# Patient Record
Sex: FEMALE | Race: WHITE | NOT HISPANIC OR LATINO | Employment: OTHER | URBAN - METROPOLITAN AREA
[De-identification: names, ages, dates, MRNs, and addresses within clinical notes are randomized per-mention and may not be internally consistent; named-entity substitution may affect disease eponyms.]

---

## 2017-01-24 ENCOUNTER — HOSPITAL ENCOUNTER (OUTPATIENT)
Dept: PALLIATIVE MEDICINE | Facility: OTHER | Age: 72
Discharge: HOME | End: 2017-01-24
Attending: PHYSICIAN ASSISTANT

## 2017-01-24 DIAGNOSIS — F11.20 OPIOID DEPENDENCE, CONTINUOUS (H): ICD-10-CM

## 2017-01-24 DIAGNOSIS — M51.369 LUMBAR DEGENERATIVE DISC DISEASE: ICD-10-CM

## 2017-01-24 DIAGNOSIS — D49.7 PITUITARY TUMOR: ICD-10-CM

## 2017-01-24 DIAGNOSIS — M54.16 LUMBAR RADICULAR PAIN: ICD-10-CM

## 2017-01-24 DIAGNOSIS — M25.50 PAIN IN JOINT, MULTIPLE SITES: ICD-10-CM

## 2017-01-24 DIAGNOSIS — G89.4 CHRONIC PAIN SYNDROME: ICD-10-CM

## 2017-01-24 DIAGNOSIS — E11.42 DIABETIC PERIPHERAL NEUROPATHY (H): ICD-10-CM

## 2017-01-24 ASSESSMENT — MIFFLIN-ST. JEOR: SCORE: 1499.93

## 2017-01-25 ENCOUNTER — AMBULATORY - HEALTHEAST (OUTPATIENT)
Dept: PALLIATIVE MEDICINE | Facility: OTHER | Age: 72
End: 2017-01-25

## 2017-01-30 ENCOUNTER — AMBULATORY - HEALTHEAST (OUTPATIENT)
Dept: NEUROSURGERY | Facility: CLINIC | Age: 72
End: 2017-01-30

## 2017-02-09 ENCOUNTER — COMMUNICATION - HEALTHEAST (OUTPATIENT)
Dept: NEUROSURGERY | Facility: CLINIC | Age: 72
End: 2017-02-09

## 2017-02-09 ENCOUNTER — AMBULATORY - HEALTHEAST (OUTPATIENT)
Dept: PALLIATIVE MEDICINE | Facility: OTHER | Age: 72
End: 2017-02-09

## 2017-02-14 ENCOUNTER — AMBULATORY - HEALTHEAST (OUTPATIENT)
Dept: NEUROSURGERY | Facility: CLINIC | Age: 72
End: 2017-02-14

## 2017-02-20 ENCOUNTER — AMBULATORY - HEALTHEAST (OUTPATIENT)
Dept: NEUROSURGERY | Facility: CLINIC | Age: 72
End: 2017-02-20

## 2017-02-20 ENCOUNTER — OFFICE VISIT - HEALTHEAST (OUTPATIENT)
Dept: NEUROSURGERY | Facility: CLINIC | Age: 72
End: 2017-02-20

## 2017-02-20 ENCOUNTER — OFFICE VISIT - HEALTHEAST (OUTPATIENT)
Dept: RADIOLOGY | Facility: CLINIC | Age: 72
End: 2017-02-20

## 2017-02-20 DIAGNOSIS — D49.7 PITUITARY TUMOR: ICD-10-CM

## 2017-02-20 ASSESSMENT — MIFFLIN-ST. JEOR: SCORE: 1499.93

## 2017-02-22 ENCOUNTER — COMMUNICATION - HEALTHEAST (OUTPATIENT)
Dept: PALLIATIVE MEDICINE | Facility: OTHER | Age: 72
End: 2017-02-22

## 2017-02-22 DIAGNOSIS — G89.4 CHRONIC PAIN SYNDROME: ICD-10-CM

## 2017-02-24 ENCOUNTER — COMMUNICATION - HEALTHEAST (OUTPATIENT)
Dept: NEUROSURGERY | Facility: CLINIC | Age: 72
End: 2017-02-24

## 2017-02-27 ENCOUNTER — AMBULATORY - HEALTHEAST (OUTPATIENT)
Dept: NEUROSURGERY | Facility: CLINIC | Age: 72
End: 2017-02-27

## 2017-02-27 ENCOUNTER — COMMUNICATION - HEALTHEAST (OUTPATIENT)
Dept: NEUROSURGERY | Facility: CLINIC | Age: 72
End: 2017-02-27

## 2017-03-02 ENCOUNTER — AMBULATORY - HEALTHEAST (OUTPATIENT)
Dept: NEUROSURGERY | Facility: CLINIC | Age: 72
End: 2017-03-02

## 2017-03-08 ENCOUNTER — AMBULATORY - HEALTHEAST (OUTPATIENT)
Dept: NEUROSURGERY | Facility: CLINIC | Age: 72
End: 2017-03-08

## 2017-03-08 DIAGNOSIS — D35.2 PITUITARY ADENOMA (H): ICD-10-CM

## 2017-03-10 ENCOUNTER — COMMUNICATION - HEALTHEAST (OUTPATIENT)
Dept: NEUROSURGERY | Facility: CLINIC | Age: 72
End: 2017-03-10

## 2017-03-13 ENCOUNTER — COMMUNICATION - HEALTHEAST (OUTPATIENT)
Dept: NEUROSURGERY | Facility: CLINIC | Age: 72
End: 2017-03-13

## 2017-03-16 ENCOUNTER — AMBULATORY - HEALTHEAST (OUTPATIENT)
Dept: NEUROSURGERY | Facility: CLINIC | Age: 72
End: 2017-03-16

## 2017-03-23 ENCOUNTER — HOSPITAL ENCOUNTER (OUTPATIENT)
Dept: PALLIATIVE MEDICINE | Facility: OTHER | Age: 72
Discharge: HOME | End: 2017-03-23
Attending: PHYSICIAN ASSISTANT

## 2017-03-23 DIAGNOSIS — G89.4 CHRONIC PAIN SYNDROME: ICD-10-CM

## 2017-03-23 DIAGNOSIS — M51.379 DEGENERATION OF LUMBAR OR LUMBOSACRAL INTERVERTEBRAL DISC: ICD-10-CM

## 2017-03-23 DIAGNOSIS — E11.42 DIABETIC PERIPHERAL NEUROPATHY (H): ICD-10-CM

## 2017-03-23 DIAGNOSIS — F11.20 OPIOID TYPE DEPENDENCE, CONTINUOUS (H): ICD-10-CM

## 2017-03-23 ASSESSMENT — MIFFLIN-ST. JEOR: SCORE: 1445.49

## 2017-04-03 ENCOUNTER — AMBULATORY - HEALTHEAST (OUTPATIENT)
Dept: NEUROSURGERY | Facility: CLINIC | Age: 72
End: 2017-04-03

## 2017-04-06 ENCOUNTER — AMBULATORY - HEALTHEAST (OUTPATIENT)
Dept: NEUROSURGERY | Facility: CLINIC | Age: 72
End: 2017-04-06

## 2017-04-07 ENCOUNTER — OFFICE VISIT - HEALTHEAST (OUTPATIENT)
Dept: NEUROSURGERY | Facility: CLINIC | Age: 72
End: 2017-04-07

## 2017-04-07 ENCOUNTER — AMBULATORY - HEALTHEAST (OUTPATIENT)
Dept: LAB | Facility: CLINIC | Age: 72
End: 2017-04-07

## 2017-04-07 DIAGNOSIS — D49.7 PITUITARY TUMOR: ICD-10-CM

## 2017-04-07 DIAGNOSIS — Z01.818 PRE-OP EVALUATION: ICD-10-CM

## 2017-04-10 ENCOUNTER — AMBULATORY - HEALTHEAST (OUTPATIENT)
Dept: NEUROSURGERY | Facility: CLINIC | Age: 72
End: 2017-04-10

## 2017-04-10 DIAGNOSIS — M25.50 PAIN IN JOINT, MULTIPLE SITES: ICD-10-CM

## 2017-04-10 DIAGNOSIS — D35.2 PITUITARY ADENOMA (H): ICD-10-CM

## 2017-04-13 ENCOUNTER — ANESTHESIA - HEALTHEAST (OUTPATIENT)
Dept: SURGERY | Facility: CLINIC | Age: 72
End: 2017-04-13

## 2017-04-14 ENCOUNTER — HOSPITAL ENCOUNTER (OUTPATIENT)
Dept: MRI IMAGING | Facility: CLINIC | Age: 72
Discharge: HOME | End: 2017-04-14
Attending: SURGERY | Admitting: SURGERY

## 2017-04-14 ENCOUNTER — HOSPITAL ENCOUNTER (OUTPATIENT)
Dept: CT IMAGING | Facility: CLINIC | Age: 72
Discharge: HOME | End: 2017-04-14
Attending: SURGERY | Admitting: SURGERY

## 2017-04-14 ENCOUNTER — SURGERY - HEALTHEAST (OUTPATIENT)
Dept: SURGERY | Facility: CLINIC | Age: 72
End: 2017-04-14

## 2017-04-14 DIAGNOSIS — D35.2 PITUITARY ADENOMA (H): ICD-10-CM

## 2017-04-14 ASSESSMENT — MIFFLIN-ST. JEOR: SCORE: 1489.72

## 2017-04-18 ENCOUNTER — COMMUNICATION - HEALTHEAST (OUTPATIENT)
Dept: NEUROSURGERY | Facility: CLINIC | Age: 72
End: 2017-04-18

## 2017-04-18 ASSESSMENT — MIFFLIN-ST. JEOR: SCORE: 1522.83

## 2017-04-24 ENCOUNTER — COMMUNICATION - HEALTHEAST (OUTPATIENT)
Dept: PALLIATIVE MEDICINE | Facility: OTHER | Age: 72
End: 2017-04-24

## 2017-04-24 DIAGNOSIS — G89.4 CHRONIC PAIN SYNDROME: ICD-10-CM

## 2017-05-05 ENCOUNTER — AMBULATORY - HEALTHEAST (OUTPATIENT)
Dept: NEUROSURGERY | Facility: CLINIC | Age: 72
End: 2017-05-05

## 2017-05-05 DIAGNOSIS — Z48.02 REMOVAL OF STAPLES: ICD-10-CM

## 2017-05-18 ENCOUNTER — AMBULATORY - HEALTHEAST (OUTPATIENT)
Dept: NEUROSURGERY | Facility: CLINIC | Age: 72
End: 2017-05-18

## 2017-05-22 ENCOUNTER — OFFICE VISIT - HEALTHEAST (OUTPATIENT)
Dept: NEUROSURGERY | Facility: CLINIC | Age: 72
End: 2017-05-22

## 2017-05-22 DIAGNOSIS — D35.2 PITUITARY ADENOMA (H): ICD-10-CM

## 2017-05-23 ENCOUNTER — HOSPITAL ENCOUNTER (OUTPATIENT)
Dept: PALLIATIVE MEDICINE | Facility: OTHER | Age: 72
Discharge: HOME | End: 2017-05-23
Attending: PHYSICIAN ASSISTANT

## 2017-05-23 DIAGNOSIS — M51.379 DEGENERATION OF LUMBAR OR LUMBOSACRAL INTERVERTEBRAL DISC: ICD-10-CM

## 2017-05-23 DIAGNOSIS — G89.4 CHRONIC PAIN SYNDROME: ICD-10-CM

## 2017-05-23 DIAGNOSIS — E11.42 DIABETIC PERIPHERAL NEUROPATHY (H): ICD-10-CM

## 2017-05-23 DIAGNOSIS — F11.20 OPIOID TYPE DEPENDENCE, CONTINUOUS (H): ICD-10-CM

## 2017-05-23 ASSESSMENT — MIFFLIN-ST. JEOR: SCORE: 1485.19

## 2017-06-20 ENCOUNTER — HOSPITAL ENCOUNTER (OUTPATIENT)
Dept: MRI IMAGING | Facility: CLINIC | Age: 72
Discharge: HOME | End: 2017-06-20

## 2017-06-20 ENCOUNTER — COMMUNICATION - HEALTHEAST (OUTPATIENT)
Dept: PALLIATIVE MEDICINE | Facility: OTHER | Age: 72
End: 2017-06-20

## 2017-06-20 DIAGNOSIS — D35.2 PITUITARY ADENOMA (H): ICD-10-CM

## 2017-06-20 DIAGNOSIS — G89.4 CHRONIC PAIN SYNDROME: ICD-10-CM

## 2017-06-20 ASSESSMENT — MIFFLIN-ST. JEOR: SCORE: 1450.32

## 2017-07-13 ENCOUNTER — HOSPITAL ENCOUNTER (OUTPATIENT)
Dept: PALLIATIVE MEDICINE | Facility: OTHER | Age: 72
Discharge: HOME | End: 2017-07-13
Attending: PHYSICIAN ASSISTANT

## 2017-07-13 DIAGNOSIS — M51.379 DEGENERATION OF LUMBAR OR LUMBOSACRAL INTERVERTEBRAL DISC: ICD-10-CM

## 2017-07-13 DIAGNOSIS — F11.20 OPIOID TYPE DEPENDENCE, CONTINUOUS (H): ICD-10-CM

## 2017-07-13 DIAGNOSIS — E11.42 DIABETIC PERIPHERAL NEUROPATHY (H): ICD-10-CM

## 2017-07-13 DIAGNOSIS — G89.4 CHRONIC PAIN SYNDROME: ICD-10-CM

## 2017-07-13 ASSESSMENT — MIFFLIN-ST. JEOR: SCORE: 1467.04

## 2017-08-16 ENCOUNTER — RECORDS - HEALTHEAST (OUTPATIENT)
Dept: ADMINISTRATIVE | Facility: OTHER | Age: 72
End: 2017-08-16

## 2017-09-05 ENCOUNTER — HOSPITAL ENCOUNTER (OUTPATIENT)
Dept: PALLIATIVE MEDICINE | Facility: OTHER | Age: 72
Discharge: HOME | End: 2017-09-05
Attending: PHYSICIAN ASSISTANT

## 2017-09-05 DIAGNOSIS — M51.379 DEGENERATION OF LUMBAR OR LUMBOSACRAL INTERVERTEBRAL DISC: ICD-10-CM

## 2017-09-05 DIAGNOSIS — G89.4 CHRONIC PAIN SYNDROME: ICD-10-CM

## 2017-09-05 DIAGNOSIS — E11.42 DIABETIC PERIPHERAL NEUROPATHY (H): ICD-10-CM

## 2017-09-05 DIAGNOSIS — F11.20 OPIOID TYPE DEPENDENCE, CONTINUOUS (H): ICD-10-CM

## 2017-09-05 ASSESSMENT — MIFFLIN-ST. JEOR: SCORE: 1467.04

## 2017-10-16 ENCOUNTER — COMMUNICATION - HEALTHEAST (OUTPATIENT)
Dept: PALLIATIVE MEDICINE | Facility: CLINIC | Age: 72
End: 2017-10-16

## 2017-10-16 DIAGNOSIS — G89.4 CHRONIC PAIN SYNDROME: ICD-10-CM

## 2017-10-19 ENCOUNTER — HOSPITAL ENCOUNTER (OUTPATIENT)
Dept: PALLIATIVE MEDICINE | Facility: OTHER | Age: 72
Discharge: HOME | End: 2017-10-19
Attending: PHYSICIAN ASSISTANT

## 2017-10-19 DIAGNOSIS — E11.42 DIABETIC PERIPHERAL NEUROPATHY (H): ICD-10-CM

## 2017-10-19 DIAGNOSIS — G89.4 CHRONIC PAIN SYNDROME: ICD-10-CM

## 2017-10-19 DIAGNOSIS — F11.20 OPIOID TYPE DEPENDENCE, CONTINUOUS (H): ICD-10-CM

## 2017-10-19 DIAGNOSIS — M51.379 DEGENERATION OF LUMBAR OR LUMBOSACRAL INTERVERTEBRAL DISC: ICD-10-CM

## 2017-10-19 ASSESSMENT — MIFFLIN-ST. JEOR: SCORE: 1467.04

## 2017-12-07 ENCOUNTER — COMMUNICATION - HEALTHEAST (OUTPATIENT)
Dept: PALLIATIVE MEDICINE | Facility: CLINIC | Age: 72
End: 2017-12-07

## 2017-12-07 DIAGNOSIS — G89.4 CHRONIC PAIN SYNDROME: ICD-10-CM

## 2017-12-21 ENCOUNTER — HOSPITAL ENCOUNTER (OUTPATIENT)
Dept: PALLIATIVE MEDICINE | Facility: OTHER | Age: 72
Discharge: HOME | End: 2017-12-21
Attending: PHYSICIAN ASSISTANT

## 2017-12-21 ENCOUNTER — COMMUNICATION - HEALTHEAST (OUTPATIENT)
Dept: PALLIATIVE MEDICINE | Facility: CLINIC | Age: 72
End: 2017-12-21

## 2017-12-21 DIAGNOSIS — F11.20 OPIOID TYPE DEPENDENCE, CONTINUOUS (H): ICD-10-CM

## 2017-12-21 DIAGNOSIS — M51.379 DEGENERATION OF LUMBAR OR LUMBOSACRAL INTERVERTEBRAL DISC: ICD-10-CM

## 2017-12-21 DIAGNOSIS — G89.4 CHRONIC PAIN SYNDROME: ICD-10-CM

## 2017-12-21 DIAGNOSIS — E11.42 DIABETIC PERIPHERAL NEUROPATHY (H): ICD-10-CM

## 2017-12-21 RX ORDER — ATORVASTATIN CALCIUM 40 MG/1
40 TABLET, FILM COATED ORAL EVERY OTHER DAY
Refills: 3 | Status: SHIPPED | COMMUNITY
Start: 2017-11-10

## 2017-12-21 ASSESSMENT — MIFFLIN-ST. JEOR: SCORE: 1467.04

## 2018-02-15 ENCOUNTER — HOSPITAL ENCOUNTER (OUTPATIENT)
Dept: PALLIATIVE MEDICINE | Facility: OTHER | Age: 73
Discharge: HOME | End: 2018-02-15
Attending: PHYSICIAN ASSISTANT

## 2018-02-15 DIAGNOSIS — F11.20 OPIOID TYPE DEPENDENCE, CONTINUOUS (H): ICD-10-CM

## 2018-02-15 DIAGNOSIS — E11.42 DIABETIC PERIPHERAL NEUROPATHY (H): ICD-10-CM

## 2018-02-15 DIAGNOSIS — M51.379 DEGENERATION OF LUMBAR OR LUMBOSACRAL INTERVERTEBRAL DISC: ICD-10-CM

## 2018-02-15 DIAGNOSIS — G89.4 CHRONIC PAIN SYNDROME: ICD-10-CM

## 2018-02-15 ASSESSMENT — MIFFLIN-ST. JEOR: SCORE: 1467.04

## 2018-04-12 ENCOUNTER — HOSPITAL ENCOUNTER (OUTPATIENT)
Dept: PALLIATIVE MEDICINE | Facility: OTHER | Age: 73
Discharge: HOME | End: 2018-04-12
Attending: PHYSICIAN ASSISTANT

## 2018-04-12 DIAGNOSIS — F11.20 OPIOID DEPENDENCE, CONTINUOUS (H): ICD-10-CM

## 2018-04-12 DIAGNOSIS — G89.4 CHRONIC PAIN SYNDROME: ICD-10-CM

## 2018-04-12 DIAGNOSIS — M51.379 DEGENERATION OF LUMBAR OR LUMBOSACRAL INTERVERTEBRAL DISC: ICD-10-CM

## 2018-04-12 DIAGNOSIS — E11.42 DIABETIC PERIPHERAL NEUROPATHY (H): ICD-10-CM

## 2018-04-12 ASSESSMENT — MIFFLIN-ST. JEOR: SCORE: 1467.04

## 2018-04-18 ENCOUNTER — COMMUNICATION - HEALTHEAST (OUTPATIENT)
Dept: PALLIATIVE MEDICINE | Facility: OTHER | Age: 73
End: 2018-04-18

## 2018-04-18 DIAGNOSIS — G89.4 CHRONIC PAIN SYNDROME: ICD-10-CM

## 2018-06-07 ENCOUNTER — HOSPITAL ENCOUNTER (OUTPATIENT)
Dept: PALLIATIVE MEDICINE | Facility: OTHER | Age: 73
Discharge: HOME | End: 2018-06-07
Attending: PHYSICIAN ASSISTANT

## 2018-06-07 DIAGNOSIS — M51.379 DEGENERATION OF LUMBAR OR LUMBOSACRAL INTERVERTEBRAL DISC: ICD-10-CM

## 2018-06-07 DIAGNOSIS — G89.4 CHRONIC PAIN SYNDROME: ICD-10-CM

## 2018-06-07 DIAGNOSIS — E11.42 DIABETIC PERIPHERAL NEUROPATHY (H): ICD-10-CM

## 2018-06-07 DIAGNOSIS — F11.20 OPIOID TYPE DEPENDENCE, CONTINUOUS (H): ICD-10-CM

## 2018-06-07 ASSESSMENT — MIFFLIN-ST. JEOR: SCORE: 1497.65

## 2018-08-20 ENCOUNTER — RECORDS - HEALTHEAST (OUTPATIENT)
Dept: ADMINISTRATIVE | Facility: OTHER | Age: 73
End: 2018-08-20

## 2018-08-22 ENCOUNTER — HOSPITAL ENCOUNTER (OUTPATIENT)
Dept: PALLIATIVE MEDICINE | Facility: OTHER | Age: 73
Discharge: HOME | End: 2018-08-22
Attending: PHYSICIAN ASSISTANT

## 2018-08-22 ENCOUNTER — RECORDS - HEALTHEAST (OUTPATIENT)
Dept: ADMINISTRATIVE | Facility: OTHER | Age: 73
End: 2018-08-22

## 2018-08-22 DIAGNOSIS — E11.42 DIABETIC PERIPHERAL NEUROPATHY (H): ICD-10-CM

## 2018-08-22 DIAGNOSIS — G89.4 CHRONIC PAIN SYNDROME: ICD-10-CM

## 2018-08-22 DIAGNOSIS — M51.379 DEGENERATION OF LUMBAR OR LUMBOSACRAL INTERVERTEBRAL DISC: ICD-10-CM

## 2018-08-22 DIAGNOSIS — F11.90 CHRONIC, CONTINUOUS USE OF OPIOIDS: ICD-10-CM

## 2018-08-22 ASSESSMENT — MIFFLIN-ST. JEOR: SCORE: 1462.51

## 2018-10-22 ENCOUNTER — RECORDS - HEALTHEAST (OUTPATIENT)
Dept: ADMINISTRATIVE | Facility: OTHER | Age: 73
End: 2018-10-22

## 2018-10-22 ENCOUNTER — COMMUNICATION - HEALTHEAST (OUTPATIENT)
Dept: NEUROSURGERY | Facility: CLINIC | Age: 73
End: 2018-10-22

## 2018-10-24 ENCOUNTER — AMBULATORY - HEALTHEAST (OUTPATIENT)
Dept: NEUROSURGERY | Facility: CLINIC | Age: 73
End: 2018-10-24

## 2018-10-24 DIAGNOSIS — D35.2 PITUITARY ADENOMA (H): ICD-10-CM

## 2018-10-30 ENCOUNTER — HOSPITAL ENCOUNTER (OUTPATIENT)
Dept: PALLIATIVE MEDICINE | Facility: OTHER | Age: 73
Discharge: HOME | End: 2018-10-30
Attending: PHYSICIAN ASSISTANT

## 2018-10-30 DIAGNOSIS — M51.379 DEGENERATION OF LUMBAR OR LUMBOSACRAL INTERVERTEBRAL DISC: ICD-10-CM

## 2018-10-30 DIAGNOSIS — E11.42 DIABETIC PERIPHERAL NEUROPATHY (H): ICD-10-CM

## 2018-10-30 DIAGNOSIS — G89.4 CHRONIC PAIN SYNDROME: ICD-10-CM

## 2018-10-30 DIAGNOSIS — F11.90 CHRONIC, CONTINUOUS USE OF OPIOIDS: ICD-10-CM

## 2018-10-30 ASSESSMENT — MIFFLIN-ST. JEOR: SCORE: 1462.51

## 2018-11-19 ENCOUNTER — COMMUNICATION - HEALTHEAST (OUTPATIENT)
Dept: PALLIATIVE MEDICINE | Facility: OTHER | Age: 73
End: 2018-11-19

## 2018-11-20 ENCOUNTER — HOSPITAL ENCOUNTER (OUTPATIENT)
Dept: PALLIATIVE MEDICINE | Facility: OTHER | Age: 73
Discharge: HOME | End: 2018-11-20
Attending: PAIN MEDICINE | Admitting: PAIN MEDICINE

## 2018-11-20 DIAGNOSIS — M79.18 MYOFASCIAL PAIN: ICD-10-CM

## 2018-11-20 DIAGNOSIS — M25.552 HIP PAIN, LEFT: ICD-10-CM

## 2018-11-20 ASSESSMENT — MIFFLIN-ST. JEOR: SCORE: 1448.9

## 2018-11-21 ENCOUNTER — COMMUNICATION - HEALTHEAST (OUTPATIENT)
Dept: PALLIATIVE MEDICINE | Facility: OTHER | Age: 73
End: 2018-11-21

## 2018-12-04 ENCOUNTER — COMMUNICATION - HEALTHEAST (OUTPATIENT)
Dept: PALLIATIVE MEDICINE | Facility: OTHER | Age: 73
End: 2018-12-04

## 2018-12-12 ENCOUNTER — COMMUNICATION - HEALTHEAST (OUTPATIENT)
Dept: PALLIATIVE MEDICINE | Facility: OTHER | Age: 73
End: 2018-12-12

## 2018-12-27 ENCOUNTER — COMMUNICATION - HEALTHEAST (OUTPATIENT)
Dept: PALLIATIVE MEDICINE | Facility: OTHER | Age: 73
End: 2018-12-27

## 2018-12-27 ENCOUNTER — HOSPITAL ENCOUNTER (OUTPATIENT)
Dept: PALLIATIVE MEDICINE | Facility: OTHER | Age: 73
Discharge: HOME | End: 2018-12-27
Attending: PHYSICIAN ASSISTANT

## 2018-12-27 DIAGNOSIS — M51.379 DEGENERATION OF LUMBAR OR LUMBOSACRAL INTERVERTEBRAL DISC: ICD-10-CM

## 2018-12-27 DIAGNOSIS — F11.20 OPIOID TYPE DEPENDENCE, CONTINUOUS (H): ICD-10-CM

## 2018-12-27 DIAGNOSIS — G89.4 CHRONIC PAIN SYNDROME: ICD-10-CM

## 2018-12-27 DIAGNOSIS — M70.62 TROCHANTERIC BURSITIS OF LEFT HIP: ICD-10-CM

## 2018-12-27 ASSESSMENT — MIFFLIN-ST. JEOR: SCORE: 1448.9

## 2018-12-29 LAB — ARUP MISCELLANEOUS TEST: NORMAL

## 2018-12-30 LAB
MISCELLANEOUS TEST DEPT. - HE HISTORICAL: NORMAL
PERFORMING LAB: NORMAL
SPECIMEN STATUS: NORMAL
TEST NAME: NORMAL

## 2019-01-15 ENCOUNTER — COMMUNICATION - HEALTHEAST (OUTPATIENT)
Dept: PALLIATIVE MEDICINE | Facility: OTHER | Age: 74
End: 2019-01-15

## 2019-01-16 ENCOUNTER — HOSPITAL ENCOUNTER (OUTPATIENT)
Dept: PALLIATIVE MEDICINE | Facility: OTHER | Age: 74
Discharge: HOME | End: 2019-01-16
Attending: PAIN MEDICINE | Admitting: PAIN MEDICINE

## 2019-01-16 DIAGNOSIS — M79.18 MYOFASCIAL PAIN: ICD-10-CM

## 2019-01-16 ASSESSMENT — MIFFLIN-ST. JEOR: SCORE: 1448.9

## 2019-01-17 ENCOUNTER — COMMUNICATION - HEALTHEAST (OUTPATIENT)
Dept: PALLIATIVE MEDICINE | Facility: OTHER | Age: 74
End: 2019-01-17

## 2019-02-22 ENCOUNTER — HOSPITAL ENCOUNTER (OUTPATIENT)
Dept: PALLIATIVE MEDICINE | Facility: OTHER | Age: 74
Discharge: HOME | End: 2019-02-22
Attending: PHYSICIAN ASSISTANT

## 2019-02-22 DIAGNOSIS — M51.379 DEGENERATION OF LUMBAR OR LUMBOSACRAL INTERVERTEBRAL DISC: ICD-10-CM

## 2019-02-22 DIAGNOSIS — F11.20 OPIOID TYPE DEPENDENCE, CONTINUOUS (H): ICD-10-CM

## 2019-02-22 DIAGNOSIS — M54.2 NECK PAIN: ICD-10-CM

## 2019-02-22 DIAGNOSIS — G89.4 CHRONIC PAIN SYNDROME: ICD-10-CM

## 2019-02-22 DIAGNOSIS — M25.552 HIP PAIN, LEFT: ICD-10-CM

## 2019-02-22 DIAGNOSIS — E11.42 DIABETIC PERIPHERAL NEUROPATHY (H): ICD-10-CM

## 2019-02-22 ASSESSMENT — MIFFLIN-ST. JEOR: SCORE: 1485.19

## 2019-02-26 ENCOUNTER — COMMUNICATION - HEALTHEAST (OUTPATIENT)
Dept: PALLIATIVE MEDICINE | Facility: OTHER | Age: 74
End: 2019-02-26

## 2019-02-26 DIAGNOSIS — G89.4 CHRONIC PAIN SYNDROME: ICD-10-CM

## 2019-03-06 ENCOUNTER — COMMUNICATION - HEALTHEAST (OUTPATIENT)
Dept: PALLIATIVE MEDICINE | Facility: OTHER | Age: 74
End: 2019-03-06

## 2019-04-01 ENCOUNTER — COMMUNICATION - HEALTHEAST (OUTPATIENT)
Dept: PALLIATIVE MEDICINE | Facility: OTHER | Age: 74
End: 2019-04-01

## 2019-04-01 DIAGNOSIS — F11.90 CHRONIC, CONTINUOUS USE OF OPIOIDS: ICD-10-CM

## 2019-04-19 ENCOUNTER — HOSPITAL ENCOUNTER (OUTPATIENT)
Dept: PALLIATIVE MEDICINE | Facility: OTHER | Age: 74
Discharge: HOME | End: 2019-04-19
Attending: PHYSICIAN ASSISTANT

## 2019-04-19 DIAGNOSIS — G89.4 CHRONIC PAIN SYNDROME: ICD-10-CM

## 2019-04-19 DIAGNOSIS — E11.42 DIABETIC PERIPHERAL NEUROPATHY (H): ICD-10-CM

## 2019-04-19 DIAGNOSIS — D35.2 PITUITARY MACROADENOMA (H): ICD-10-CM

## 2019-04-19 DIAGNOSIS — F11.20 OPIOID TYPE DEPENDENCE, CONTINUOUS (H): ICD-10-CM

## 2019-04-19 ASSESSMENT — MIFFLIN-ST. JEOR: SCORE: 1485.19

## 2019-04-22 ENCOUNTER — COMMUNICATION - HEALTHEAST (OUTPATIENT)
Dept: PALLIATIVE MEDICINE | Facility: OTHER | Age: 74
End: 2019-04-22

## 2019-05-26 ENCOUNTER — COMMUNICATION - HEALTHEAST (OUTPATIENT)
Dept: PALLIATIVE MEDICINE | Facility: OTHER | Age: 74
End: 2019-05-26

## 2019-05-26 DIAGNOSIS — G89.4 CHRONIC PAIN SYNDROME: ICD-10-CM

## 2019-06-11 ENCOUNTER — HOSPITAL ENCOUNTER (OUTPATIENT)
Dept: PALLIATIVE MEDICINE | Facility: OTHER | Age: 74
Discharge: HOME | End: 2019-06-11
Attending: PHYSICIAN ASSISTANT

## 2019-06-11 DIAGNOSIS — F11.90 CHRONIC, CONTINUOUS USE OF OPIOIDS: ICD-10-CM

## 2019-06-11 DIAGNOSIS — M51.379 DEGENERATION OF LUMBAR OR LUMBOSACRAL INTERVERTEBRAL DISC: ICD-10-CM

## 2019-06-11 DIAGNOSIS — E11.42 DIABETIC PERIPHERAL NEUROPATHY (H): ICD-10-CM

## 2019-06-11 DIAGNOSIS — G89.4 CHRONIC PAIN SYNDROME: ICD-10-CM

## 2019-06-11 ASSESSMENT — MIFFLIN-ST. JEOR: SCORE: 1485.19

## 2019-06-18 ENCOUNTER — COMMUNICATION - HEALTHEAST (OUTPATIENT)
Dept: PALLIATIVE MEDICINE | Facility: OTHER | Age: 74
End: 2019-06-18

## 2019-07-09 ENCOUNTER — COMMUNICATION - HEALTHEAST (OUTPATIENT)
Dept: PALLIATIVE MEDICINE | Facility: OTHER | Age: 74
End: 2019-07-09

## 2019-07-09 DIAGNOSIS — G89.4 CHRONIC PAIN SYNDROME: ICD-10-CM

## 2019-08-02 ENCOUNTER — AMBULATORY - HEALTHEAST (OUTPATIENT)
Dept: PALLIATIVE MEDICINE | Facility: OTHER | Age: 74
End: 2019-08-02

## 2019-08-06 ENCOUNTER — HOSPITAL ENCOUNTER (OUTPATIENT)
Dept: PALLIATIVE MEDICINE | Facility: OTHER | Age: 74
Discharge: HOME | End: 2019-08-06
Attending: PHYSICIAN ASSISTANT

## 2019-08-06 DIAGNOSIS — E11.42 DIABETIC PERIPHERAL NEUROPATHY (H): ICD-10-CM

## 2019-08-06 DIAGNOSIS — M51.369 DEGENERATION OF LUMBAR INTERVERTEBRAL DISC: ICD-10-CM

## 2019-08-06 DIAGNOSIS — G89.4 CHRONIC PAIN SYNDROME: ICD-10-CM

## 2019-08-06 DIAGNOSIS — F11.90 CHRONIC, CONTINUOUS USE OF OPIOIDS: ICD-10-CM

## 2019-08-06 DIAGNOSIS — D35.2 PITUITARY ADENOMA (H): ICD-10-CM

## 2019-08-06 ASSESSMENT — MIFFLIN-ST. JEOR: SCORE: 1489.72

## 2019-09-17 ENCOUNTER — HOSPITAL ENCOUNTER (OUTPATIENT)
Dept: PALLIATIVE MEDICINE | Facility: OTHER | Age: 74
Discharge: HOME | End: 2019-09-17
Attending: PHYSICIAN ASSISTANT

## 2019-09-17 DIAGNOSIS — G89.4 CHRONIC PAIN SYNDROME: ICD-10-CM

## 2019-09-17 DIAGNOSIS — M50.30 DEGENERATION OF CERVICAL INTERVERTEBRAL DISC: ICD-10-CM

## 2019-09-17 DIAGNOSIS — E11.42 DIABETIC PERIPHERAL NEUROPATHY (H): ICD-10-CM

## 2019-09-17 DIAGNOSIS — M48.02 CERVICAL STENOSIS OF SPINAL CANAL: ICD-10-CM

## 2019-09-17 DIAGNOSIS — F11.90 CHRONIC, CONTINUOUS USE OF OPIOIDS: ICD-10-CM

## 2019-09-17 ASSESSMENT — MIFFLIN-ST. JEOR: SCORE: 1489.72

## 2019-10-02 ENCOUNTER — OFFICE VISIT - HEALTHEAST (OUTPATIENT)
Dept: PHYSICAL THERAPY | Facility: REHABILITATION | Age: 74
End: 2019-10-02

## 2019-10-02 DIAGNOSIS — M62.81 GENERALIZED MUSCLE WEAKNESS: ICD-10-CM

## 2019-10-02 DIAGNOSIS — R29.898 DECREASED RANGE OF MOTION OF NECK: ICD-10-CM

## 2019-10-02 DIAGNOSIS — G89.29 CHRONIC NECK PAIN: ICD-10-CM

## 2019-10-02 DIAGNOSIS — M54.2 CHRONIC NECK PAIN: ICD-10-CM

## 2019-10-03 ENCOUNTER — RECORDS - HEALTHEAST (OUTPATIENT)
Dept: ADMINISTRATIVE | Facility: OTHER | Age: 74
End: 2019-10-03

## 2019-10-16 ENCOUNTER — OFFICE VISIT - HEALTHEAST (OUTPATIENT)
Dept: PHYSICAL THERAPY | Facility: REHABILITATION | Age: 74
End: 2019-10-16

## 2019-10-16 DIAGNOSIS — M62.81 GENERALIZED MUSCLE WEAKNESS: ICD-10-CM

## 2019-10-16 DIAGNOSIS — G89.29 CHRONIC NECK PAIN: ICD-10-CM

## 2019-10-16 DIAGNOSIS — R29.898 DECREASED RANGE OF MOTION OF NECK: ICD-10-CM

## 2019-10-16 DIAGNOSIS — M54.2 CHRONIC NECK PAIN: ICD-10-CM

## 2019-10-23 ENCOUNTER — COMMUNICATION - HEALTHEAST (OUTPATIENT)
Dept: PALLIATIVE MEDICINE | Facility: OTHER | Age: 74
End: 2019-10-23

## 2019-10-23 DIAGNOSIS — G89.4 CHRONIC PAIN SYNDROME: ICD-10-CM

## 2019-10-30 ENCOUNTER — OFFICE VISIT - HEALTHEAST (OUTPATIENT)
Dept: PHYSICAL THERAPY | Facility: REHABILITATION | Age: 74
End: 2019-10-30

## 2019-10-30 DIAGNOSIS — M62.81 GENERALIZED MUSCLE WEAKNESS: ICD-10-CM

## 2019-10-30 DIAGNOSIS — G89.29 CHRONIC NECK PAIN: ICD-10-CM

## 2019-10-30 DIAGNOSIS — M54.2 CHRONIC NECK PAIN: ICD-10-CM

## 2019-10-30 DIAGNOSIS — R29.898 DECREASED RANGE OF MOTION OF NECK: ICD-10-CM

## 2019-11-12 ENCOUNTER — HOSPITAL ENCOUNTER (OUTPATIENT)
Dept: PALLIATIVE MEDICINE | Facility: OTHER | Age: 74
Discharge: HOME | End: 2019-11-12
Attending: PHYSICIAN ASSISTANT

## 2019-11-12 DIAGNOSIS — M48.02 CERVICAL STENOSIS OF SPINAL CANAL: ICD-10-CM

## 2019-11-12 DIAGNOSIS — M51.379 DEGENERATION OF LUMBAR OR LUMBOSACRAL INTERVERTEBRAL DISC: ICD-10-CM

## 2019-11-12 DIAGNOSIS — G89.4 CHRONIC PAIN SYNDROME: ICD-10-CM

## 2019-11-12 DIAGNOSIS — F11.90 CHRONIC, CONTINUOUS USE OF OPIOIDS: ICD-10-CM

## 2019-11-12 RX ORDER — ONDANSETRON 4 MG/1
4 TABLET, FILM COATED ORAL EVERY 8 HOURS PRN
Status: SHIPPED | COMMUNITY
Start: 2019-11-12

## 2019-11-12 ASSESSMENT — MIFFLIN-ST. JEOR: SCORE: 1489.72

## 2019-11-13 ENCOUNTER — OFFICE VISIT - HEALTHEAST (OUTPATIENT)
Dept: PHYSICAL THERAPY | Facility: REHABILITATION | Age: 74
End: 2019-11-13

## 2019-11-13 DIAGNOSIS — G89.29 CHRONIC NECK PAIN: ICD-10-CM

## 2019-11-13 DIAGNOSIS — M54.2 CHRONIC NECK PAIN: ICD-10-CM

## 2019-11-13 DIAGNOSIS — R29.898 DECREASED RANGE OF MOTION OF NECK: ICD-10-CM

## 2019-11-13 DIAGNOSIS — M62.81 GENERALIZED MUSCLE WEAKNESS: ICD-10-CM

## 2019-12-07 ENCOUNTER — COMMUNICATION - HEALTHEAST (OUTPATIENT)
Dept: PALLIATIVE MEDICINE | Facility: OTHER | Age: 74
End: 2019-12-07

## 2019-12-07 DIAGNOSIS — G89.4 CHRONIC PAIN SYNDROME: ICD-10-CM

## 2020-01-09 ENCOUNTER — HOSPITAL ENCOUNTER (OUTPATIENT)
Dept: PALLIATIVE MEDICINE | Facility: OTHER | Age: 75
Discharge: HOME | End: 2020-01-09
Attending: PHYSICIAN ASSISTANT

## 2020-01-09 DIAGNOSIS — F11.90 CHRONIC, CONTINUOUS USE OF OPIOIDS: ICD-10-CM

## 2020-01-09 DIAGNOSIS — G89.4 CHRONIC PAIN SYNDROME: ICD-10-CM

## 2020-01-09 DIAGNOSIS — M51.369 DEGENERATION OF LUMBAR INTERVERTEBRAL DISC: ICD-10-CM

## 2020-01-09 DIAGNOSIS — E11.42 DIABETIC PERIPHERAL NEUROPATHY (H): ICD-10-CM

## 2020-01-09 DIAGNOSIS — M48.02 CERVICAL STENOSIS OF SPINAL CANAL: ICD-10-CM

## 2020-01-09 ASSESSMENT — MIFFLIN-ST. JEOR: SCORE: 1453.43

## 2020-01-12 LAB
6MAM UR QL: NOT DETECTED
7AMINOCLONAZEPAM UR QL: NOT DETECTED
A-OH ALPRAZ UR QL: NOT DETECTED
ALPRAZ UR QL: NOT DETECTED
AMPHET UR QL SCN: NOT DETECTED
BARBITURATES UR QL: NOT DETECTED
BUPRENORPHINE UR QL: NOT DETECTED
BZE UR QL: NOT DETECTED
CARBOXYTHC UR QL: NOT DETECTED
CARISOPRODOL UR QL: NOT DETECTED
CLONAZEPAM UR QL: NOT DETECTED
CODEINE UR QL: NOT DETECTED
CREAT UR-MCNC: 33.2 MG/DL (ref 20–400)
DIAZEPAM UR QL: NOT DETECTED
ETHYL GLUCURONIDE UR QL: NOT DETECTED
FENTANYL UR QL: NOT DETECTED
HYDROCODONE UR QL: NOT DETECTED
HYDROMORPHONE UR QL: NOT DETECTED
LORAZEPAM UR QL: NOT DETECTED
MDA UR QL: NOT DETECTED
MDEA UR QL: NOT DETECTED
MDMA UR QL: NOT DETECTED
ME-PHENIDATE UR QL: NOT DETECTED
MEPERIDINE UR QL: NOT DETECTED
METHADONE UR QL: PRESENT
METHAMPHET UR QL: NOT DETECTED
MIDAZOLAM UR QL SCN: NOT DETECTED
MORPHINE UR QL: NOT DETECTED
NORBUPRENORPHINE UR QL CFM: NOT DETECTED
NORDIAZEPAM UR QL: NOT DETECTED
NORFENTANYL UR QL: NOT DETECTED
NORHYDROCODONE UR QL CFM: NOT DETECTED
NOROXYCODONE UR QL CFM: NOT DETECTED
NOROXYMORPHONE UR QL SCN: NOT DETECTED
OXAZEPAM UR QL: NOT DETECTED
OXYCODONE UR QL: NOT DETECTED
OXYMORPHONE UR QL: NOT DETECTED
PATHOLOGY STUDY: NORMAL
PCP UR QL: NOT DETECTED
PHENTERMINE UR QL: NOT DETECTED
PROPOXYPH UR QL: NOT DETECTED
SERVICE CMNT-IMP: NORMAL
TAPENTADOL UR QL SCN: NOT DETECTED
TAPENTADOL UR QL SCN: NOT DETECTED
TEMAZEPAM UR QL: NOT DETECTED
TRAMADOL UR QL: NOT DETECTED
ZOLPIDEM UR QL: NOT DETECTED

## 2020-01-15 ENCOUNTER — COMMUNICATION - HEALTHEAST (OUTPATIENT)
Dept: PALLIATIVE MEDICINE | Facility: OTHER | Age: 75
End: 2020-01-15

## 2020-01-17 ENCOUNTER — HOSPITAL ENCOUNTER (OUTPATIENT)
Dept: PALLIATIVE MEDICINE | Facility: OTHER | Age: 75
Discharge: HOME | End: 2020-01-17

## 2020-02-03 ENCOUNTER — COMMUNICATION - HEALTHEAST (OUTPATIENT)
Dept: PALLIATIVE MEDICINE | Facility: OTHER | Age: 75
End: 2020-02-03

## 2020-02-03 DIAGNOSIS — M48.02 CERVICAL STENOSIS OF SPINAL CANAL: ICD-10-CM

## 2020-03-10 ENCOUNTER — COMMUNICATION - HEALTHEAST (OUTPATIENT)
Dept: PALLIATIVE MEDICINE | Facility: OTHER | Age: 75
End: 2020-03-10

## 2020-03-10 ENCOUNTER — HOSPITAL ENCOUNTER (OUTPATIENT)
Dept: PALLIATIVE MEDICINE | Facility: OTHER | Age: 75
Discharge: HOME | End: 2020-03-10
Attending: PHYSICIAN ASSISTANT

## 2020-03-10 DIAGNOSIS — M48.02 CERVICAL STENOSIS OF SPINAL CANAL: ICD-10-CM

## 2020-03-10 DIAGNOSIS — G89.4 CHRONIC PAIN SYNDROME: ICD-10-CM

## 2020-03-10 DIAGNOSIS — M51.379 DEGENERATION OF LUMBAR OR LUMBOSACRAL INTERVERTEBRAL DISC: ICD-10-CM

## 2020-03-10 DIAGNOSIS — E11.42 DIABETIC PERIPHERAL NEUROPATHY (H): ICD-10-CM

## 2020-03-10 DIAGNOSIS — F11.90 CHRONIC, CONTINUOUS USE OF OPIOIDS: ICD-10-CM

## 2020-03-10 RX ORDER — IBUPROFEN 800 MG/1
800 TABLET, FILM COATED ORAL EVERY 8 HOURS PRN
Qty: 45 TABLET | Refills: 1 | Status: SHIPPED | OUTPATIENT
Start: 2020-03-10 | End: 2021-08-18

## 2020-03-10 ASSESSMENT — MIFFLIN-ST. JEOR: SCORE: 1453.43

## 2020-04-30 ENCOUNTER — HOSPITAL ENCOUNTER (OUTPATIENT)
Dept: PALLIATIVE MEDICINE | Facility: OTHER | Age: 75
Discharge: HOME | End: 2020-04-30
Attending: PHYSICIAN ASSISTANT

## 2020-04-30 DIAGNOSIS — F11.90 CHRONIC, CONTINUOUS USE OF OPIOIDS: ICD-10-CM

## 2020-04-30 DIAGNOSIS — M51.379 DEGENERATION OF LUMBAR OR LUMBOSACRAL INTERVERTEBRAL DISC: ICD-10-CM

## 2020-04-30 DIAGNOSIS — E11.42 DIABETIC PERIPHERAL NEUROPATHY (H): ICD-10-CM

## 2020-04-30 DIAGNOSIS — G89.4 CHRONIC PAIN SYNDROME: ICD-10-CM

## 2020-06-25 ENCOUNTER — HOSPITAL ENCOUNTER (OUTPATIENT)
Dept: PALLIATIVE MEDICINE | Facility: OTHER | Age: 75
Discharge: HOME | End: 2020-06-25
Attending: PHYSICIAN ASSISTANT

## 2020-06-25 DIAGNOSIS — M54.16 LUMBAR RADICULAR PAIN: ICD-10-CM

## 2020-06-25 DIAGNOSIS — G89.4 CHRONIC PAIN SYNDROME: ICD-10-CM

## 2020-06-25 DIAGNOSIS — M51.379 DEGENERATION OF LUMBAR OR LUMBOSACRAL INTERVERTEBRAL DISC: ICD-10-CM

## 2020-06-25 DIAGNOSIS — E11.42 DIABETIC PERIPHERAL NEUROPATHY (H): ICD-10-CM

## 2020-06-25 DIAGNOSIS — F11.90 CHRONIC, CONTINUOUS USE OF OPIOIDS: ICD-10-CM

## 2020-06-25 RX ORDER — INSULIN GLARGINE 100 [IU]/ML
40 INJECTION, SOLUTION SUBCUTANEOUS AT BEDTIME
Status: SHIPPED | COMMUNITY
Start: 2020-05-06 | End: 2021-10-14

## 2020-06-25 ASSESSMENT — MIFFLIN-ST. JEOR: SCORE: 1485.19

## 2020-07-15 ENCOUNTER — HOSPITAL ENCOUNTER (OUTPATIENT)
Dept: PALLIATIVE MEDICINE | Facility: OTHER | Age: 75
Discharge: HOME | End: 2020-07-15
Attending: PHYSICIAN ASSISTANT

## 2020-07-15 DIAGNOSIS — G89.4 CHRONIC PAIN SYNDROME: ICD-10-CM

## 2020-09-16 ENCOUNTER — HOSPITAL ENCOUNTER (OUTPATIENT)
Dept: PALLIATIVE MEDICINE | Facility: OTHER | Age: 75
Discharge: HOME | End: 2020-09-16
Attending: PHYSICIAN ASSISTANT

## 2020-09-16 DIAGNOSIS — F11.90 CHRONIC, CONTINUOUS USE OF OPIOIDS: ICD-10-CM

## 2020-09-16 DIAGNOSIS — E11.42 DIABETIC PERIPHERAL NEUROPATHY (H): ICD-10-CM

## 2020-09-16 DIAGNOSIS — G89.4 CHRONIC PAIN SYNDROME: ICD-10-CM

## 2020-09-16 DIAGNOSIS — M51.379 DEGENERATION OF LUMBAR OR LUMBOSACRAL INTERVERTEBRAL DISC: ICD-10-CM

## 2020-09-16 ASSESSMENT — MIFFLIN-ST. JEOR: SCORE: 1476.11

## 2020-09-30 ENCOUNTER — RECORDS - HEALTHEAST (OUTPATIENT)
Dept: ADMINISTRATIVE | Facility: OTHER | Age: 75
End: 2020-09-30

## 2020-11-11 ENCOUNTER — HOSPITAL ENCOUNTER (OUTPATIENT)
Dept: PALLIATIVE MEDICINE | Facility: OTHER | Age: 75
Discharge: HOME | End: 2020-11-11
Attending: PHYSICIAN ASSISTANT

## 2020-11-11 DIAGNOSIS — G89.4 CHRONIC PAIN SYNDROME: ICD-10-CM

## 2020-11-18 ENCOUNTER — COMMUNICATION - HEALTHEAST (OUTPATIENT)
Dept: SCHEDULING | Facility: CLINIC | Age: 75
End: 2020-11-18

## 2021-01-05 ENCOUNTER — COMMUNICATION - HEALTHEAST (OUTPATIENT)
Dept: PALLIATIVE MEDICINE | Facility: OTHER | Age: 76
End: 2021-01-05

## 2021-01-05 DIAGNOSIS — G89.4 CHRONIC PAIN SYNDROME: ICD-10-CM

## 2021-01-17 ENCOUNTER — COMMUNICATION - HEALTHEAST (OUTPATIENT)
Dept: PALLIATIVE MEDICINE | Facility: OTHER | Age: 76
End: 2021-01-17

## 2021-01-17 DIAGNOSIS — G89.4 CHRONIC PAIN SYNDROME: ICD-10-CM

## 2021-01-21 ENCOUNTER — HOSPITAL ENCOUNTER (OUTPATIENT)
Dept: PALLIATIVE MEDICINE | Facility: OTHER | Age: 76
Discharge: HOME | End: 2021-01-21
Attending: PHYSICIAN ASSISTANT

## 2021-01-21 DIAGNOSIS — G89.4 CHRONIC PAIN SYNDROME: ICD-10-CM

## 2021-01-21 RX ORDER — INSULIN GLARGINE AND LIXISENATIDE 100; 33 U/ML; UG/ML
INJECTION, SOLUTION SUBCUTANEOUS
Status: SHIPPED | COMMUNITY
Start: 2020-12-22 | End: 2021-10-14

## 2021-01-21 ASSESSMENT — MIFFLIN-ST. JEOR: SCORE: 1485.19

## 2021-01-22 ENCOUNTER — COMMUNICATION - HEALTHEAST (OUTPATIENT)
Dept: PALLIATIVE MEDICINE | Facility: OTHER | Age: 76
End: 2021-01-22

## 2021-01-24 LAB
6MAM UR QL: NOT DETECTED
7AMINOCLONAZEPAM UR QL: NOT DETECTED
A-OH ALPRAZ UR QL: NOT DETECTED
ALPRAZ UR QL: NOT DETECTED
AMPHET UR QL SCN: NOT DETECTED
BARBITURATES UR QL: NOT DETECTED
BUPRENORPHINE UR QL: NOT DETECTED
BZE UR QL: NOT DETECTED
CARBOXYTHC UR QL: NOT DETECTED
CARISOPRODOL UR QL: NOT DETECTED
CLONAZEPAM UR QL: NOT DETECTED
CODEINE UR QL: NOT DETECTED
CREAT UR-MCNC: 40 MG/DL (ref 20–400)
DIAZEPAM UR QL: NOT DETECTED
ETHYL GLUCURONIDE UR QL: NOT DETECTED
FENTANYL UR QL: NOT DETECTED
HYDROCODONE UR QL: NOT DETECTED
HYDROMORPHONE UR QL: NOT DETECTED
LORAZEPAM UR QL: NOT DETECTED
MDA UR QL: NOT DETECTED
MDEA UR QL: NOT DETECTED
MDMA UR QL: NOT DETECTED
ME-PHENIDATE UR QL: NOT DETECTED
MEPERIDINE UR QL: NOT DETECTED
METHADONE UR QL: PRESENT
METHAMPHET UR QL: NOT DETECTED
MIDAZOLAM UR QL SCN: NOT DETECTED
MORPHINE UR QL: NOT DETECTED
NORBUPRENORPHINE UR QL CFM: NOT DETECTED
NORDIAZEPAM UR QL: NOT DETECTED
NORFENTANYL UR QL: NOT DETECTED
NORHYDROCODONE UR QL CFM: NOT DETECTED
NOROXYCODONE UR QL CFM: PRESENT
NOROXYMORPHONE UR QL SCN: NOT DETECTED
OXAZEPAM UR QL: NOT DETECTED
OXYCODONE UR QL: PRESENT
OXYMORPHONE UR QL: NOT DETECTED
PATHOLOGY STUDY: NORMAL
PCP UR QL: NOT DETECTED
PHENTERMINE UR QL: NOT DETECTED
PROPOXYPH UR QL: NOT DETECTED
SERVICE CMNT-IMP: NORMAL
TAPENTADOL UR QL SCN: NOT DETECTED
TAPENTADOL UR QL SCN: NOT DETECTED
TEMAZEPAM UR QL: NOT DETECTED
TRAMADOL UR QL: NOT DETECTED
ZOLPIDEM UR QL: NOT DETECTED

## 2021-03-17 ENCOUNTER — OFFICE VISIT (OUTPATIENT)
Dept: NEUROLOGY | Facility: CLINIC | Age: 76
End: 2021-03-17
Payer: COMMERCIAL

## 2021-03-17 DIAGNOSIS — R20.0 NUMBNESS AND TINGLING IN LEFT HAND: Primary | ICD-10-CM

## 2021-03-17 DIAGNOSIS — R20.2 NUMBNESS AND TINGLING IN LEFT HAND: Primary | ICD-10-CM

## 2021-03-17 PROCEDURE — 99207 PR NO CHARGE NURSE ONLY: CPT | Performed by: PSYCHIATRY & NEUROLOGY

## 2021-03-17 PROCEDURE — 95910 NRV CNDJ TEST 7-8 STUDIES: CPT | Performed by: PSYCHIATRY & NEUROLOGY

## 2021-03-17 PROCEDURE — 95886 MUSC TEST DONE W/N TEST COMP: CPT | Performed by: PSYCHIATRY & NEUROLOGY

## 2021-03-17 NOTE — PROCEDURES
[facility]    Formerly [facility]  [address], [address]  [place] [address]  Tel: [phone]  Fax: [phone]          Full Name: Citlalli Noonan Gender: Female  Patient ID: [id number] YOB: 1945      Visit Date: 3/17/2021 12:32  Age: 75 Years 6 Months Old  Interpreted By: Augie Kwong MD   Ref Dr.: Angeles CHAPA  Tech: ST   Height: 5 feet 5 inch  Reason for referral: Evaluate left upper. c/o numbness, tingling in left hand/fingesr, mainly in digits I-III > 2 years. Diabetic > 25 years. h/o both CTR.       Motor NCS      Nerve / Sites Lat Amp Dist Malcolm    ms mV cm m/s   L Median - APB      Wrist 3.85 7.0 7       Elbow 8.13 6.3 25 59   L Ulnar - ADM      Wrist 2.66 11.5 7       B.Elbow 5.89 11.0 20 62      A.Elbow 8.07 10.5 10 46       F  Wave      Nerve Fmin    ms   L Ulnar - ADM 28.13       Sensory NCS      Nerve / Sites Pk Lat NP Amp Dist    ms  V cm   L Median - II (Antidr)      Wrist 2.97 19.2 13   L Ulnar - V (Antidr)      Wrist 2.76 20.9 11   L Median, Ulnar - Transcarpal comparison      Median Palm 2.29 42.1 8      Ulnar Palm 2.03 23.8 8       EMG Summary Table     Spontaneous MUAP Rcmt Note   Muscle Fib PSW Fasc IA # Amp Dur PPP Rate Type   L. Brachioradialis None None None N N N N N N N   L. Pronator teres None None None N N N N N N N   L. Biceps brachii None None None N N N N N N N   L. Deltoid None None None N N N N N N N   L. Triceps brachii None None None N N N N N N N   L. Flexor carpi ulnaris None None None N N N N N N N   L. Abductor digiti minimi (manus) None None None N Sl Red Incr Incr N N N   L. First dorsal interosseous None None None N Sl Red Incr Incr N N N   L. Abductor pollicis brevis None None None N N N N N N N       EMG of the left upper extremity  Patient with numbness in the left hand and fingers  Patient diabetic for greater than 25 years  History of bilateral carpal tunnel release in the  past    Left median motor current study normal  Left ulnar motor conduction study normal  Left ulnar F-wave latency normal for height    Left median snap potential normal  Left ulnar snap potential normal  Left median palmar latency normal  Left ulnar palmar latency normal    Needle examination of the left upper extremity shows some very mild chronic motor unit changes in the first dorsal interosseous and abductor digit he minimi no evidence of any active denervation  The rest of needle examination is normal    Impression    1.  No evidence of mononeuropathy by nerve conduction studies    2.  Subtle mild chronic motor changes in the ulnar innervated hand muscles is nonspecific.  Could be seen with subtle motor ulnar neuropathy.  Localization unclear as nerve conduction studies are normal.

## 2021-03-17 NOTE — LETTER
3/17/2021         RE: Citlalli Noonan  [address]  [facility] [address]        Dear Colleague,    Thank you for referring your patient, Citlalli Noonan, to the [facility] [place]. Please see a copy of my visit note below.    See EMG note      Again, thank you for allowing me to participate in the care of your patient.        Sincerely,        Augie Kwong MD

## 2021-03-18 ENCOUNTER — HOSPITAL ENCOUNTER (OUTPATIENT)
Dept: PALLIATIVE MEDICINE | Facility: OTHER | Age: 76
Discharge: HOME | End: 2021-03-18
Attending: PHYSICIAN ASSISTANT

## 2021-03-18 DIAGNOSIS — G89.4 CHRONIC PAIN SYNDROME: ICD-10-CM

## 2021-03-18 ASSESSMENT — MIFFLIN-ST. JEOR: SCORE: 1485.19

## 2021-03-23 ENCOUNTER — RECORDS - HEALTHEAST (OUTPATIENT)
Dept: ADMINISTRATIVE | Facility: OTHER | Age: 76
End: 2021-03-23

## 2021-04-07 ENCOUNTER — COMMUNICATION - HEALTHEAST (OUTPATIENT)
Dept: PALLIATIVE MEDICINE | Facility: OTHER | Age: 76
End: 2021-04-07

## 2021-04-07 DIAGNOSIS — G89.4 CHRONIC PAIN SYNDROME: ICD-10-CM

## 2021-05-13 ENCOUNTER — HOSPITAL ENCOUNTER (OUTPATIENT)
Dept: PALLIATIVE MEDICINE | Facility: OTHER | Age: 76
Discharge: HOME | End: 2021-05-13
Attending: PHYSICIAN ASSISTANT
Payer: COMMERCIAL

## 2021-05-13 DIAGNOSIS — G89.4 CHRONIC PAIN SYNDROME: ICD-10-CM

## 2021-05-13 RX ORDER — METHADONE HYDROCHLORIDE 5 MG/1
TABLET ORAL
Qty: 140 TABLET | Refills: 0 | Status: SHIPPED | OUTPATIENT
Start: 2021-06-14 | End: 2021-08-18

## 2021-05-13 RX ORDER — METHADONE HYDROCHLORIDE 5 MG/1
TABLET ORAL
Qty: 140 TABLET | Refills: 0 | Status: SHIPPED | OUTPATIENT
Start: 2021-05-17 | End: 2021-08-18

## 2021-05-13 ASSESSMENT — MIFFLIN-ST. JEOR: SCORE: 1480.65

## 2021-05-27 VITALS — WEIGHT: 216 LBS | HEIGHT: 65 IN | BODY MASS INDEX: 35.99 KG/M2

## 2021-05-27 NOTE — TELEPHONE ENCOUNTER
Received a fax from [facility] on [address]. Recommendation made by Zainab Jackson, Pharmacist. The patient would like to have one of the following on hand  Naloxone injection or Narcan spray.

## 2021-05-28 NOTE — PROGRESS NOTES
PAIN CLINIC PROGRESS NOTE    Subjective:   Citlalli Noonan is a 73 y.o. female who presents for evaluation of low back pain.  She has lumbar degeneration, OA bilateral knees, and diabetic peripheral neuropathy.  She has continued low dose methadone as this helps pain but does have intermittent nausea/vomiting as a side effect.  Weaning off medication caused uncontrolled pain and poor sleep and trial of other extended release opioids did no help reduce neuropathic pain.  She was also to continue Endocet, topicals, and muscle relaxants.  She has been educated on lumbar sympathetic block for lower extremity neuropathy.    Major issues:  1. Chronic pain syndrome    2. Pituitary macroadenoma (H)    3. Diabetic peripheral neuropathy (H)    4. Opioid Dependence With Continuous Use       Pain Ratin/10 constant aching left sided pain. Function rated 8.   At best, pain rated 6/10.  At worst pain rated 10/10 and on average pain rated 7-8/10.    Radiation of pain: left sided leg pain ongoing and chronic to left posterior calf  Gait disturbance: occasionally uses a cane, denies recent falls.   Exacerbating factors: Stress, activity including walking or standing too long, moving  Relieving factors: Pain improves with rest, medications, elevation of feet, lidoderm patches  Associated Symptoms: Numbness in left hip/buttock, weakness in left leg, night pain.  Denies fever/chills, bowel or bladder incontinence, unexplained weight loss.   Functional Symptoms: Pain interferes with sleep, walking, work, ADLs, relationships/social, mood (angry, frustrated).  Adverse effects of medications: Nausea, vomiting intermittently. Takes Phenergan and compazine.  Constipation uses senna or ducolax suppository or Miralax prn.  Current treatment efficacy: Fair.  States she is taking methadone 10 mg in am and 15 mg at bedtime, Endocet 10/325 mg works well for breakthrough pain, doesn't take daily.  Current treatment compliance: Fair. Keeping  "follow-up appointments and taking medication as prescribed. No current PT, injections, BH.      Since last seen, her pharmacist called to recommend Narcan for home.   She was given order for intranasal spray and didn't fill it as it was over $120. She denies any new medical conditions, medications, or diagnostics since last seen.    She states her headaches on right parietal are still there daily, sometimes mild and sometimes intense.  She states she does sweat on the side of her right face.  She is taking Ibuprofen which isn't working for pain.   She is still working with neurosurgeon but needs to have MRI and they won't see her until she has it done and won't sedate her due to cost.      She states she woke up last night with a dream about her left buttock pain and states it was so bad she wanted to cut it out and woke up crying.  She states nerve pain is most severe at night.  She states it goes down her leg to her foot.  She states during the day if she is up moving and walking she doesn't feel as much.  She would like to trial medical cannabis but cannot due to out of pocket cost.  She has tried gabapentin, amitriptyline, lyrica with side effects.  She denies other neuropathic medication trials including duloxetine, topamax, or off label amantadine or namenda.  She has not taken PEA or CBD oil.  She states Endocet use is rare and only takes with severe joint/bone pain with colder weather, doesn't work well for nerve pain.    Review of Systems  A 12 point ROS was completed and was negative except for above listed and ongoing nausea, intermittent vomiting.  Weight stable, denies fever.  Sleep interrupted due to stress and pain.       Objective:     Vitals:    04/19/19 1429   BP: 108/62   Pulse: 73   Weight: 217 lb (98.4 kg)   Height: 5' 5\" (1.651 m)   PainSc:   6     Physical Exam  General- patient is alert and oriented, in NAD, well-groomed, well-nourished.  Appears uncomfortable, Obese. Presents alone today.  " Right side of head/hairline diaphoresis.  Psych- Judgment and insight normal, AOx4, speech and thought process normal, recent and remote memory normal, mood and affect concerned but pleasant.  Respiratory- breathing is non-labored, normal rhythm and rate.  Cardiovascular- extremities warm and well perfused, no peripheral edema or varicosities  Spine- normal gross alignment, no focal tenderness on palpation, no spasms  Musculoskeletal- Able to sit to stand with difficulty.  Ambulates with antalgia independently.     *Opioid [place] Precautions:    UDS/Swab - 12/27/18, results as expected  Opioid Consent - 04/19/19  Opioid Agreement - 04/19/19  Pharmacy- as documented    [place]  Reviewed - 04/19/19 as expected.  Pill Count - 10/17/16 appropriate  Psychological evaluation - n/a  MME - up to 75  Pharmacogenetic testing - n/a  EKG reviewed from 8/20/18: NSR, QT/QTc: 374/420 ms  FAUSTINO Score: 52 04/19/19    Assessment:   Citlalli Noonan is a 73 y.o. female seen in clinic today for lower extremity pain secondary to diabetic peripheral neuropathy, cervical pain with CT scan demonstrating multiple levels of moderate stenosis and degeneration, lumbar pain due to degeneration and left leg radiculopathy treated with methadone.  She is also having left hip pain secondary to left hip bursitis versus other degenerative changes and she is pending lumbar and hip MRIs but trying to find an open MRI location.  She has chronic headache with history of pituitary adenoma resection; she is following neurosurgeon with recommendation for updated MRI.  She has chronic daily headaches on right side of head. She is reporting more severe nerve pain in lower extremities, left greater then right and particularly at night.  Discuss failure of gabapentin, amitriptyline and lyrica.  Can trial topamax with benefit of headache control and weight loss.  She denies renal disease has history of 1 kidney stone distant past.  Will trial low dose and titrate  as needed.  If this fails, consider duloxetine, amantadine, PEA, or ketamine for nerve pain.    Plan:   Take methadone 10 mg in am and 15 mg at bedtime.    As your opioid dose remains stable, I will send electronic refills to the pharmacy for 2 subsequent months until your next appointment so you do not have to call our refill line.  The fill dates for your medications are:  04/26/19 & 05/26/19  Check with your pharmacy that all prescriptions are on your profile. Call the pharmacy a week before you want to fill the prescription as stock may vary and the pharmacy may need to order the medication for you. I reserve the right to cancel the electronic prescription at the pharmacy in between appointments and would contact you with an explanation if this were to occur.  Continue Endocet 10/325 mg 1-2 daily as needed for severe or breakthrough pain.  No refill needed today  For nerve pain and headaches, trial if topamax - start 50 mg at night x 1 week, if tolerated may increase to 50 mg twice a day.  If 50 mg dose is too high, cut in half to reduce to 25 mg.  Call nurse line with any concerns.  Medication information given today including possible side effects.  Recommend acupuncture visits here with Alyssa Gant LAc.  Referral placed last visit. Please check with insurance if you have coverage for these visits and how many sessions are covered per calendar year.  Opioid agreement and consent form signed today.  Follow-up in 8 weeks with Shannan Gagnon PA-C  [facility]  [address]  [place] [address]  Ph: [phone]  Fax: [phone]

## 2021-05-28 NOTE — TELEPHONE ENCOUNTER
Prior Authorization Request  Who s requesting:  Shannan Gagnon PA-C/ Rosalba Horan CMA  Pharmacy Name and Location: [facility]  Medication Name: Topiramate 50mg  Insurance Plan: Medicare  Insurance Member ID Number:  [id number]  Informed patient that prior authorizations can take up to 10 business days for response:   Yes  Okay to leave a detailed message: Yes

## 2021-05-28 NOTE — PATIENT INSTRUCTIONS - HE
Take methadone 10 mg in am and 15 mg at bedtime.    As your opioid dose remains stable, I will send electronic refills to the pharmacy for 2 subsequent months until your next appointment so you do not have to call our refill line.  The fill dates for your medications are:  04/26/19 & 05/26/19  Check with your pharmacy that all prescriptions are on your profile. Call the pharmacy a week before you want to fill the prescription as stock may vary and the pharmacy may need to order the medication for you. I reserve the right to cancel the electronic prescription at the pharmacy in between appointments and would contact you with an explanation if this were to occur.  Continue Endocet 10/325 mg 1-2 daily as needed for severe or breakthrough pain.  No refill needed today  For nerve pain and headaches, trial if topamax - start 50 mg at night x 1 week, if tolerated may increase to 50 mg twice a day.  If 50 mg dose is too high, cut in half to reduce to 25 mg.  Call nurse line with any concerns.  Medication information given today including possible side effects.  Recommend acupuncture visits here with Alyssa Gant LAc.  Referral placed last visit. Please check with insurance if you have coverage for these visits and how many sessions are covered per calendar year.  Opioid agreement and consent form signed today.  Follow-up in 8 weeks with Shannan

## 2021-05-28 NOTE — TELEPHONE ENCOUNTER
Central PA team  [phone]  Pool: HE PA MED ([id number])          PA has been initiated.       PA form completed and faxed insurance via Cover My Meds     Key:   LEOBARDO CHAPA Case ID: [id number]     Medication:  Topiramate 50MG OR TABS    Insurance:  [employer]        Response will be received via fax and may take up to 5-10 business days depending on plan

## 2021-05-29 NOTE — PROGRESS NOTES
PAIN CLINIC PROGRESS NOTE    Subjective:   Citlalli Noonan is a 73 y.o. female who presents for evaluation of low back pain.  She has lumbar degeneration, OA bilateral knees, and diabetic peripheral neuropathy.  She has continued low dose methadone as this helps pain but does have intermittent nausea/vomiting as a side effect.  Weaning off medication caused uncontrolled pain and poor sleep and trial of other extended release opioids did no help reduce neuropathic pain.  She was also to continue Endocet, topicals, and muscle relaxants.  She has been educated on lumbar sympathetic block for lower extremity neuropathy.    Major issues:  1. Chronic pain syndrome    2. Chronic, continuous use of opioids    3. Lumbar Disc Degeneration    4. Diabetic peripheral neuropathy (H)       Pain Ratin-7/10 constant aching, sometimes like bones breaking left sided pain. Function rated 7.   At best, pain rated 6/10.  At worst pain rated 9/10 and on average pain rated 7-8/10.    Radiation of pain: left sided leg pain ongoing and chronic to left posterior calf  Gait disturbance: occasionally uses a cane, denies recent falls.   Exacerbating factors: Stress, activity including walking or standing too long, moving  Relieving factors: Pain improves with rest, medications, elevation of feet, lidoderm patches  Associated Symptoms: Numbness in left hip/buttock, weakness in left leg, night pain.  Denies fever/chills, bowel or bladder incontinence, unexplained weight loss.   Functional Symptoms: Pain interferes with sleep, walking, work, ADLs, mood (frustrated).  Adverse effects of medications: Nausea, vomiting intermittently. Takes Phenergan and compazine.  Constipation uses senna or ducolax suppository or Miralax prn.  Current treatment efficacy: Fair.  States she is taking methadone 10 mg in am and 15 mg at bedtime, Endocet 10/325 mg works well for breakthrough pain, doesn't take daily.  Current treatment compliance: Fair. Keeping  follow-up appointments and taking medication as prescribed. No current PT, injections, BH.      Since last seen, she denies any new medical conditions, ED/hospital visit. She had mammogram which was negative.  She discontinued Trulicity due to side effects she states she took x 3 weeks but was constipated, nauseated with no appetite (sight of food made her want to vomit), dizzy, and still has constipation 2 weeks after stopping.  She is using dulcolax suppository.  She has tried miralax, no stool softeners.  She will restart activia.  She is drinking water; eating fruits and vegetables. She states 4 days without BM.   She is still taking metformin.  Lantus continues every morning.      Trial of topamax 50 mg two times a day was recommended for nerve pain/weight loss last visit but didn't start it as she wants to get regular BMs first and didn't want to start the same time as the Trulicity.  She has it filled and plans to start this month.    She states her headaches on right parietal are still there daily, sometimes mild and sometimes intense.  She states she does sweat on the side of her right face.  She is taking Ibuprofen which isn't working for pain.   She is still working with neurosurgeon but needs to have MRI and they won't see her until she has it done and won't sedate her due to cost.       She states nerve pain is most severe at night.  She states her sleeping position is switched to on her back so her pain has been a little better at night.  She states it goes down her left leg to her foot.  She states during the day if she is up moving and walking she doesn't feel as much.  She would like to trial medical cannabis but cannot due to out of pocket cost.  She has tried gabapentin, amitriptyline, lyrica with side effects.  She denies other neuropathic medication trials including duloxetine, topamax, or off label amantadine or namenda.  She has not taken PEA or CBD oil.  She states Endocet use is rare and  "only takes with severe joint/bone pain with colder weather, doesn't work well for nerve pain.  She did take a dose for her back pain a few days ago and that was relieving.  She last filled in 10/2018 and doesn't need a refill.    Review of Systems  A 12 point ROS was completed and was negative except for above listed and ongoing nausea, intermittent vomiting.  Weight stable, denies fever.  Sleep interrupted due to stress and pain.       Objective:     Vitals:    06/11/19 1435   BP: 111/63   Pulse: 70   Resp: 16   Weight: 217 lb (98.4 kg)   Height: 5' 5\" (1.651 m)   PainSc:   7     Physical Exam  General- patient is alert and oriented, in NAD, well-groomed, well-nourished.  Appears uncomfortable, Obese. Presents alone today.  Fanning herself in visit wearing a tanktop, room is a comfortable temperature.  Psych- Judgment and insight normal, AOx4, speech and thought process normal, recent and remote memory normal, mood and affect concerned but pleasant.  Respiratory- breathing is non-labored, normal rhythm and rate.  Cardiovascular- extremities warm and well perfused, no peripheral edema or varicosities  Spine- normal gross alignment, no focal tenderness on palpation, no spasms  Musculoskeletal- Able to sit to stand with difficulty.  Ambulates with antalgia independently.     *Opioid [place] Precautions:    UDS/Swab - 12/27/18, results as expected  Opioid Consent - 04/19/19  Opioid Agreement - 04/19/19  Pharmacy- as documented    [place]  Reviewed - 06/11/19 as expected.  Pill Count - 10/17/16 appropriate  Psychological evaluation - n/a  MME - up to 75  Pharmacogenetic testing - n/a  EKG reviewed from 8/20/18: NSR, QT/QTc: 374/420 ms  FAUSTINO 04/19/19    Assessment:   Citlalli Noonan is a 73 y.o. female seen in clinic today for lower extremity pain secondary to diabetic peripheral neuropathy, cervical pain with CT scan demonstrating multiple levels of moderate stenosis and degeneration, lumbar pain due to degeneration and " left leg radiculopathy treated with methadone.  She is also having left hip pain secondary to left hip bursitis versus other degenerative changes and she is pending lumbar and hip MRIs but trying to find an open MRI location.  She has chronic headache with history of pituitary adenoma resection; she is following neurosurgeon with recommendation for updated MRI.  She has chronic daily headaches on right side of head. She is reporting more severe nerve pain in lower extremities, left greater then right and particularly at night.  Discuss failure of gabapentin, amitriptyline and lyrica.  Can trial topamax with benefit of headache control and weight loss. Will trial low dose and titrate as needed.  If this fails, consider duloxetine, amantadine, PEA, or ketamine for nerve pain.    Plan:   Take methadone 10 mg in am and 15 mg at bedtime.    As your opioid dose remains stable, I will send electronic refills to the pharmacy for 2 subsequent months until your next appointment so you do not have to call our refill line.  The fill dates for your medications are:  06/25/19 & 07/25/19  Check with your pharmacy that all prescriptions are on your profile. Call the pharmacy a week before you want to fill the prescription as stock may vary and the pharmacy may need to order the medication for you. I reserve the right to cancel the electronic prescription at the pharmacy in between appointments and would contact you with an explanation if this were to occur.  Continue Endocet 10/325 mg 1-2 daily as needed for severe or breakthrough pain.  No refill needed today, estimates #8-10 tabs at home.  Can take Ibuprofen 800 mg 1 tab up to 3 times a day as needed for pain with food.    For nerve pain and headaches, trial of topamax - start 50 mg at night x 1 week, if tolerated may increase to 50 mg twice a day.  If 50 mg dose is too high, cut in half to reduce to 25 mg.  Call nurse line with any concerns.    Discuss side effects of Trulicity  with PCP.  For constipation, use miralax daily until regular.  OR add in Milk of magnesia.  Recommend acupuncture visits here with Alyssa Gant LAc.  Referral placed last visit. Please check with insurance if you have coverage for these visits and how many sessions are covered per calendar year.  Follow-up in 8 weeks with Shannan Gagnon PA-C  [facility]  [address]  [place] [address]  Ph: [phone]  Fax: [phone]

## 2021-05-29 NOTE — TELEPHONE ENCOUNTER
Medication being requested: topiramate 50 mg  Last visit date: 4/19 Provider: LEONEL  Next visit date: 6/11 Provider: LEONEL  Expected follow up: 8 weeks  MTM visit (Pain Center) date: no  Pertinent between visit information about requested medication (telephone, mychart, prior authorization, concerns):   topiramate 50 mg-approved on 4/22  Last date prescribed: 4/19  Provider responsible: RW  Spoke with patient: no  Script being sent to provider by nurse- dates and quantity:   Requested Prescriptions     Pending Prescriptions Disp Refills     topiramate (TOPAMAX) 50 MG tablet [Pharmacy Med Name: Topiramate Oral Tablet 50 MG] 60 tablet 0     Sig: Take 1 tablet (50 mg total) by mouth 2 (two) times a day.     Pharmacy cued: [facility]

## 2021-05-29 NOTE — PATIENT INSTRUCTIONS - HE
Take methadone 10 mg in am and 15 mg at bedtime.    As your opioid dose remains stable, I will send electronic refills to the pharmacy for 2 subsequent months until your next appointment so you do not have to call our refill line.  The fill dates for your medications are:  06/25/19 & 07/25/19  Check with your pharmacy that all prescriptions are on your profile. Call the pharmacy a week before you want to fill the prescription as stock may vary and the pharmacy may need to order the medication for you. I reserve the right to cancel the electronic prescription at the pharmacy in between appointments and would contact you with an explanation if this were to occur.  Continue Endocet 10/325 mg 1-2 daily as needed for severe or breakthrough pain.  No refill needed today, estimates #8-10 tabs at home.  Can take Ibuprofen 800 mg 1 tab up to 3 times a day as needed for pain with food.    For nerve pain and headaches, trial of topamax - start 50 mg at night x 1 week, if tolerated may increase to 50 mg twice a day.  If 50 mg dose is too high, cut in half to reduce to 25 mg.  Call nurse line with any concerns.    Discuss side effects of Trulicity with PCP.  For constipation, use miralax daily until regular.  OR add in Milk of magnesia.  Recommend acupuncture visits here with Alyssa Gant LAc.  Referral placed last visit. Please check with insurance if you have coverage for these visits and how many sessions are covered per calendar year.  Follow-up in 8 weeks with Shannan

## 2021-05-30 VITALS — HEIGHT: 65 IN | BODY MASS INDEX: 36.99 KG/M2 | WEIGHT: 222 LBS

## 2021-05-30 VITALS — BODY MASS INDEX: 34.99 KG/M2 | HEIGHT: 65 IN | WEIGHT: 210 LBS

## 2021-05-30 VITALS — WEIGHT: 225.3 LBS | HEIGHT: 65 IN | BODY MASS INDEX: 37.54 KG/M2

## 2021-05-31 VITALS — HEIGHT: 65 IN | BODY MASS INDEX: 34.87 KG/M2 | WEIGHT: 209.31 LBS

## 2021-05-31 VITALS — HEIGHT: 65 IN | BODY MASS INDEX: 35.49 KG/M2 | WEIGHT: 213 LBS

## 2021-05-31 VITALS — BODY MASS INDEX: 36.15 KG/M2 | WEIGHT: 217 LBS | HEIGHT: 65 IN

## 2021-05-31 VITALS — BODY MASS INDEX: 35.49 KG/M2 | WEIGHT: 213 LBS | HEIGHT: 65 IN

## 2021-05-31 VITALS — WEIGHT: 213 LBS | HEIGHT: 65 IN | BODY MASS INDEX: 35.49 KG/M2

## 2021-05-31 VITALS — BODY MASS INDEX: 35.49 KG/M2 | HEIGHT: 65 IN | WEIGHT: 213 LBS

## 2021-05-31 NOTE — PROGRESS NOTES
"PAIN CLINIC PROGRESS NOTE    Subjective:   Citlalli Noonan is a 73 y.o. female who presents for evaluation of low back pain.  She has lumbar degeneration, OA bilateral knees, and diabetic peripheral neuropathy.  She has continued low dose methadone as this helps pain but does have intermittent nausea/vomiting as a side effect.  Weaning off medication caused uncontrolled pain and poor sleep and trial of other extended release opioids did no help reduce neuropathic pain.  She was also to continue Endocet, topicals, and muscle relaxants.  She has been educated on lumbar sympathetic block for lower extremity neuropathy.    Major issues:  1. Chronic pain syndrome    2. Chronic, continuous use of opioids    3. Diabetic peripheral neuropathy (H)    4. Degeneration of lumbar intervertebral disc    5. Pituitary adenoma (H)       Pain Ratin-7/10 constant \"toothache\" left sided pain. Function rated 8.   At best, pain rated 6/10.  At worst pain rated 10/10 and on average pain rated 6-7/10.    Radiation of pain: left sided leg pain ongoing and chronic to left posterior calf  Gait disturbance: occasionally uses a cane, denies recent falls.   Exacerbating factors: Stress, activity including walking or standing too long, moving  Relieving factors: Pain improves with rest, medications, elevation of feet, lidoderm patches  Associated Symptoms: Numbness in left hip/buttock, weakness in left leg, night pain.  Denies fever/chills, bowel or bladder incontinence, unexplained weight loss.   Functional Symptoms: Pain interferes with sleep, work, ADLs, relationships/social, mood (frustrated).  Adverse effects of medications: Nausea, vomiting intermittently. Takes Phenergan and compazine.  Constipation uses senna or ducolax suppository or Miralax prn.  Current treatment efficacy: Fair.  States she is taking methadone 10 mg in am and 15 mg at bedtime, Endocet 10/325 mg works well for breakthrough pain, doesn't take daily.  Current " "treatment compliance: Fair. Keeping follow-up appointments and taking medication as prescribed. No current PT, injections, BH.  Exercising at home with light weights and walking.  She has access to [facility] through [employer].    Since last seen, she denies any new medical conditions, ED/hospital visit.  She was seen by Dr. Jesus Parmar on 06/18/19 reviewed today  \"PLAN: Patient needs to work hard on diet with reduced calorie and carbohydrate count. Increase physical activity and weight loss would help. She is reluctant to consider any additional medications. Patient will work with diabetes educator and dietitian and referrals have been placed. We could do a one-week professional CGM and determine her glucose patterns so medications could be adjusted.  Patient was given referral for orthotics for diabetic shoes.    Following referrals were done today   AMB CONSULT TO DIETICIAN  AMB CONSULT TO DIABETES EDUCATION  AMB CONSULT FOR ORTHOTICS    Rx's can be refilled for 6 months from the end date of last refill or the date of this visit whichever occurs later    Patient advised to see me in 3 months    Jesus Parmar MD .................... 6/18/2019 1:20 PM\"    She had eye exam on 07/23/19.    She states headaches have worsened since last visit.  She reports this weekend her refrigerator went out and she was bending over a lot to try and empty all the food out and clean the new one and she reports this aggravated her pain.  She states she did try topamax 50 mg at night and felt \"loopy\" the next day and was driving somewhere and didn't like how she felt.  Took for 3 days.  She did not try to reduce dose to 25 mg but did stop taking.  She has right frontal and orbital headache and will be seeing ophthalmologist. She has neurologist Dr. Gresham but has refused his recommendation for repeat MRI due to claustrophobia.      She states lower back pain has been really good the past 2 weeks. Some days the month " "prior her low back pain was bad when she would do more activity such as vacuuming without taking breaks.  She states she had difficulty getting up to do things and her niece stayed with her for a few days.  Most pain in axial low back where surgical site was.  Took Endocet twice during this flare and has #5 tabs remaining from 10/2018 filled for #60 tabs.      She states nerve pain is most severe at night.  She states her sleeping position is switched to on her back so her pain has been a little better at night.  She states it goes down her left leg to her foot.  She states during the day if she is up moving and walking she doesn't feel as much.  She would like to trial medical cannabis but cannot due to out of pocket cost.  She has tried gabapentin, amitriptyline, lyrica with side effects.  She denies other neuropathic medication trials including duloxetine, off label amantadine or namenda.  She has not taken PEA or CBD oil.      Review of Systems  A 12 point ROS was completed and was negative except for above listed and ongoing nausea, intermittent vomiting.  Weight stable, denies fever.  Sleep interrupted due to stress and pain.       Objective:     Vitals:    08/06/19 1510   BP: 105/62   Pulse: 66   Resp: 16   Weight: 218 lb (98.9 kg)   Height: 5' 5\" (1.651 m)   PainSc:   7     Physical Exam  General- patient is alert and oriented, in NAD, well-groomed, well-nourished.  Appears comfortable, Obese. Presents alone today.   Psych- Judgment and insight normal, AOx4, speech and thought process normal, recent and remote memory normal, mood and affect concerned but pleasant.  Respiratory- breathing is non-labored, normal rhythm and rate.  Cardiovascular- extremities warm and well perfused, no peripheral edema or varicosities  Derm - exposed skin is CDI  Musculoskeletal- Able to sit to stand with difficulty.  Ambulates with antalgia independently.     *Opioid [place] Precautions:    UDS/Swab - 12/27/18, results as " expected  Opioid Consent - 04/19/19  Opioid Agreement - 04/19/19  Pharmacy- as documented    [place]  Reviewed - 08/06/19 as expected.  Pill Count - 10/17/16 appropriate  Psychological evaluation - n/a  MME - up to 75  Pharmacogenetic testing - n/a  EKG reviewed from 8/20/18: NSR, QT/QTc: 374/420 ms  FAUSTINO Score: 50 08/06/19    Assessment:   Citlalli Noonan is a 73 y.o. female seen in clinic today for lower extremity pain secondary to diabetic peripheral neuropathy, cervical pain with CT scan demonstrating multiple levels of moderate stenosis and degeneration, lumbar pain due to degeneration and left leg radiculopathy treated with methadone.  She is also having left hip pain secondary to left hip bursitis versus other degenerative changes and she is pending lumbar and hip MRIs but trying to find an open MRI location.  She has chronic headache with history of pituitary adenoma resection; she is following neurosurgeon with recommendation for updated MRI.  She has chronic daily headaches on right side of head. She is reporting more severe nerve pain in lower extremities, left greater then right and particularly at night.  Discuss failure of gabapentin, amitriptyline and lyrica, and intolerance to 50 mg topamax (may consider 25 mg trial in the future until she can tolerate higher dose).  If this fails, consider duloxetine, amantadine, PEA, or ketamine for nerve pain.  Will continue Methadone for pain.  Due to chronic methadone use and the potential for QT prolongation, the patient was given an order to have a 12-lead electrocardiogram done to monitor the QT interval.  This should be completed prior to the next follow-up.     Plan:   Take methadone 10 mg in am and 15 mg at bedtime.  Refill sent for 28 days (#140 tabs) to fill on 08/24/19 to last until 09/21/19.  Due to changes in [place] laws, effective July 1, 2019, prescriptions for any opioid pain relievers can only be filled for 30 days from the date the prescription  was written, or for medications that can be refilled, any refills must be filled within 30 days of your last fill. If you do not fill within 30 days, the prescription will  and you will need a brand new prescription.   In order to provide you with continued access to your prescriptions, we will be switching your prescriptions to a 28 day supply or less. It is your responsibility to get your prescriptions filled before the 30 day expiration date. Failure to do so may cause delays in getting your medications.  Check with your pharmacy that all prescriptions are on your profile. Call the pharmacy a week before you want to fill the prescription as stock may vary and the pharmacy may need to order the medication for you. I reserve the right to cancel the electronic prescription at the pharmacy in between appointments and would contact you with an explanation if this were to occur.  We discussed today that methadone has the potential to create changes in your heart beat called QT prolongation.  Due to this, please schedule your annual 12-lead EKG prior to your next follow-up.  If there are any significant changes, you will receive a phone call.  Otherwise, we will discuss the results at your next follow-up visit.  Continue Endocet 10/325 mg 1-2 daily as needed for severe or breakthrough pain.  No refill needed today, estimates #5 tabs at home.  Can take Ibuprofen 800 mg 1 tab up to 3 times a day as needed for pain with food.    Discontinued topamax.  If we restart for headache, will recommend 25 mg dose at bedtime.  Follow-up in 6 weeks with Shannan Gagnon PA-C  [facility]  [address]  [place] [address]  Ph: [phone]  Fax: [phone]

## 2021-05-31 NOTE — PATIENT INSTRUCTIONS - HE
Take methadone 10 mg in am and 15 mg at bedtime.  Refill sent for 28 days (#140 tabs) to fill on 19 to last until 19.  Due to changes in [place] laws, effective 2019, prescriptions for any opioid pain relievers can only be filled for 30 days from the date the prescription was written, or for medications that can be refilled, any refills must be filled within 30 days of your last fill. If you do not fill within 30 days, the prescription will  and you will need a brand new prescription.   In order to provide you with continued access to your prescriptions, we will be switching your prescriptions to a 28 day supply or less. It is your responsibility to get your prescriptions filled before the 30 day expiration date. Failure to do so may cause delays in getting your medications.  Check with your pharmacy that all prescriptions are on your profile. Call the pharmacy a week before you want to fill the prescription as stock may vary and the pharmacy may need to order the medication for you. I reserve the right to cancel the electronic prescription at the pharmacy in between appointments and would contact you with an explanation if this were to occur.  Continue Endocet 10/325 mg 1-2 daily as needed for severe or breakthrough pain.  No refill needed today, estimates #5 tabs at home.  Can take Ibuprofen 800 mg 1 tab up to 3 times a day as needed for pain with food.    Follow-up in 6 weeks with Shannan

## 2021-06-01 VITALS — BODY MASS INDEX: 35.49 KG/M2 | HEIGHT: 65 IN | WEIGHT: 213 LBS

## 2021-06-01 VITALS — HEIGHT: 66 IN | BODY MASS INDEX: 35.03 KG/M2 | WEIGHT: 218 LBS

## 2021-06-01 VITALS — HEIGHT: 63 IN | WEIGHT: 219 LBS | BODY MASS INDEX: 38.8 KG/M2

## 2021-06-01 NOTE — PROGRESS NOTES
Pt is being seen today by AYLIN Genao, for refill and f/u of 7-constant, achy multisite pain.   F5

## 2021-06-01 NOTE — PROGRESS NOTES
PAIN CLINIC PROGRESS NOTE    Subjective:   Citlalli Noonan is a 74 y.o. female who presents for evaluation of low back pain.  She has lumbar degeneration, OA bilateral knees, and diabetic peripheral neuropathy.  She has continued low dose methadone as this helps pain but does have intermittent nausea/vomiting as a side effect.  Weaning off medication caused uncontrolled pain and poor sleep and trial of other extended release opioids did no help reduce neuropathic pain.  She was also to continue Endocet, topicals, and muscle relaxants.  She has been educated on lumbar sympathetic block for lower extremity neuropathy.    Major issues:  1. Chronic pain syndrome    2. Chronic, continuous use of opioids    3. Diabetic peripheral neuropathy (H)    4. Degeneration of cervical intervertebral disc    5. Cervical stenosis of spinal canal       Pain Ratin/10 constant achy pain in entire left side and neck. Function rated 5.   At best, pain rated 6-7/10.  At worst pain rated 10/10 and on average pain rated 7/10.    Radiation of pain: left sided leg pain ongoing and chronic to left posterior calf  Gait disturbance: occasionally uses a cane, denies recent falls.   Exacerbating factors: Stress, activity including walking or standing too long, moving  Relieving factors: Pain improves with rest, medications, elevation of feet, lidoderm patches  Associated Symptoms: Numbness in bilateral feet/legs (neuropathy), night pain.  Denies weakness, fever/chills, bowel or bladder incontinence, unexplained weight loss.   Functional Symptoms: Pain interferes with sleep, walking, work, mood (frustrated)  Adverse effects of medications: Nausea, vomiting intermittently. Takes Phenergan and compazine.  Constipation uses senna or ducolax suppository or Miralax prn.  Current treatment efficacy: Fair.  States she is taking methadone 10 mg in am and 15 mg at bedtime, Endocet 10/325 mg works well for breakthrough pain, doesn't take daily. Wants to  "ask about increasing methadone to have afternoon dose.  Current treatment compliance: Fair. Keeping follow-up appointments and taking medication as prescribed. No current PT, injections, BH.  Exercising at home with light weights and walking.  She has access to [facility] through [employer].    Since last seen, she denies any new medical conditions, ED/hospital visit, new diagnoses or medications.  Reports her pain has not changed and denies recent falls or new injuries.  She states neck pain is constant, cannot get it in right position.  Radiates to bilateral trapezius.  She states if she turns to the right or left it \"hurts like crazy\" She has crunching in neck with movement.  She denies numbness, tingling, weakness in arms unless vomiting.  She has had neck CT scan reviewed today (see below) which demonstrates mid to lower cervical central stenosis and facet arthropathy.  She wonders how much of her headaches is related to her neck pain.        She states lower back pain has been really good the past 2 weeks. Some days the month prior her low back pain was bad when she would do more activity such as vacuuming without taking breaks.  She states she had difficulty getting up to do things and her niece stayed with her for a few days.  Most pain in axial low back where surgical site was.      She states nerve pain is most severe at night.  She states her sleeping position is switched to on her back so her pain has been a little better at night.  She states it goes down her left leg to her foot.  She states during the day if she is up moving and walking she doesn't feel as much.  She would like to trial medical cannabis but cannot due to out of pocket cost.  She has tried gabapentin, amitriptyline, lyrica with side effects.  She denies other neuropathic medication trials including duloxetine, off label amantadine or namenda.  She has not taken PEA or CBD oil.   She stopped topamax 50 mg at night " "as it was too sedating into the next day, does not want to restart at 25 mg at this time.    Has had 4 deaths, 2 weddings, 4 birthdays this month.  Reports she has not been able to see her PCP for EKG as she had to cancel but plans to reschedule.    Review of Systems  A 12 point ROS was completed and was negative except for above listed and ongoing nausea, intermittent vomiting.  Weight stable, denies fever.  Sleep interrupted due to stress and pain.       Objective:     Vitals:    09/17/19 1100   BP: 143/67   Pulse: 71   Weight: 218 lb (98.9 kg)   Height: 5' 5\" (1.651 m)   PainSc:   7   PainLoc: Neck     Physical Exam  General- patient is alert and oriented, in NAD, well-groomed, well-nourished.  Appears comfortable, Obese. Presents alone today.   Psych- Judgment and insight normal, AOx4, speech and thought process normal, recent and remote memory normal, mood and affect concerned but pleasant.  Respiratory- breathing is non-labored, normal rhythm and rate.  Cardiovascular- extremities warm and well perfused, no peripheral edema or varicosities  Derm - exposed skin is CDI  Musculoskeletal- Able to sit to stand with difficulty.  Ambulates with antalgia independently.     *Opioid [place] Precautions:    UDS/Swab - 12/27/18, results as expected  Opioid Consent - 04/19/19  Opioid Agreement - 04/19/19  Pharmacy- as documented    [place]  Reviewed - 09/17/19 as expected.  Pill Count - 10/17/16 appropriate  Psychological evaluation - n/a  MME - increasing from 75 to 90, CDC adjusted MME is 240  Pharmacogenetic testing - n/a  EKG 8/20/18: NSR, QT/QTc: 374/420 ms  FAUSTINO 08/06/19    Assessment:   Citlalli Noonan is a 74 y.o. female seen in clinic today for lower extremity pain secondary to diabetic peripheral neuropathy, cervical pain with CT scan demonstrating multiple levels of moderate stenosis and degeneration, lumbar pain due to degeneration and left leg radiculopathy treated with methadone.  She is also having left hip " pain secondary to left hip bursitis versus other degenerative changes and she is pending lumbar and hip MRIs but trying to find an open MRI location.  She has chronic headache with history of pituitary adenoma resection; she is following neurosurgeon with recommendation for updated MRI.  She has chronic daily headaches on right side of head. She is reporting more severe nerve pain in lower extremities, left greater then right and particularly at night.  Discuss failure of gabapentin, amitriptyline and lyrica, and intolerance to 50 mg topamax (may consider 25 mg trial in the future until she can tolerate higher dose).  If this fails, consider duloxetine, amantadine, PEA, or ketamine for nerve pain.  Will continue Methadone for pain with increase by 5 mg in the afternoon; discuss today further increases will not be recommended due to current MME and CDC guidelines. She will monitor for side effects including nausea/vomiting, constipation, drowsiness on increase. Due to chronic methadone use and the potential for QT prolongation, the patient was given an order to have a 12-lead electrocardiogram done to monitor the QT interval.  This should be completed prior to the next follow-up.     Plan:   Take methadone 10 mg in am, take 5 mg in pm, and 15 mg at bedtime.  Monitor for side effects on increase.  Discussed CDC guidelines and I would not be able to increase further.   To fill on 19  Due to changes in [place] laws, effective 2019, prescriptions for any opioid pain relievers can only be filled for 30 days from the date the prescription was written, or for medications that can be refilled, any refills must be filled within 30 days of your last fill. If you do not fill within 30 days, the prescription will  and you will need a brand new prescription.   In order to provide you with continued access to your prescriptions, we will be switching your prescriptions to a 28 day supply or less. It is your  responsibility to get your prescriptions filled before the 30 day expiration date. Failure to do so may cause delays in getting your medications.  Check with your pharmacy that all prescriptions are on your profile. Call the pharmacy a week before you want to fill the prescription as stock may vary and the pharmacy may need to order the medication for you. I reserve the right to cancel the electronic prescription at the pharmacy in between appointments and would contact you with an explanation if this were to occur.  We discussed today that methadone has the potential to create changes in your heart beat called QT prolongation.  Due to this, please schedule your annual 12-lead EKG prior to your next follow-up.  If there are any significant changes, you will receive a phone call.  Otherwise, we will discuss the results at your next follow-up visit.  Continue Endocet 10/325 mg 1-2 daily as needed for severe or breakthrough pain.  Refill needed today  Can take Ibuprofen 800 mg 1 tab up to 3 times a day as needed for pain with food.    Can consider topamax 25 mg at night for nerve/foot pain in the future.  For neck pain, referral given to [facility] rehab in [place] for PT assessment and treatment.  Follow-up in 8 weeks with Shannan Gagnon PA-C  [facility]  [address]  [place] [address]  Ph: [phone]  Fax: [phone]

## 2021-06-01 NOTE — PATIENT INSTRUCTIONS - HE
Take methadone 10 mg in am, take 5 mg in pm, and 15 mg at bedtime.  Monitor for side effects on increase.  Discussed CDC guidelines and I would not be able to increase further.   To fill on 19  Due to changes in [place] laws, effective 2019, prescriptions for any opioid pain relievers can only be filled for 30 days from the date the prescription was written, or for medications that can be refilled, any refills must be filled within 30 days of your last fill. If you do not fill within 30 days, the prescription will  and you will need a brand new prescription.   In order to provide you with continued access to your prescriptions, we will be switching your prescriptions to a 28 day supply or less. It is your responsibility to get your prescriptions filled before the 30 day expiration date. Failure to do so may cause delays in getting your medications.  Check with your pharmacy that all prescriptions are on your profile. Call the pharmacy a week before you want to fill the prescription as stock may vary and the pharmacy may need to order the medication for you. I reserve the right to cancel the electronic prescription at the pharmacy in between appointments and would contact you with an explanation if this were to occur.  We discussed today that methadone has the potential to create changes in your heart beat called QT prolongation.  Due to this, please schedule your annual 12-lead EKG prior to your next follow-up.  If there are any significant changes, you will receive a phone call.  Otherwise, we will discuss the results at your next follow-up visit.  Continue Endocet 10/325 mg 1-2 daily as needed for severe or breakthrough pain.  Refill needed today  Can take Ibuprofen 800 mg 1 tab up to 3 times a day as needed for pain with food.    Can consider topamax 25 mg at night for nerve/foot pain in the future.  For neck pain, referral given to [facility] rehab in [place] for PT assessment and  treatment.  Follow-up in 8 weeks with Shannan

## 2021-06-02 VITALS — WEIGHT: 217 LBS | HEIGHT: 65 IN | BODY MASS INDEX: 36.15 KG/M2

## 2021-06-02 VITALS — WEIGHT: 219 LBS | BODY MASS INDEX: 38.8 KG/M2 | HEIGHT: 63 IN

## 2021-06-02 VITALS — HEIGHT: 63 IN | BODY MASS INDEX: 38.27 KG/M2 | WEIGHT: 216 LBS

## 2021-06-02 VITALS — BODY MASS INDEX: 36.15 KG/M2 | HEIGHT: 65 IN | WEIGHT: 217 LBS

## 2021-06-02 VITALS — HEIGHT: 63 IN | WEIGHT: 216 LBS | BODY MASS INDEX: 38.27 KG/M2

## 2021-06-02 VITALS — BODY MASS INDEX: 38.27 KG/M2 | WEIGHT: 216 LBS | HEIGHT: 63 IN

## 2021-06-02 NOTE — PROGRESS NOTES
[facility] Daily Progress     Patient Name: Citlalli Noonan  Date: 10/30/2019  Visit #: 3/12  Referring Provider: Shannan Gagnon PA*  Referring Diagnosis: Cervical stenosis of spinal canal  Visit Diagnosis:     ICD-10-CM    1. Chronic neck pain M54.2     G89.29    2. Decreased range of motion of neck R29.898    3. Generalized muscle weakness M62.81        Assessment:     Pt demo's improved cervical ROM and decreased cervical muscle tone today.    Patient is benefitting from skilled physical therapy and is making steady progress toward functional goals.  Patient is appropriate to continue with skilled physical therapy intervention, as indicated by initial plan of care.    Goal Status:   Pt. will demonstrate/verbalize independence in self-management of condition in : 12 weeks    Pt. will have improved quality of sleep: with less pain;waking less times/night;in 12 weeks    Patient Turn Head: for driving;for watching traffic;for conversation;with less pain;with less difficulty;in 12 weeks - turning L can increase pain    Patient will look up / down: for drinking;for reading;with less pain;with less difficulty;in 12 weeks - IMPROVING with drinking    Pt will: be able to perform house hold activities like vacuuming with minimal pain and difficulty in 12 weeks for improved QOL.      Plan / Patient Education:     Continue with initial plan of care.   Assess response to STM cervical paraspinals and HEP.  Reassess ROM and cont with ROM and strengthening as able.    Subjective:     Pain Ratin-8/10  Pt reports she has had her L ear plugged up for about the past 3 weeks so she has felt a little dizzier with that. Pt has also had some increased neck pain around the L ear that may be from a possible ear infection. She will go to primary MD today for treatment for it.    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    Patient Outcome Measures:  Neck Disability Score in %: 50     Scores range from 0-100%, where a score of 0% represents minimal pain and maximal function. The minmal clinically important difference is a score reduction of 10%. FROM INITIAL    Objective:     R rotation 47 degree (was 43) and L rotation 57 degrees (was 40 degrees) beginning of session    Moderate tone and tenderness B cervical paraspinals  Mod hypomobile C2-T1 with relief of tension    Increased tension into clavicle after treatment with R rotation 53 degrees and L rotation 59 degrees      Treatment Today      TREATMENT MINUTES COMMENTS   Evaluation     Self-care/ Home management 12 Educated pt on ideal reading position in chair and in bed with pillows propping her arms to keep neck in neutral.   Manual therapy 10 Assessed cervical palpation and discussed results. Performed STM B cervical paraspinals and supine cervical jt mobs P/A and side glides grade II x30\" oscillations C2-C7.    Neuromuscular Re-education     Therapeutic Activity     Therapeutic Exercises 8 Discussed activity level and HEP since last visit. Reassessed cervical ROM beginning and end of session. Performed contract/relax with B rotation supine x5\" x3 reps each side. Reviewed and advised to continue current HEP per pt instructions and printed for home.   Gait training     Modality__________________                Total 30    Blank areas are intentional and mean the treatment did not include these items.       Randa Oliver PT, DPT, OCS, CLT  10/30/2019  8:49 AM      "

## 2021-06-02 NOTE — PROGRESS NOTES
[facility] Daily Progress     Patient Name: Citlalli Noonan  Date: 10/16/2019  Visit #:   Referring Provider: Shannan Gagnon PA*  Referring Diagnosis: Cervical stenosis of spinal canal  Visit Diagnosis:     ICD-10-CM    1. Chronic neck pain M54.2     G89.29    2. Decreased range of motion of neck R29.898    3. Generalized muscle weakness M62.81        Assessment:     Patient is benefitting from skilled physical therapy and is making steady progress toward functional goals.  Patient is appropriate to continue with skilled physical therapy intervention, as indicated by initial plan of care.    Goal Status:  Pt. will demonstrate/verbalize independence in self-management of condition in : 12 weeks  Pt. will have improved quality of sleep: with less pain;waking less times/night;in 12 weeks  Patient Turn Head: for driving;for watching traffic;for conversation;with less pain;with less difficulty;in 12 weeks  Patient will look up / down: for drinking;for reading;with less pain;with less difficulty;in 12 weeks    Pt will: be able to perform house hold activities like vacuuming with minimal pain and difficulty in 12 weeks for improved QOL.      Plan / Patient Education:     Continue with initial plan of care.   Assess response to STM cervical paraspinals for nausea.  Reassess ROM and cont with ROM and strengthening as able.    Subjective:     Pain Ratin  Pt reports her leg is really sore today.  Pt reports she has a vomiting episode this morning and that has increased her neck pain this morning. Some of the exercises feel good.    Some of the exercises make pt dizzy and nausea. Flexion increases nausea, rotation increases dizziness.     Patient Outcome Measures:  Neck Disability Score in %: 50     Scores range from 0-100%, where a score of 0% represents minimal pain and maximal function. The minmal clinically important difference is a score reduction of 10%.    Objective:     R rotation 43 degree  "(was 43) and L rotation 50 degrees (was 40 degrees) after treatment    Significant tone and tenderness R cervical paraspinals, mod L  Significant hypomobile C2-T1 with relief of tension    Decreased nausea after treatment session      Treatment Today      TREATMENT MINUTES COMMENTS   Evaluation     Self-care/ Home management 12 Educated and discussed breathing techniques to decrease constant neck tension with deep breathing and square breathing    Manual therapy 12 Assessed cervical palpation and discussed results. Performed STM R > L cervical paraspinals and supine cervical jt mobs P/A grade II x30\" oscillations C2-C7. Supine cervical distraction increased dizziness.   Neuromuscular Re-education     Therapeutic Activity     Therapeutic Exercises 14 Discussed activity level and HEP since last visit. Reassessed cervical ROM. Reviewed, modified, performed and added to HEP per pt instructions and printed for home.   Gait training     Modality__________________                Total 38    Blank areas are intentional and mean the treatment did not include these items.       Randa Oliver PT, DPT, OCS, CLT  10/16/2019  8:49 AM        "

## 2021-06-03 VITALS — BODY MASS INDEX: 36.15 KG/M2 | HEIGHT: 65 IN | WEIGHT: 217 LBS

## 2021-06-03 VITALS — BODY MASS INDEX: 36.32 KG/M2 | HEIGHT: 65 IN | WEIGHT: 218 LBS

## 2021-06-03 NOTE — PATIENT INSTRUCTIONS - HE
Take methadone 10 mg in am, take 5 mg in pm, and 15 mg at bedtime.  Refill sent to fill 19   Due to changes in [place] laws, effective 2019, prescriptions for any opioid pain relievers can only be filled for 30 days from the date the prescription was written, or for medications that can be refilled, any refills must be filled within 30 days of your last fill. If you do not fill within 30 days, the prescription will  and you will need a brand new prescription.   In order to provide you with continued access to your prescriptions, we will be switching your prescriptions to a 28 day supply or less. It is your responsibility to get your prescriptions filled before the 30 day expiration date. Failure to do so may cause delays in getting your medications.  Check with your pharmacy that all prescriptions are on your profile. Call the pharmacy a week before you want to fill the prescription as stock may vary and the pharmacy may need to order the medication for you. I reserve the right to cancel the electronic prescription at the pharmacy in between appointments and would contact you with an explanation if this were to occur.  Continue Endocet 10/325 mg 1-2 daily as needed for severe or breakthrough pain.  Refill not needed today.  Can take Ibuprofen 800 mg 1 tab up to 3 times a day as needed for pain with food.    Discussed your upcoming surgical consult; it is ok to receive additional pain medications if needed from surgery.  This is not against your agreement.  Your surgeon or PA can call here to speak with me if any questions; because surgery will be at [place] I cannot place any orders during this time.    Follow-up in 8 weeks with Shannan

## 2021-06-03 NOTE — PROGRESS NOTES
"Pt is being seen today by Shannan Gagnon, PAC, for refill and f/u of 8-constant, \"it just hurts\" lt sided multisite pain.   F6  "

## 2021-06-03 NOTE — PROGRESS NOTES
"PAIN CLINIC PROGRESS NOTE    Subjective:   Citlalli Noonan is a 74 y.o. female who presents for evaluation of low back pain.  She has lumbar degeneration, OA bilateral knees, and diabetic peripheral neuropathy.  She has continued low dose methadone as this helps pain but does have intermittent nausea/vomiting as a side effect.  Weaning off medication caused uncontrolled pain and poor sleep and trial of other extended release opioids did no help reduce neuropathic pain.  She was also to continue Endocet, topicals, and muscle relaxants.  She has been educated on lumbar sympathetic block for lower extremity neuropathy.    Major issues:  1. Chronic pain syndrome    2. Lumbar Disc Degeneration    3. Chronic, continuous use of opioids    4. Cervical stenosis of spinal canal       Pain Ratin/10 constant variable, \"hurting\" pain in entire left side and neck. Function rated 6.   At best, pain rated 6/10.  At worst pain rated 8-9/10 and on average pain rated 7/10.    Radiation of pain: left sided leg pain ongoing and chronic to left posterior calf  Gait disturbance: occasionally uses a cane, denies recent falls.   Exacerbating factors: Stress, activity including walking or standing too long, moving, sleeping  Relieving factors: Pain improves with \"not much.\"  Associated Symptoms: Numbness in bilateral feet/legs (neuropathy) and left hip, night pain.  Denies weakness, fever/chills, bowel or bladder incontinence, unexplained weight loss.   Functional Symptoms: Pain interferes with sleep, walking, work, ADLs, mood (frustrated)  Adverse effects of medications: Nausea, vomiting intermittently. Takes zofran prn.  Constipation uses senna or ducolax suppository or Miralax prn.  Uses Activia yogurt.    Current treatment efficacy: Fair.  States she is taking methadone 10 mg in am, 5 mg in pm, and 15 mg at bedtime, Endocet 10/325 mg works well for breakthrough pain, doesn't take daily.   Current treatment compliance: Good. Keeping " "follow-up appointments and taking medication as prescribed. Currently in PT.  Exercising at home with light weights and walking.  She has access to [facility] through [employer].    Since last seen, she denies any new medical conditions, ED/hospital visit, new diagnoses or medications.  Reports her pain has not changed and denies recent falls or new injuries. Reports slow and gradual weight loss 290 to 200 lbs.  She states she lost 14 pounds since last visit, she states unsure why as diet is the same, will speak to PCP about this.  She will see surgeon next week to discuss abdominoplasty.  She has spoken to PCP and is medically necessary due to recurrent fungal infections in her skin folds.  She asks about dosing methadone for surgery and if she needs to wean off.  She also asks about opioid agreement and what to do about postoperative pain management.  She took last dose of Endocet 1.5 weeks ago, she was getting garage arranged to be able to get her car in the garage for winter.  Her niece helped her for 3 days.  She doesn't take oxycodone daily.  She saw PCP on 10/30/19 for cerumen impaction and had lavage treatment.      She has been seeing PT for cervical stenosis most recent visit on 10/30/19:  \"Patient Name: Citlalli Noonan  Date: 10/30/2019  Visit #: 3/12  Referring Provider: Shannan Gagnon PA*  Referring Diagnosis: Cervical stenosis of spinal canal  Visit Diagnosis:       ICD-10-CM     1. Chronic neck pain M54.2       G89.29     2. Decreased range of motion of neck R29.898     3. Generalized muscle weakness M62.81           Assessment:      Pt demo's improved cervical ROM and decreased cervical muscle tone today.     Patient is benefitting from skilled physical therapy and is making steady progress toward functional goals.  Patient is appropriate to continue with skilled physical therapy intervention, as indicated by initial plan of care.     Goal Status:   Pt. will " "demonstrate/verbalize independence in self-management of condition in : 12 weeks     Pt. will have improved quality of sleep: with less pain;waking less times/night;in 12 weeks     Patient Turn Head: for driving;for watching traffic;for conversation;with less pain;with less difficulty;in 12 weeks - turning L can increase pain     Patient will look up / down: for drinking;for reading;with less pain;with less difficulty;in 12 weeks - IMPROVING with drinking     Pt will: be able to perform house hold activities like vacuuming with minimal pain and difficulty in 12 weeks for improved QOL.        Plan / Patient Education:      Continue with initial plan of care.   Assess response to STM cervical paraspinals and HEP.  Reassess ROM and cont with ROM and strengthening as able.\"    She states her therapist is doing a wonderful job.  She states she cannot believe how good her neck feels.  She is scheduled tomorrow.  She missed last week due to vomiting and could tell a difference.  She is attending once per week on Wednesdays.  She states her muscles were so knotted and tight and likes the manual therapy.  She is doing HEP.  Denies other modalities.      She states nerve pain is most severe at night.  She states her sleeping position is switched to on her back so her pain has been a little better at night.  She states it goes down her left leg to her foot.  She states during the day if she is up moving and walking she doesn't feel as much.  She would like to trial medical cannabis but cannot due to out of pocket cost.  She has tried gabapentin, amitriptyline, lyrica with side effects.  She denies other neuropathic medication trials including duloxetine, off label amantadine or namenda.  She has not taken PEA or CBD oil.   She stopped topamax 50 mg at night as it was too sedating into the next day, does not want to restart at 25 mg at this time.    Review of Systems  A 12 point ROS was completed and was negative except for " "above listed and ongoing nausea, intermittent vomiting.  Weight stable, denies fever.  Sleep interrupted due to stress and pain.       Objective:     Vitals:    11/12/19 1510   BP: 127/58   Pulse: 66   Weight: 218 lb (98.9 kg)   Height: 5' 5\" (1.651 m)   PainSc:   8   PainLoc: Back     Physical Exam  General- patient is alert and oriented, in NAD, well-groomed, well-nourished.  Appears comfortable, Obese. Presents alone today.   Psych- Judgment and insight normal, AOx4, speech and thought process normal, recent and remote memory normal, mood and affect concerned but pleasant.  Respiratory- breathing is non-labored, normal rhythm and rate.  Cardiovascular- extremities warm and well perfused, no peripheral edema or varicosities  Derm - exposed skin is CDI  Musculoskeletal- Able to sit to stand with difficulty.  Ambulates with antalgia independently.     *Opioid [place] Precautions:    UDS/Swab - 12/27/18, results as expected  Opioid Consent - 04/19/19  Opioid Agreement - 04/19/19  Pharmacy- as documented    [place]  Reviewed - 11/12/19 as expected.  Pill Count - 10/17/16 appropriate  Psychological evaluation - n/a  MME - increasing from 75 to 90, CDC adjusted MME is 240  Pharmacogenetic testing - n/a  EKG reviewed from 10/01/19, NSR and QT/QTc 400/419 ms  FAUSTINO 08/06/19     Assessment:   Citlalli Noonan is a 74 y.o. female seen in clinic today for lower extremity pain secondary to diabetic peripheral neuropathy, cervical pain with CT scan demonstrating multiple levels of moderate stenosis and degeneration, lumbar pain due to degeneration and left leg radiculopathy treated with methadone.  She is also having left hip pain secondary to left hip bursitis versus other degenerative changes and she is pending lumbar and hip MRIs but trying to find an open MRI location.  She has chronic headache with history of pituitary adenoma resection; she is following neurosurgeon with recommendation for updated MRI.  She has chronic " daily headaches on right side of head. She is reporting more severe nerve pain in lower extremities, left greater then right and particularly at night.  Discuss failure of gabapentin, amitriptyline and lyrica, and intolerance to 50 mg topamax (may consider 25 mg trial in the future until she can tolerate higher dose).  If this fails, consider duloxetine, amantadine, PEA, or ketamine for nerve pain.  Will continue current opioid dosing and discuss pain management in pre and postoperative acute management setting with the inpatient care team (either surgeon, pharmacist, inpatient pain provider, etc managing acute pain and notifying provider of any changes which is not against her opioid agreement.)       Plan:   Take methadone 10 mg in am, take 5 mg in pm, and 15 mg at bedtime.  Refill sent to fill 19   Due to changes in [place] laws, effective 2019, prescriptions for any opioid pain relievers can only be filled for 30 days from the date the prescription was written, or for medications that can be refilled, any refills must be filled within 30 days of your last fill. If you do not fill within 30 days, the prescription will  and you will need a brand new prescription.   In order to provide you with continued access to your prescriptions, we will be switching your prescriptions to a 28 day supply or less. It is your responsibility to get your prescriptions filled before the 30 day expiration date. Failure to do so may cause delays in getting your medications.  Check with your pharmacy that all prescriptions are on your profile. Call the pharmacy a week before you want to fill the prescription as stock may vary and the pharmacy may need to order the medication for you. I reserve the right to cancel the electronic prescription at the pharmacy in between appointments and would contact you with an explanation if this were to occur.  Continue Endocet 10/325 mg 1-2 daily as needed for severe or  breakthrough pain.  Refill not needed today.  Can take Ibuprofen 800 mg 1 tab up to 3 times a day as needed for pain with food.    Discussed your upcoming surgical consult; it is ok to receive additional pain medications if needed from surgery.  This is not against your agreement.  Your surgeon or PA can call here to speak with me if any questions; because surgery will be at [place] I cannot place any orders during this time.    Follow-up in 8 weeks with Shannan Gagnon PA-C  [facility]  [address]  [place] [address]  Ph: [phone]  Fax: [phone]

## 2021-06-04 VITALS — BODY MASS INDEX: 34.99 KG/M2 | HEIGHT: 65 IN | WEIGHT: 210 LBS

## 2021-06-04 VITALS — HEIGHT: 65 IN | BODY MASS INDEX: 35.82 KG/M2 | WEIGHT: 215 LBS

## 2021-06-04 VITALS — WEIGHT: 217 LBS | BODY MASS INDEX: 36.15 KG/M2 | HEIGHT: 65 IN

## 2021-06-04 VITALS — WEIGHT: 210 LBS | BODY MASS INDEX: 34.99 KG/M2 | HEIGHT: 65 IN

## 2021-06-05 VITALS — HEIGHT: 65 IN | BODY MASS INDEX: 36.15 KG/M2 | WEIGHT: 217 LBS

## 2021-06-05 VITALS — BODY MASS INDEX: 36.15 KG/M2 | WEIGHT: 217 LBS | HEIGHT: 65 IN

## 2021-06-05 NOTE — PROGRESS NOTES
Pt is being seen today by AYLIN Genao, for refills, UDT, FAUSTINO and f/u of 6-constant, achy back, head, neck and lt hip/leg pain.   F5

## 2021-06-05 NOTE — PROGRESS NOTES
"PAIN CLINIC PROGRESS NOTE    Subjective:   Citlalli Noonan is a 74 y.o. female who presents for evaluation of low back pain.  She has lumbar degeneration, OA bilateral knees, and diabetic peripheral neuropathy.  She has continued low dose methadone as this helps pain but does have intermittent nausea/vomiting as a side effect.  Weaning off medication caused uncontrolled pain and poor sleep and trial of other extended release opioids did no help reduce neuropathic pain.  She was also to continue Endocet, topicals, and muscle relaxants.  She has been educated on lumbar sympathetic block for lower extremity neuropathy.    Major issues:  1. Chronic pain syndrome    2. Cervical stenosis of spinal canal    3. Diabetic peripheral neuropathy (H)    4. Degeneration of lumbar intervertebral disc    5. Chronic, continuous use of opioids       Pain Ratin/10 constant aching pain in entire left side, head and neck. Function rated 5.   At best, pain rated 6/10.  At worst pain rated 10/10 and on average pain rated 8-9/10.    Radiation of pain: left sided leg pain ongoing and chronic to left posterior calf  Gait disturbance: occasionally uses a cane, denies recent falls.   Exacerbating factors: Stress, activity including walking or standing too long, moving, sleeping  Relieving factors: Pain improves with \"not much.\"  Associated Symptoms: Numbness in bilateral feet/legs (neuropathy) and left hip, night pain, weight loss 20 pounds.  Denies weakness, fever/chills, bowel or bladder incontinence.   Functional Symptoms: Pain interferes with sleep, walking, work, mood (frustrated)  Adverse effects of medications: Nausea, vomiting intermittently 2-3 times per week. Takes zofran prn.  Constipation uses senna or ducolax suppository or Miralax prn.  Uses Activia yogurt.    Current treatment efficacy: Fair.  States she is taking methadone 10 mg in am, 5 mg in pm, and 15 mg at bedtime, Endocet 10/325 mg works well for breakthrough pain, " "doesn't take daily but useful at night if pain wakes her up.   Current treatment compliance: Good. Keeping follow-up appointments and taking medication as prescribed. Currently in PT.  Exercising at home with light weights and walking.  She has access to [facility] through [employer].    Since last seen, she denies any new medical conditions, ED/hospital visit, new diagnoses or medications.  Reports her pain has not changed and denies recent falls or new injuries.   She was seen by surgeon on 11/26/19 and reviewed:  \"CONSULTATION ASSESSMENT AND PLAN:   She will need her A1c under better control. The last value I see is almost 9. She will also need a very vigorous preoperative eval to make sure she is optimized. I explained to her that she is at quite a large risk for wound breakdown and we would be doing dressing changes to get her to heal. Despite all that I think this is a significant enough problem that the panniculectomy is in the future. The question is this timing and medical optimization to reduce her risk factors as best as possible. I drew her pictures to explain the operation. I discussed drains. I quoted other morbidity and mortality issues. I am happy to see her back again at any time but I did put in a surgical case request and have urged her to recontact her primary care physician to optimize her diabetic control. Total time of consult was 60 minutes, 45 minutes of which was counseling and coordination of care.    Martin Wilkerson MD  Plastic Surgery\"    She states goal is 7% and she is currently at 8.6%.  She states she has been losing weight almost 20 pounds within last 2-3 months.      She has been seeing PT for cervical stenosis most recent visit on 11/13/19:   \"Referring Diagnosis: Cervical stenosis of spinal canal  Visit Diagnosis:       ICD-10-CM     1. Chronic neck pain M54.2       G89.29     2. Decreased range of motion of neck R29.898     3. Generalized muscle weakness " "M62.81           Assessment:      Pt demo's continued improved cervical ROM and decreased cervical muscle tone with improved levels of pain in neck this past week.     Patient is benefitting from skilled physical therapy and is making steady progress toward functional goals.  Patient is appropriate to continue with skilled physical therapy intervention, as indicated by initial plan of care.     Goal Status:   Pt. will demonstrate/verbalize independence in self-management of condition in : 12 weeks - IMPROVING     Pt. will have improved quality of sleep: with less pain;waking less times/night;in 12 weeks - IMPROVING from neck     Patient Turn [place]: for driving;for watching traffic;for conversation;with less pain;with less difficulty;in 12 weeks - IMPROVING     Patient will look up / down: for drinking;for reading;with less pain;with less difficulty;in 12 weeks - IMPROVING with drinking     Pt will: be able to perform house hold activities like vacuuming with minimal pain and difficulty in 12 weeks for improved QOL.        Plan / Patient Education:      Continue with initial plan of care.   Assess response to STM cervical paraspinals and HEP.  Reassess ROM and cont with ROM and strengthening as able. Add arm slides and SB resistance if able.\"    She states she had to quit as she couldn't afford the copay.  She went 4 times and did help neck, doing HEP.  She states diving here it had \"stinger\" at C7.  She denies numbness, weakness in arms.  Her left leg numbness/weakness is unchanged.  She is pending lumbar MRI next week and spine consult.    She saw Stacia Thrasher on 12/23/19:  \"Citlalli was seen today for headache, leg pain/problem and medication list update.    Diagnoses and all orders for this visit:    S/P transsphenoidal hypophysectomy (HC)  - CT HEAD BRAIN WWO; Future  - AMB CONSULT TO NEUROLOGY --[place]--; Future    Headache syndrome  - CT HEAD BRAIN WWO; Future    Partial hypopituitarism (HC)  - AMB " "CONSULT TO NEUROLOGY --[facility]--; Future    Osteoarthritis of spine with radiculopathy, lumbar region  - MR SPINE LUMBAR WO; Future  - XR HIP 2 OR 3 VIEWS W PELVIS LEFT; Future  - AMB CONSULT TO SPINE SURGEON --[place]--; Future    Degenerative cervical spinal stenosis  - AMB CONSULT TO SPINE SURGEON --[facility]--; Future  - AMB CONSULT TO NEUROLOGY --[facility]--; Future    Hemicrania  - CT HEAD BRAIN WWO; Future  - AMB CONSULT TO NEUROLOGY --[facility]--; Future    Diabetes mellitus type 2, uncontrolled, with complications (HC)  - LANTUS SOLOSTAR U-100 INSULIN 100 unit/mL (3 mL) pen; Inject 30 Units subcutaneous before bedtime. Product desired:LANTUS    Follow up with both the spine specialist and neurology.    Follow up with me in 2 months on the diabetes.    Stacia Thrasher CNP ................12/23/2019.....6:53 PM. \"      Her niece was diagnosed with ovarian cancer recently so she was at the hospital prior to [place].   She states she was sick past 3 weeks fever, chills, body aches.  No OTC medications.     She states nerve pain is most severe at night.  She states her sleeping position is switched to on her back so her pain has been a little better at night.  She states it goes down her left leg to her foot.  She states during the day if she is up moving and walking she doesn't feel as much.  She would like to trial medical cannabis but cannot due to out of pocket cost.  She has tried gabapentin, amitriptyline, lyrica with side effects.  She denies other neuropathic medication trials including duloxetine, off label amantadine or namenda.  She has not taken PEA or CBD oil.   She stopped topamax 50 mg at night as it was too sedating into the next day, does not want to restart at 25 mg at this time.  She does wish to trial acupuncture and thinks it is covered on her insurance plan this year and will call to find out.    Review of Systems  A 12 point ROS was completed and was negative except for " "above listed and ongoing nausea, intermittent vomiting.  Weight stable, denies fever.  Sleep interrupted due to stress and pain.       Objective:     Vitals:    01/09/20 1443   BP: 125/59   Pulse: 68   Weight: 218 lb (98.9 kg)   Height: 5' 5\" (1.651 m)   PainSc:   6   PainLoc: Hip     Physical Exam  General- patient is alert and oriented, in NAD, well-groomed, well-nourished.  Appears comfortable, Obese. Presents alone today.   Psych- Judgment and insight normal, AOx4, speech and thought process normal, recent and remote memory normal, mood and affect concerned but pleasant.  Respiratory- breathing is non-labored, normal rhythm and rate.  Cardiovascular- extremities warm and well perfused, no peripheral edema or varicosities  Derm - exposed skin is CDI  Musculoskeletal- Able to sit to stand with difficulty.  Ambulates with antalgia independently.     *Opioid [place] Precautions:    UDS/Swab - Collected 01/09/20, results pending  Opioid Consent - 04/19/19  Opioid Agreement - 04/19/19  Pharmacy- as documented    [place]  Reviewed - 1/09/2020 as expected.  Pill Count - 10/17/16 appropriate  Psychological evaluation - n/a  MME - increasing from 75 to 90, [facility] adjusted MME is 240  Pharmacogenetic testing - n/a  EKG 10/01/19, NSR and QT/QTc 400/419 ms  FAUSTINO Score: 54 01/09/2020    Imaging:  CT Heat Brain without Contrast 01/2/2020  IMPRESSION:  1.  Unchanged postoperative appearance when compared to 08/28/2018. No new acute  intracranial abnormality.     2.  Unchanged small amount of enhancing tissue in the right aspect of the sella  which may represent normal residual pituitary tissue. No convincing evidence of  residual/recurrent tumor.    EXAM: XR HIP 2 OR 3 VIEWS W PELVIS LEFT  LOCATION: [facility]  DATE/TIME: 12/23/2019 11:54 AM    INDICATION: Osteoarthritis Of Spine With Radiculopathy, Lumbar Region  COMPARISON: 08/15/2017    Findings:  The symphysis pubis and pubic rami are normal. The hips " demonstrate a mild  degree of symmetric degenerative change characterized by central loss of  articular cartilage, minor marginal spurring and minimal sclerosis. Targeted  attention of the left hip is negative for fracture. There is a 2 cm focus of  circumscribed calcification in the subcutaneous soft tissues of the  anterolateral aspect of the left proximal thigh. These findings are stable  dating back to 2017 and likely represent an area of old osteoporosis. The sacral  wings, sacroiliac joints and iliac wings are negative.    IMPRESSION: Negative for acute bony abnormality. Mild symmetric degenerative  changes in both hips.      Assessment:   Citlalli Noonan is a 74 y.o. female seen in clinic today for lower extremity pain secondary to diabetic peripheral neuropathy, cervical pain with CT scan demonstrating multiple levels of moderate stenosis and degeneration, lumbar pain due to degeneration and left leg radiculopathy treated with methadone.  She is having updated lumbar MRI pending and possible spine surgery consult through PCP referrals.  She has chronic headache with history of pituitary adenoma resection; most recent head/brain CT scan reassuring.  She has chronic daily headaches on right side of head. She is reporting more severe nerve pain in lower extremities, left greater then right and particularly at night.  Discuss failure of gabapentin, amitriptyline and lyrica, and intolerance to 50 mg topamax (may consider 25 mg trial in the future until she can tolerate higher dose).  If this fails, consider duloxetine, amantadine, PEA, or ketamine for nerve pain, she doesn't wish to make any changes today.  She is interested in acupuncture trial and will have patient check on insurance coverage as she had to stop PT sessions due to copay costs.  Will continue current opioid dosing.     Plan:   Take methadone 10 mg in am, take 5 mg in pm, and 15 mg at bedtime.  Refill sent to fill 1/09/2020  Check with your  pharmacy that all prescriptions are on your profile. Call the pharmacy a week before you want to fill the prescription as stock may vary and the pharmacy may need to order the medication for you. I reserve the right to cancel the electronic prescription at the pharmacy in between appointments and would contact you with an explanation if this were to occur.  Continue Endocet 10/325 mg 1-2 daily as needed for severe or breakthrough pain.  Refill not needed today.  Can take Ibuprofen 800 mg 1 tab up to 3 times a day as needed for pain with food.  Refill sent today.    Discussed your upcoming lumbar MRI and make appointment with spine surgeon in follow-up for recommendations.  Check on acupuncture coverage with insurance - who is in network (we have Alyssa Gant LAc here at pain center), how many sessions per year, is there a copay cost per visit  Diagnostics: UDT/Blood collected today results are pending.  Patient required a random Drug Test due to the need to comply with [place] Model Policy Guidelines and CDC Guideline for the use of any controlled substances. Reasons for definitive testing include:  -Identify specific medication(s) or drug(s) in a class (e.g. Benzodiazepines, barbiturates, opioids, antidepressants)  -Definitive concentration of medication(s), drug(s), and/or metabolites needed to guide management  -Identify non-prescribed medication or illicit drug use for ongoing safe prescribing of controlled substances  -Identify substances that may present high risk for additive or synergistic interaction with prescription medication (e.g. Alcohol).  Patient is either being considered for or taking a controlled substance. Unexpected findings will be discussed and treatment decision may be adjusted. Testing is being implemented w/o bias related to age, race, gender, socioeconomic status or Spiritism affiliation.   Follow-up in 8 weeks with Shannan Gagnon PA-C  [facility]  [address]  [place] [address]  Ph: [phone]  Fax: [phone]

## 2021-06-05 NOTE — TELEPHONE ENCOUNTER
Please request pill count for patient ok to call on 01/16/20 to come in on 01/17/2020.  She reported dose of oxycodone prior to UDT within detection period and this was negative.  She does not always take this medication, so unclear if she misreported the last dose or what is going on.  Would like her to bring methadone and oxycodone to have an accurate count of each.  Thanks.

## 2021-06-05 NOTE — TELEPHONE ENCOUNTER
Call placed to patient:  Pill count requested, patient will plan to bring in methadone and oxycodone in their original bottles.  She will be in tomorrow, 1/17 around 9:30 am or possibly earlier.  She will go to  to notify of the need for her pills to be counted.

## 2021-06-05 NOTE — PATIENT INSTRUCTIONS - HE
Take methadone 10 mg in am, take 5 mg in pm, and 15 mg at bedtime.  Refill sent to fill 1/09/2020  Check with your pharmacy that all prescriptions are on your profile. Call the pharmacy a week before you want to fill the prescription as stock may vary and the pharmacy may need to order the medication for you. I reserve the right to cancel the electronic prescription at the pharmacy in between appointments and would contact you with an explanation if this were to occur.  Continue Endocet 10/325 mg 1-2 daily as needed for severe or breakthrough pain.  Refill not needed today.  Can take Ibuprofen 800 mg 1 tab up to 3 times a day as needed for pain with food.  Refill sent today.    Discussed your upcoming lumbar MRI and make appointment with spine surgeon in follow-up for recommendations.  Check on acupuncture coverage with insurance - who is in network (we have Alyssa Gant LAc here at pain center), how many sessions per year, is there a copay cost per visit  Diagnostics: UDT/Blood collected today results are pending.  Patient required a random Drug Test due to the need to comply with [place] Model Policy Guidelines and CDC Guideline for the use of any controlled substances. Reasons for definitive testing include:  -Identify specific medication(s) or drug(s) in a class (e.g. Benzodiazepines, barbiturates, opioids, antidepressants)  -Definitive concentration of medication(s), drug(s), and/or metabolites needed to guide management  -Identify non-prescribed medication or illicit drug use for ongoing safe prescribing of controlled substances  -Identify substances that may present high risk for additive or synergistic interaction with prescription medication (e.g. Alcohol).  Patient is either being considered for or taking a controlled substance. Unexpected findings will be discussed and treatment decision may be adjusted. Testing is being implemented w/o bias related to age, race, gender, socioeconomic status or  Roman [employer] affiliation.   Follow-up in 8 weeks with Shannan

## 2021-06-06 NOTE — TELEPHONE ENCOUNTER
2020 Prior Authorization Request  Who's requesting PA: Pharmacy  Provider:AYLIN Genao  Pharmacy Name, Location & Phone # : [facility] [address] [place] [phone] phone  Medication Name: Methadone HCI 5 mg  Quantity: 168  Refills:  Days Supply: 28  Medication Instructions: Take 3 tablets by mouth in the morning, one tablet by mouth in the evening and 2 tablets by mouth at bedtime  Insurance Plan: Medicare, [facility]/Medicare  Insurance Member ID & GRP # : Medicare ID# [id number], are ID# [id number] Group# [id number]  CoverMyMeds Key: N/A  Informed patient that prior authorizations can take up to 10 business days for response: YES  Okay to leave a detailed message: YES    Route to: [facility] MED ([id number])

## 2021-06-06 NOTE — PROGRESS NOTES
"PAIN CLINIC PROGRESS NOTE    Subjective:   Citlalli Noonan is a 74 y.o. female who presents for evaluation of low back pain.  She has lumbar degeneration, OA bilateral knees, and diabetic peripheral neuropathy.  She has continued low dose methadone as this helps pain but does have intermittent nausea/vomiting as a side effect.  Weaning off medication caused uncontrolled pain and poor sleep and trial of other extended release opioids did no help reduce neuropathic pain.  She was also to continue Endocet, topicals, and muscle relaxants.  She has been educated on lumbar sympathetic block for lower extremity neuropathy.    Major issues:  1. Chronic pain syndrome    2. Cervical stenosis of spinal canal    3. Chronic, continuous use of opioids    4. Lumbar Disc Degeneration    5. Diabetic peripheral neuropathy (H)       Pain Rating: 10/10 constant everywhere pain \"just hurts\". Function rated 8.     Radiation of pain: left sided leg pain ongoing and chronic to left posterior calf  Gait disturbance: occasionally uses a cane, denies recent falls.   Exacerbating factors: Stress, activity including walking or standing too long, moving, sleeping, not having methadone (see below).  Relieving factors: Pain improves with \"not much.\"  Associated Symptoms: Numbness in bilateral feet/legs (neuropathy) and left hip, night pain, fever/chills (withdrawal).  Denies weakness, bowel or bladder incontinence.   Functional Symptoms: Pain interferes with sleep, walking, work, mood (frustrated)  Adverse effects of medications: Nausea, vomiting intermittently 2-3 times per week. Takes zofran prn.  Constipation uses senna or ducolax suppository or Miralax prn.  Uses Activia yogurt.    Current treatment efficacy: Poor.  States she has been out of methadone x 4 days.  Was previously taking 15 mg in am, 5 mg in pm, and 10 mg at bedtime.  Endocet 10/325 mg works well for breakthrough pain, doesn't take daily but useful at night if pain wakes her up. "   Current treatment compliance: Good. Keeping follow-up appointments, not taking medication as prescribed as she ran out over the weekend unexpectedly. Recently completed PT for cervical spine.  Exercising at home with light weights and walking.  She has access to [facility] through [employer].    Since last seen, she denies any new medical conditions, ED/hospital visit, new diagnoses or medications.    She saw her dentist for tooth extraction and waiting for partial or bridge.  She was not given additional pain medications for dental procedure.      She reports out of methadone x 4 days, states she sets up her medications in a monthly pill container and didn't realize she would be out until Friday 03/06 bedtime dose and office was closed. She reports our office made a mistake of scheduling her the week after her medications were due and she is upset about this.  She called on call physician, who was not able to fill her medication over the weekend, offered her to seek urgent care for withdrawal symptoms and could send in prescription Monday but patient declined stating she didn't want to pay $70 for urgent care copay and would wait until visit today.  Reports withdrawal symptoms of sweating, pain, and nausea, agitated.  Denies diarrhea, vomiting, headache.  She reports taking Endocet over the weekend taking 1 per day and will need a refill as she has not filled since 09/2018.      She states nerve pain is most severe at night.  She states her sleeping position is switched to on her back so her pain has been a little better at night.  She states it goes down her left leg to her foot.  She states during the day if she is up moving and walking she doesn't feel as much.  She would like to trial medical cannabis but cannot due to out of pocket cost.  She has tried gabapentin, amitriptyline, lyrica with side effects.  She denies other neuropathic medication trials including duloxetine, off label  "amantadine or namenda.  She has not taken PEA or CBD oil.   She stopped topamax 50 mg at night as it was too sedating into the next day, does not want to restart at 25 mg at this time.  She did find out that acupuncture is covered by insurance this year, interested in this.    Taking care of niece with ovarian cancer and also helping cousin with dementia.  She is doing something daily for cousin driving to appointments, personal care/errands, etc.  Hasn't had time to complete lumbar MRI but plans to do so.    Review of Systems  A 12 point ROS was completed and was negative except for above listed and ongoing nausea, intermittent vomiting.  Weight stable, denies fever.  Sleep interrupted due to stress and pain.       Objective:     Vitals:    03/10/20 1519   BP: (!) 184/77   Pulse: 77   Weight: 210 lb (95.3 kg)   Height: 5' 5\" (1.651 m)   PainSc: 10-Worst pain ever   PainLoc: Back     Physical Exam  General- patient is alert and oriented, in NAD, well-groomed, well-nourished.  Appears uncomfortable, fanning herself, Obese. Presents alone today.   Psych- Judgment and insight normal, AOx4, speech and thought process normal, recent and remote memory normal, mood and affect frustrated.  Respiratory- breathing is non-labored, normal rhythm and rate.  Cardiovascular- extremities warm and well perfused, no peripheral edema or varicosities  Derm - exposed skin is CDI  Musculoskeletal- Able to sit to stand with difficulty.  Ambulates with antalgia independently.     *Opioid [place] Precautions:    UDT/Blood - Reviewed from 01/09/20, results expected for methadone, unexpected for oxycodone as she reported within detection period, pill count requested.    Opioid Consent - 04/19/19  Opioid Agreement - 04/19/19  Pharmacy- as documented    [place]  Reviewed - 03/10/2020 as expected.  Pill Count - 01/15/20 appropriate  Psychological evaluation - n/a  MME - 90, CDC adjusted MME is 240  Pharmacogenetic testing - n/a  EKG 10/01/19, " NSR and QT/QTc 400/419 ms  FAUSTINO 01/09/2020    Imaging:  CT Head Brain without Contrast 01/2/2020  IMPRESSION:  1.  Unchanged postoperative appearance when compared to 08/28/2018. No new acute  intracranial abnormality.     2.  Unchanged small amount of enhancing tissue in the right aspect of the sella  which may represent normal residual pituitary tissue. No convincing evidence of  residual/recurrent tumor.    EXAM: XR HIP 2 OR 3 VIEWS W PELVIS LEFT  LOCATION: [facility]  DATE/TIME: 12/23/2019 11:54 AM    INDICATION: Osteoarthritis Of Spine With Radiculopathy, Lumbar Region  COMPARISON: 08/15/2017    Findings:  The symphysis pubis and pubic rami are normal. The hips demonstrate a mild  degree of symmetric degenerative change characterized by central loss of  articular cartilage, minor marginal spurring and minimal sclerosis. Targeted  attention of the left hip is negative for fracture. There is a 2 cm focus of  circumscribed calcification in the subcutaneous soft tissues of the  anterolateral aspect of the left proximal thigh. These findings are stable  dating back to 2017 and likely represent an area of old osteoporosis. The sacral  wings, sacroiliac joints and iliac wings are negative.    IMPRESSION: Negative for acute bony abnormality. Mild symmetric degenerative  changes in both hips.      Assessment:   Citlalli Noonan is a 74 y.o. female seen in clinic today for lower extremity pain secondary to diabetic peripheral neuropathy, cervical pain with CT scan demonstrating multiple levels of moderate stenosis and degeneration, lumbar pain due to degeneration and left leg radiculopathy treated with methadone.  She is having updated lumbar MRI pending and possible spine surgery consult through PCP referrals.  She has chronic daily right sided headache with history of pituitary adenoma resection; most recent head/brain CT scan reassuring.  She is reporting more severe nerve pain in lower extremities,  left greater then right and particularly at night.  Discuss failure of gabapentin, amitriptyline and lyrica, and intolerance to 50 mg topamax (may consider 25 mg trial in the future until she can tolerate higher dose).  If this fails, consider duloxetine, amantadine, PEA, or ketamine for nerve pain, she doesn't wish to make any changes today.  She is interested in acupuncture trial and will refer when patient is ready.  Will resume methadone dosing, 4 days without and discuss restarting with monitoring for side effects and taking as prescribed as it may take several days to reach steady state again.  She is educated on refill process and dates listed in AVS to follow with a home calendar or phone alarm system to keep track of refill dates to not fall due on a weekend; will continue 28 day refills so that she will stay due for medications mid week and when office staff/provider is available.     Plan:   Take methadone 15 mg in am, take 5 mg in pm, and 10 mg at bedtime.  Refill sent to fill 03/10/20 & 04/07/20 to last until 05/05/20  Check with your pharmacy that all prescriptions are on your profile. Call the pharmacy a week before you want to fill the prescription as stock may vary and the pharmacy may need to order the medication for you. I reserve the right to cancel the electronic prescription at the pharmacy in between appointments and would contact you with an explanation if this were to occur.  Continue Endocet 10/325 mg 1-2 daily as needed for severe or breakthrough pain.  Refill needed today.  Can take Ibuprofen 800 mg 1 tab up to 3 times a day as needed for pain with food.    Discussed rescheduling lumbar MRI and make appointment with spine surgeon in follow-up for recommendations.  Will wait on referral for acupuncture until you have more time in schedule  Follow-up in 8 weeks with Shannan Gagnon PA-C  [facility]  [address]  [place]  [address]  Ph: [phone]  Fax: [phone]

## 2021-06-06 NOTE — TELEPHONE ENCOUNTER
Central PA team  [phone]  Pool: HE PA MED ([id number])          PA has been initiated.       PA form completed and faxed insurance via Cover My Meds     Moe: ZENAIDA ESCOBAR (Moe: [id number])     Medication:  METHADONE 5MG    Insurance:  TYRELL        Response will be received via fax and may take up to 5-10 business days depending on plan

## 2021-06-06 NOTE — PATIENT INSTRUCTIONS - HE
Take methadone 15 mg in am, take 5 mg in pm, and 10 mg at bedtime.  Refill sent to fill 03/10/20 & 04/07/20 to last until 05/05/20  Check with your pharmacy that all prescriptions are on your profile. Call the pharmacy a week before you want to fill the prescription as stock may vary and the pharmacy may need to order the medication for you. I reserve the right to cancel the electronic prescription at the pharmacy in between appointments and would contact you with an explanation if this were to occur.  Continue Endocet 10/325 mg 1-2 daily as needed for severe or breakthrough pain.  Refill needed today.  Can take Ibuprofen 800 mg 1 tab up to 3 times a day as needed for pain with food.    Discussed rescheduling lumbar MRI and make appointment with spine surgeon in follow-up for recommendations.  Will wait on referral for acupuncture until you have more time in schedule  Follow-up in 8 weeks with Shannan

## 2021-06-07 NOTE — PATIENT INSTRUCTIONS - HE
Take methadone 15 mg in am, take 5 mg in pm, and 10 mg at bedtime.    Check with your pharmacy that all prescriptions are on your profile. Call the pharmacy a week before you want to fill the prescription as stock may vary and the pharmacy may need to order the medication for you. I reserve the right to cancel the electronic prescription at the pharmacy in between appointments and would contact you with an explanation if this were to occur.  05/05/20 & 06/02/20  Continue Endocet 10/325 mg 1-2 daily as needed for severe or breakthrough pain.  Refill not needed today.  Can take Ibuprofen 800 mg 1 tab up to 3 times a day as needed for pain with food.    Discussed rescheduling lumbar MRI and make appointment with spine surgeon in follow-up for recommendations.  This was cancelled until further notice with CDI.   Discussed using walks daily for activity and also sleep hygiene tips included  Follow-up in 8 weeks with Shannan

## 2021-06-07 NOTE — PROGRESS NOTES
PAIN CLINIC PROGRESS NOTE    Subjective:   Citlalli Noonan is a 74 y.o. female who presents for evaluation of low back pain.  She has lumbar degeneration, OA bilateral knees, and diabetic peripheral neuropathy.  She has continued low dose methadone as this helps pain but does have intermittent nausea/vomiting as a side effect.  Weaning off medication caused uncontrolled pain and poor sleep and trial of other extended release opioids did no help reduce neuropathic pain.  She was also to continue Endocet, topicals, and muscle relaxants.  She has been educated on lumbar sympathetic block for lower extremity neuropathy.    Major issues:  1. Chronic, continuous use of opioids    2. Chronic pain syndrome    3. Lumbar Disc Degeneration    4. Diabetic peripheral neuropathy (H)       Pain Ratin/10 constant. Function rated 3.     Radiation of pain: left sided leg pain ongoing and chronic to left posterior calf  Gait disturbance: occasionally uses a cane, denies recent falls.   Exacerbating factors: Stress, activity including walking or standing too long, moving, sleeping  Relieving factors: medications, walking, rest.  Associated Symptoms: Numbness in bilateral feet/legs (neuropathy) and left hip, night pain.  Denies fever/chills, weakness, bowel or bladder incontinence.   Functional Symptoms: See CMA notes  Adverse effects of medications: Nausea, vomiting intermittently 2-3 times per week. Takes zofran prn.  Constipation uses senna or ducolax suppository or Miralax prn.  Uses Activia yogurt.    Current treatment efficacy: Good.  Methadone 15 mg in am, 5 mg in pm, and 10 mg at bedtime.  Endocet 10/325 mg works well for breakthrough pain, doesn't take daily but useful at night if pain wakes her up she takes in evening and helps all night.   Current treatment compliance: Good. Keeping follow-up appointments, not taking medication as prescribed as she ran out over the weekend unexpectedly. Recently completed PT for cervical  "spine.  Exercising at home with light weights and walking.  She has access to [facility] through [employer].    Since last seen, she denies any new medical conditions, ED/hospital visit, new diagnoses or medications.  Denies recent falls or injuries.   Reports pain is worsened due to inactivity from staying at home.  She states her feet are bothersome and not walking as much.  She states her toes hurt more and her legs are swollen.  She took Endocet 10/325 mg for foot pain 4 days ago; she doesn't take often.      She states she is not sleeping as well at night; everything is \"out of whack\" with her schedule. She states she is falling asleep in her chair at 2100 and then wakes up to go to sleep at midnight.  She wants to sleep from midnight to 0500.  Sleep hygiene discussed; she does read before bedtime and limits screen time but often falls asleep reading.      She states nerve pain is most severe at night.  She states her sleeping position is switched to on her back so her pain has been a little better at night.  She states it goes down her left leg to her foot.  She states during the day if she is up moving and walking she doesn't feel as much.  She would like to trial medical cannabis but cannot due to out of pocket cost.  She has tried gabapentin, amitriptyline, lyrica with side effects.  She denies other neuropathic medication trials including duloxetine, off label amantadine or namenda.  She has not taken PEA or CBD oil.   She stopped topamax 50 mg at night as it was too sedating into the next day, does not want to restart at 25 mg at this time.  She did find out that acupuncture is covered by insurance this year, interested in this.    She feels more pain and left hip and lower back. She states she is trying to use her hallways and feels \"depressing\" to walk around at apartment with people wearing masks.  She has not had her lumbar MRI completed yet due to COVID cancellation.        She " was prescribed levaquin 500 mg x 10 days on 04/20/20 for sinus infection symptoms but when she read the side effects she decided not to take.  She states symptoms persist. She is trying to figure out what she can use for congestion.  Has not called Stacia Thrasher NP to state these concerns and is encouraged to do so.    Review of Systems  A 12 point ROS was completed and was negative except for above listed and ongoing nausea, intermittent vomiting.  Weight stable, denies fever.  Sleep interrupted due to stress and pain.      *Opioid [place] Precautions:    UDT/Blood - 01/09/20    Opioid Consent - 04/19/19  Opioid Agreement - 04/19/19  Pharmacy- as documented    [place]  Reviewed - 04/30/2020 as expected.  Pill Count - 01/15/20 appropriate  Psychological evaluation - n/a  MME - 90, [facility] adjusted MME is 240  Pharmacogenetic testing - n/a  EKG 10/01/19, NSR and QT/QTc 400/419 ms  FAUSTINO 01/09/2020    Imaging:  CT Head Brain without Contrast 01/2/2020  IMPRESSION:  1.  Unchanged postoperative appearance when compared to 08/28/2018. No new acute  intracranial abnormality.     2.  Unchanged small amount of enhancing tissue in the right aspect of the sella  which may represent normal residual pituitary tissue. No convincing evidence of  residual/recurrent tumor.    EXAM: XR HIP 2 OR 3 VIEWS W PELVIS LEFT  LOCATION: [facility]  DATE/TIME: 12/23/2019 11:54 AM    INDICATION: Osteoarthritis Of Spine With Radiculopathy, Lumbar Region  COMPARISON: 08/15/2017    Findings:  The symphysis pubis and pubic rami are normal. The hips demonstrate a mild  degree of symmetric degenerative change characterized by central loss of  articular cartilage, minor marginal spurring and minimal sclerosis. Targeted  attention of the left hip is negative for fracture. There is a 2 cm focus of  circumscribed calcification in the subcutaneous soft tissues of the  anterolateral aspect of the left proximal thigh. These findings are  stable  dating back to 2017 and likely represent an area of old osteoporosis. The sacral  wings, sacroiliac joints and iliac wings are negative.    IMPRESSION: Negative for acute bony abnormality. Mild symmetric degenerative  changes in both hips.      Assessment:   Citlalli Noonan is a 74 y.o. female seen in clinic today for lower extremity pain secondary to diabetic peripheral neuropathy, cervical pain with CT scan demonstrating multiple levels of moderate stenosis and degeneration, lumbar pain due to degeneration and left leg radiculopathy treated with methadone.  She is having updated lumbar MRI pending and possible spine surgery consult.  She has chronic daily right sided headache with history of pituitary adenoma resection; most recent head/brain CT scan reassuring.  She is reporting more severe nerve pain in lower extremities, left greater then right and particularly at night. She attributes this to limited walking and will make a point to do more activity during the day, which may also improve her sleep at night.  Reviewed sleep hygiene. Discuss failure of gabapentin, amitriptyline and lyrica, and intolerance to 50 mg topamax (may consider 25 mg trial in the future until she can tolerate higher dose).  Also can consider duloxetine, amantadine, PEA, or ketamine for nerve pain, she doesn't wish to make any changes today.  She is interested in acupuncture trial and will refer when this is resumed.      Plan:   Take methadone 15 mg in am, take 5 mg in pm, and 10 mg at bedtime.    Check with your pharmacy that all prescriptions are on your profile. Call the pharmacy a week before you want to fill the prescription as stock may vary and the pharmacy may need to order the medication for you. I reserve the right to cancel the electronic prescription at the pharmacy in between appointments and would contact you with an explanation if this were to occur.  05/05/20 & 06/02/20  Continue Endocet 10/325 mg 1-2 daily as  needed for severe or breakthrough pain.  Refill not needed today.  Can take Ibuprofen 800 mg 1 tab up to 3 times a day as needed for pain with food.    Discussed rescheduling lumbar MRI and make appointment with spine surgeon in follow-up for recommendations.  This was cancelled until further notice with CDI.   Discussed using walks daily for activity and also sleep hygiene tips included  Follow-up in 8 weeks with Shannan    Call Time: 15 minutes  Start - 1427  Stop - 1442    Shannan Gagnon PA-C  [facility]  [address]  [place] [address]  Ph: [phone]  Fax: [phone]

## 2021-06-08 NOTE — PROGRESS NOTES
PAIN CLINIC PROGRESS NOTE    Subjective:   Citlalli Noonan is a 71 y.o. female who presents for evaluation of low back pain.  She has lumbar degeneration, OA bilateral knees, and diabetic peripheral neuropathy.  She has continued low dose methadone as this helps pain but does have intermittent nausea/vomiting as a side effect.  Weaning off medication caused uncontrolled pain and poor sleep and trial of other extended release opioids did no help reduce neuropathic pain.  She was also to continue Endocet, topicals, and muscle relaxants.  She has been educated on lumbar sympathetic block for lower extremity neuropathy.    Major issues:  1. Opioid dependence, continuous    2. Chronic pain syndrome    3. Lumbar degenerative disc disease    4. Lumbar radicular pain    5. Joint Pain, Localized In More Than One Joint    6. Diabetic peripheral neuropathy    7. Pituitary tumor      Pain Ratin/10 constant aching pain left shoulder, arm, leg, lower back. Frontal headaches near ethmoid sinus. Function rated 7.    Radiation of pain: left sided leg pain ongoing and chronic.  Paresthesias, numbness, weakness: hand numbness.    Gait disturbance: occasionally uses a cane, denies recent falls.   GI/: No constipation or urinary or stool incontinence  Exacerbating factors: Stress, activity including walking or standing too long  Relieving factors: Pain improves with rest, medications, elevation of feet, lidoderm patches.  Adverse effects of medications: Nausea, vomiting intermittently. Denies constipation.    Current treatment efficacy: Fair.  States she is taking methadone 5 mg every 8-12 hours.  Endocet works well for breakthrough pain, does not take daily averaging a couple times per week.    Current treatment compliance: Fair.  Taking medication as prescribed and keeps follow-up appointments.  No current PT, injections, BH.    She states medications have not changed since last visit.  She did not start amantadine as discussed  "for augmenting pain control as she didn't want to start anything new.    She states she was diagnosed with a pituitary tumor.  She follows endocrinologist Dr. Parmar through [facility]. She states tumor is walnut size now, last CT scan through [facility]. She reports concern as it is pushing on optic nerve but she denies visual changes.  She states she waited 2 hours to see neurosurgeon Dr. Higginbotham as he was out ill and she had to wait an additional 2 weeks; she was finally seen and was told nothing could be done until May and wasn't given a reason.  She will see Dr. Parmar on 01/30/17 and wants a second opinion.      She has had congestion, mouth dryness, sinus pressure, and daily headache for past 5 weeks which she attributes to her tumor.  She denies vision changes, dizziness,  She states for symtomatic relief she is unsure what to do. She is taking cloracedin HBP.  No OTC medications for headache.  She states when it gets really severe she will take Endocet and is taking 1-2 times per week on average, requesting a refill.  Does find this extremely helpful without side effects.  Left hand aching due to Dupytrens Contracture.  Does hand therapy exercises.  Sees Dr. Mccollum at [facility].  Also noting nodules on left hand 2nd and 3rd digits DIP joint.  States hand joint injections helped but then when they wore off \"pain was worse.\"  Denies surgical discussion except she has had previous carpal tunnel release.    Review of Systems  A 12 point ROS was completed and was negative except for above listed and ongoing nausea, intermittent vomiting.  Weight stable, denies fever.  Sleep interrupted due to stress and pain.       Objective:     Vitals:    01/24/17 1003   BP: 142/71   Pulse: 71   Resp: 16   Weight: 222 lb (100.7 kg)   Height: 5' 4.5\" (1.638 m)   PainSc:   8     Physical Exam  General- patient is alert and oriented, in NAD, well-groomed, well-nourished.  Obese. Presents alone today.  Psych- Judgment and insight " normal, AOx4, speech and thought process normal, recent and remote memory normal, mood and affect concerned but pleasant.  Respiratory- breathing is non-labored, normal rhythm and rate.  Cardiovascular- extremities warm and well perfused, no peripheral edema or varicosities  Spine- normal gross alignment, no focal tenderness on palpation, no spasms  Musculoskeletal- Able to sit to stand with difficulty.  Ambulates with antalgia independently.    *Opioid [place] Precautions:    UDS/Swab - 09/27/16 appropriate  Opioid Consent -  due  Opioid Agreement - 03/23/16  Pharmacy- as documented    [place]  Reviewed - 10/17/16  Pill Count - 10/17/16 appropriate  Psychological evaluation - n/a  MME - up to 75  Pharmacogenetic testing - n/a  EKG 11/03/2016 Normal sinus rhythm and Qt/QTc 370/418 ms.      Assessment:   Citlalli Noonan is a 71 y.o. female seen in clinic today for lower extremity pain secondary to diabetic peripheral neuropathy and lumbar pain due to degeneration and left leg radiculopathy controlled with methadone.  Methadone causes side effect of intermittent nausea/vomiting but does help neuropathy. She has failed other extended release opioid medication including fentanyl and morphine, Opana ER unable to be filled.  She has failed Amitriptyline, Gabapentin, topicals for neuropathy.  Has not had sympathetic block which has been discussed.  Recommend Amantadine as this can work on the NMDA receptor and may augment pain relief with methadone but she doesn't want to start new oral medication with recent diagnosis pituitary tumor, which she is trying to treat surgically.  She is reporting headaches from tumor, she is educated on intranasal 4% lidocaine to assist with pain symptoms and she will consider but is concerned about cost.  She would like to be considered for medical cannabis for treatment of chronic pain and to avoid nausea from some of her oral pain medications in the future but declines registration today  secondary to cost.       Plan:   Continue methadone 5 mg every 8 hours.    Continue Endocet 10/325 mg 1-2 daily as needed for severe pain   Please call our Nurse Triage phone # [phone] seven days in advance for next month's opioid prescription refill to be sent electronically to your pharmacy.  Records requested from Dr. Freedom Higginbotham and [facility] today to review   Continue with Dr. Parmar for treatment recommendations of pituitary tumor  Try Lidocaine patch or ointment topically to head where pain is; also consider lidocaine 4% intranasal to try for headache relief   Follow-up in 8 weeks with Shannan Gagnon PA-C  [facility]  [address]  [place] [address]  Ph: [phone]  Fax: [phone]

## 2021-06-09 NOTE — PROGRESS NOTES
PAIN CLINIC PROGRESS NOTE    Subjective:   Citlalli Noonan is a 74 y.o. female who presents for evaluation of low back pain.  She has lumbar degeneration, OA bilateral knees, and diabetic peripheral neuropathy.  She has continued low dose methadone as this helps pain but does have intermittent nausea/vomiting as a side effect.  Weaning off medication caused uncontrolled pain and poor sleep and trial of other extended release opioids did no help reduce neuropathic pain.  She was also to continue Endocet, topicals, and muscle relaxants.  She has been educated on lumbar sympathetic block for lower extremity neuropathy.    Major issues:  1. Chronic, continuous use of opioids    2. Chronic pain syndrome    3. Lumbar Disc Degeneration    4. Lumbar radicular pain    5. Diabetic peripheral neuropathy (H)       Pain Ratin/10 constant aching sharp at bedtime pain in left leg to knee/ankle/foot, arm, lower back.  Function rated 4.     Radiation of pain: left sided leg pain ongoing and chronic to left posterior calf  Gait disturbance: occasionally uses a cane, denies recent falls.   Exacerbating factors: Stress, activity including walking or standing too long, moving, sleeping, hot weather  Relieving factors: medications, walking, rest.  Associated Symptoms: Numbness in bilateral feet/legs (neuropathy) and left hip, night pain. Denies fever/chills, weakness, bowel or bladder incontinence.   Functional Symptoms: See CMA notes  Adverse effects of medications: Nausea, vomiting intermittently 2-3 times per week. Takes zofran prn.  Constipation uses senna or ducolax suppository or Miralax prn.  Uses Activia yogurt.    Current treatment efficacy: Good.  Methadone 15 mg in am, 5 mg in pm, and 10 mg at bedtime.  Endocet 10/325 mg works well for breakthrough pain, doesn't take daily but useful at night if pain wakes her up she takes in evening and helps all night.   Current treatment compliance: Good. Keeping follow-up  "appointments, taking medication as prescribed. Recently completed PT for cervical spine.  Exercising at home with light weights and walking.      Since last seen, she denies any new medical conditions, ED/hospital visit, new diagnoses or medications.  Denies recent falls or injuries.  Last visit discussed her \"back went out\" for 2.5 weeks her pain has been higher.  More pain down left leg and could \"barely move\" and took Percocet for this. She feels left leg is \"dead\" at night after laying but this doesn't happen during the day.  Denies numbness, tingling, burning, weakness in bilateral LE.  She did report episodes of bowel incontinence x 2  but she states this has resolved since then. She states she has stopped doing activity that would reproduce her back pain.  She hasn't taken Percocet for last 1.5 weeks.  She states pain in leg is the same and never goes away, worse at night as that is the side she sleeps on.    Order for lumbar CT last visit due to increased back and leg symptoms and inability to tolerate MRI scans due to severe claustrophobia even with open and medication attempts.  She hasn't been able to do this yet due to heat and not going out of the house when too hot as it makes her feel sick.  She states she has been vomiting more frequently (not daily) in the heat and starts in the morning and throughout the day.  She states she also has sweating on right side of her head, which has been a symptom since her pituitary surgery.  She is drinking gatorade which is helping to keep hydrated.      She was seen by Dr. 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 BOTH EYES   KNOWN THYROID PATIENT...stable w PMD 2018  DRY EYES   Posterior blepharitis AS WELL - 6/10    Past dx of pituitary mass/ tumor - removed 04/2017- PATIENT DILIGENT W F/U; SCAN/ MD    Mri due  M better intraocular pressure BOTH EYES   Ocular hypertension in past w NORMAL " "testing   Intraocular pressure too hig at 23 7/2020  NORMAL 2015 visual field     Plan:  6 m - intraocular pressure   Dil winter 2021- diabetic retinopathy EVALUATION and OPTIC NERVE   Diabetic retinopathy check  Diabetic retinopathy EVALUATION - done 7/2019  RTC PRN PROMPTLY FOR S/SX PROGRESSION / DECREASING vision   HVF/OCT if dr ross does not do    Hot pack   TWICE A DAY   INCREASE/ cont ARTIFICIAL TEARS THREE TIMES A DAY   Refill Fml BOTH EYES TWO TIMES A DAY - THEN DAILY   MONITOR GRAVE'S - stable   had 60 degree visual field w dr neville for pituitary mass/ tumor- removed 4/14/17  T/c MANIFEST REFRACTION   F/U W DR ROSS AS SCHED'D THIS SUMMER 7/29   Mri done 2019 - no regrowth of pituitary  The current status of this patient s diabetic retinopathy is: None.   PATIENT working on hga1c  Vaseline to lids\"    She states nerve pain is most severe at night. She states it goes down her left leg to her foot.  She states during the day if she is up moving and walking she doesn't feel as much.  She would like to trial medical cannabis but cannot due to out of pocket cost.  She has tried gabapentin, amitriptyline, lyrica with side effects.  She denies other neuropathic medication trials including duloxetine, off label amantadine or namenda.  She has not taken PEA or CBD oil.   She stopped topamax 50 mg at night as it was too sedating into the next day, does not want to restart at 25 mg at this time.  She did find out that acupuncture is covered by insurance this year, interested in this but hasn't started due to COVID.    Recommended trial of duloxetine last visit 30 mg every day x 7 days then 60 mg daily and she reports she hasn't started yet due to vomiting.  She has filled it and plans to start when she is feeling better.    Review of Systems  A 12 point ROS was completed and was negative except for above listed and ongoing nausea, intermittent vomiting.  Weight stable, denies fever.  Sleep " interrupted due to stress and pain.      *Opioid Universal Precautions:    UDT/Blood - 01/09/20    Opioid Consent - 04/19/19  Opioid Agreement - 04/19/19  Pharmacy- as documented    [place]  Reviewed - 07/15/2020 as expected.  Pill Count - 01/15/20 appropriate  Psychological evaluation - n/a  MME - 90, [facility] adjusted MME is 240  Pharmacogenetic testing - n/a  EKG 10/01/19, NSR and QT/QTc 400/419 ms  FAUSTINO 01/09/2020    Imaging:  CT Head Brain without Contrast 01/2/2020  IMPRESSION:  1.  Unchanged postoperative appearance when compared to 08/28/2018. No new acute  intracranial abnormality.     2.  Unchanged small amount of enhancing tissue in the right aspect of the sella  which may represent normal residual pituitary tissue. No convincing evidence of  residual/recurrent tumor.    EXAM: XR HIP 2 OR 3 VIEWS W PELVIS LEFT  LOCATION: [facility]  DATE/TIME: 12/23/2019 11:54 AM    INDICATION: Osteoarthritis Of Spine With Radiculopathy, Lumbar Region  COMPARISON: 08/15/2017    Findings:  The symphysis pubis and pubic rami are normal. The hips demonstrate a mild  degree of symmetric degenerative change characterized by central loss of  articular cartilage, minor marginal spurring and minimal sclerosis. Targeted  attention of the left hip is negative for fracture. There is a 2 cm focus of  circumscribed calcification in the subcutaneous soft tissues of the  anterolateral aspect of the left proximal thigh. These findings are stable  dating back to 2017 and likely represent an area of old osteoporosis. The sacral  wings, sacroiliac joints and iliac wings are negative.    IMPRESSION: Negative for acute bony abnormality. Mild symmetric degenerative  changes in both hips.      Assessment:   Citlalli Noonan is a 74 y.o. female seen in clinic today for lower extremity pain secondary to diabetic peripheral neuropathy, cervical pain with CT scan demonstrating multiple levels of moderate stenosis and degeneration,  lumbar pain due to degeneration and left leg radiculopathy treated with methadone.  She is having updated lumbar CT pending and possible spine surgery consult.  She reports increased lumbar pain with left leg radiculopathy in L5 distribution with 2 episodes of bowel incontinence; reports she cannot do an MRI due to claustrophobia even with medications unable to tolerate and will order lumbar CT for assessment.  This is pending and she is encouraged to complete; she denies any further episodes of bowel incontinence.  Discuss failure of gabapentin, amitriptyline and lyrica, and intolerance to 50 mg topamax (may consider 25 mg trial in the future until she can tolerate higher dose).  Recommend a trial of duloxetine for chronic pain; she is educated on this medication and possible risks/side effects.      Plan:   Take methadone 15 mg in am, take 5 mg in pm, and 10 mg at bedtime.  Discussed you may trial dosing differently: 10 mg in am, 5 mg in pm, and 15 mg at bedtime OR 15 mg every 12 hours.  Check with your pharmacy that all prescriptions are on your profile. Call the pharmacy a week before you want to fill the prescription as stock may vary and the pharmacy may need to order the medication for you. I reserve the right to cancel the electronic prescription at the pharmacy in between appointments and would contact you with an explanation if this were to occur.  Refills already at pharmacy for 07/28/20 & also sent for 08/25/20  Continue Endocet 10/325 mg 1-2 daily as needed for severe or breakthrough pain.  Refill not needed today.  Can take Ibuprofen 800 mg 1 tab up to 3 times a day as needed for pain with food.    Discussed scheduling lumbar CT scan due to increased pain in left leg and lower back - patient will through [facility].  Duloxetine trial discussed - not started yet, patient has 30 mg when ready to take 1 daily x 7 days, then increase to 60 mg daily (may take 2 together or take 1 in am and 1 in pm).  Discussed  this will take up to 4 weeks to see benefits for pain. Call nurse line with any concerns once started.  Follow-up in 8 weeks with Shannan    Call Time: 15 minutes  Start - 0746  Stop - 0801    Shannan Gagnon PA-C  [facility]  [address]  [place] [address]  Ph: [phone]  Fax: [phone]

## 2021-06-09 NOTE — PROGRESS NOTES
PAIN CLINIC PROGRESS NOTE    Subjective:   Citlalli Noonan is a 74 y.o. female who presents for evaluation of low back pain.  She has lumbar degeneration, OA bilateral knees, and diabetic peripheral neuropathy.  She has continued low dose methadone as this helps pain but does have intermittent nausea/vomiting as a side effect.  Weaning off medication caused uncontrolled pain and poor sleep and trial of other extended release opioids did no help reduce neuropathic pain.  She was also to continue Endocet, topicals, and muscle relaxants.  She has been educated on lumbar sympathetic block for lower extremity neuropathy.    Major issues:  1. Chronic, continuous use of opioids    2. Chronic pain syndrome    3. Lumbar Disc Degeneration    4. Lumbar radicular pain    5. Diabetic peripheral neuropathy (H)       Pain Ratin/10 constant aching, throbbing, radiating pain in left leg to knee/ankle/foot, arm. She reports leg pain is worse. Function rated 7.     Radiation of pain: left sided leg pain ongoing and chronic to left posterior calf  Gait disturbance: occasionally uses a cane, denies recent falls.   Exacerbating factors: Stress, activity including walking or standing too long, moving, sleeping  Relieving factors: medications, walking, rest.  Associated Symptoms: Numbness in bilateral feet/legs (neuropathy) and left hip, night pain. Diarrhea x 2 with incontinence. Denies fever/chills, weakness, bowel or bladder incontinence.   Functional Symptoms: See CMA notes  Adverse effects of medications: Nausea, vomiting intermittently 2-3 times per week. Takes zofran prn.  Constipation uses senna or ducolax suppository or Miralax prn.  Uses Activia yogurt.    Current treatment efficacy: Good.  Methadone 15 mg in am, 5 mg in pm, and 10 mg at bedtime.  Endocet 10/325 mg works well for breakthrough pain, doesn't take daily but useful at night if pain wakes her up she takes in evening and helps all night.   Current treatment  "compliance: Good. Keeping follow-up appointments, taking medication as prescribed. Recently completed PT for cervical spine.  Exercising at home with light weights and walking.      Since last seen, she denies any new medical conditions, ED/hospital visit, new diagnoses or medications.  Denies recent falls or injuries.  Reports pain in her left leg has worsened since last visit.  She states she is trying to get more active as sitting around causes everything to hurt more.  She scrubbed her floors and vacuumed and then her \"back went out\" for 2.5 weeks her pain has been higher.  More pain down left leg and could \"barely move\" and took Percocet for this.  She states this was helpful for pain reduction and she states her dose depended on day, most was two times a day dosing x 4-5 days and then tapered down to 1 tab daily prn pain so she could get up and move.  She hasn't taken 1 in 3 days.  She denies use of OTC or topical medications.   She is leery of taking NSAIDs but does not have a contraindication.  She has been having more difficulty sleeping and only 2-3 hours at a time due to pain as she is sleeping on her left side.  She feels leg is \"dead\" at night after laying but this doesn't happen during the day.  Denies numbness, tingling, burning, weakness.  She does report episodes of bowel incontinence x 2 since her increase in back pain.    She states nerve pain is most severe at night. She states it goes down her left leg to her foot.  She states during the day if she is up moving and walking she doesn't feel as much.  She would like to trial medical cannabis but cannot due to out of pocket cost.  She has tried gabapentin, amitriptyline, lyrica with side effects.  She denies other neuropathic medication trials including duloxetine, off label amantadine or namenda.  She has not taken PEA or CBD oil.   She stopped topamax 50 mg at night as it was too sedating into the next day, does not want to restart at 25 mg at " this time.  She did find out that acupuncture is covered by insurance this year, interested in this but hasn't started due to COVID.    Review of Systems  A 12 point ROS was completed and was negative except for above listed and ongoing nausea, intermittent vomiting.  Weight stable, denies fever.  Sleep interrupted due to stress and pain.      *Opioid Universal Precautions:    UDT/Blood - 01/09/20    Opioid Consent - 04/19/19  Opioid Agreement - 04/19/19  Pharmacy- as documented    [place]  Reviewed - 06/25/2020 as expected.  Pill Count - 01/15/20 appropriate  Psychological evaluation - n/a  MME - 90, [facility] adjusted MME is 240  Pharmacogenetic testing - n/a  EKG 10/01/19, NSR and QT/QTc 400/419 ms  FAUSTINO 01/09/2020    Imaging:  CT Head Brain without Contrast 01/2/2020  IMPRESSION:  1.  Unchanged postoperative appearance when compared to 08/28/2018. No new acute  intracranial abnormality.     2.  Unchanged small amount of enhancing tissue in the right aspect of the sella  which may represent normal residual pituitary tissue. No convincing evidence of  residual/recurrent tumor.    EXAM: XR HIP 2 OR 3 VIEWS W PELVIS LEFT  LOCATION: [facility]  DATE/TIME: 12/23/2019 11:54 AM    INDICATION: Osteoarthritis Of Spine With Radiculopathy, Lumbar Region  COMPARISON: 08/15/2017    Findings:  The symphysis pubis and pubic rami are normal. The hips demonstrate a mild  degree of symmetric degenerative change characterized by central loss of  articular cartilage, minor marginal spurring and minimal sclerosis. Targeted  attention of the left hip is negative for fracture. There is a 2 cm focus of  circumscribed calcification in the subcutaneous soft tissues of the  anterolateral aspect of the left proximal thigh. These findings are stable  dating back to 2017 and likely represent an area of old osteoporosis. The sacral  wings, sacroiliac joints and iliac wings are negative.    IMPRESSION: Negative for acute bony  abnormality. Mild symmetric degenerative  changes in both hips.      Assessment:   Citlalli Noonan is a 74 y.o. female seen in clinic today for lower extremity pain secondary to diabetic peripheral neuropathy, cervical pain with CT scan demonstrating multiple levels of moderate stenosis and degeneration, lumbar pain due to degeneration and left leg radiculopathy treated with methadone.  She is having updated lumbar MRI pending and possible spine surgery consult.  She reports increased lumbar pain with left leg radiculopathy in L5 distribution with 2 episodes of bowel incontinence; reports she cannot do an MRI due to claustrophobia even with medications unable to tolerate and will order lumbar CT for assessment.  Discuss failure of gabapentin, amitriptyline and lyrica, and intolerance to 50 mg topamax (may consider 25 mg trial in the future until she can tolerate higher dose).  Recommend a trial of duloxetine for chronic pain; she is educated on this medication and possible risks/side effects.      Plan:   Take methadone 15 mg in am, take 5 mg in pm, and 10 mg at bedtime.    Check with your pharmacy that all prescriptions are on your profile. Call the pharmacy a week before you want to fill the prescription as stock may vary and the pharmacy may need to order the medication for you. I reserve the right to cancel the electronic prescription at the pharmacy in between appointments and would contact you with an explanation if this were to occur.  Refills sent for 06/30/20 & 07/28/20  Continue Endocet 10/325 mg 1-2 daily as needed for severe or breakthrough pain.  Refill not needed today.  Can take Ibuprofen 800 mg 1 tab up to 3 times a day as needed for pain with food.    Discussed scheduling lumbar CT scan due to increased pain in left leg and lower back   Trial duloxetine 30 mg daily x 7 days, then increase to 60 mg daily after to see if helpful for pain, primarily left leg pain.  Information included today for  possible side effects, call nurse line with any concerns.  Follow-up in 4 weeks with Shannan    Call Time: 20 minutes  Start - 1435  Stop - 1455    [facility]  [address]  [place] [address]  Ph: [phone]  Fax: [phone]

## 2021-06-09 NOTE — PROGRESS NOTES
Patient is referred by Dr. Parmar for Pituitary Macroadenoma.   SX duration: 2 months  Headaches: daily headaches in the front of her head behind eyes and back of her head  Abnormal vision changes:  denies   Ringing in the ears: yes  Balance or Gait: yes  Urinary incontinence:  denies  Slurred speech: denies   Cognitive process: word finding   Katelyn CMA

## 2021-06-09 NOTE — PROGRESS NOTES
Preop Assessment: Citlalli Noonan presents for pre-op review.  Surgeon: Dr. Gresham and Dr. Girard  Name of Surgery: TRANSSPHENOIDAL REMOVAL OF PITUITARY TUMOR PLUS HARVEST FASCIA AND MUSCLE LATERAL THIGH and BIOPSY, MASS, SKULL BASE, INTRACRANIAL, ENDOSCOPIC, TRANSSPHENOIDAL  Diagnosis: pituitary adenoma  Date of Surgery: 17  Time of Surgery: 730  Hospital: [facility]  H&P: 4-3-17 Dr. Stacia Thrasher cleared pt for surgery  History of ASA, NSAIDS, vitamin and/or herbal supplements within 10 days: No  History of blood thinners: No  History of anti-seizure med's: No  Review of systems: feels well today, has chronic sinus drainage for many years, needs head elevated when wakes up or gets nausea and vomitting    Diagnostics:  Labs: today:  WNL  CXR: 4-3-17 clear  EK-3-17 NSR  Films: MRI head 16 pushed to pacs    All questions answered regarding surgery and expected pre and postoperative course including rehabilitation phase.     Reviewed with patient: Arrive 2.5 -3 hours prior to scheduled surgery, nothing to eat or drink after midnight the night before surgery and bring all pertinent films to the hospital the day of surgery.  Continue to refrain from NSAIDS (Ibuprofen, Aleve, Naprosyn) ASA or over the counter herbal medication or supplements, anticoagulants and blood thinners.    Preop skin preparations and instructions provided.    Citlalli Bacon RN

## 2021-06-09 NOTE — PATIENT INSTRUCTIONS - HE
Take methadone 15 mg in am, take 5 mg in pm, and 10 mg at bedtime.  Discussed you may trial dosing differently: 10 mg in am, 5 mg in pm, and 15 mg at bedtime OR 15 mg every 12 hours.  Check with your pharmacy that all prescriptions are on your profile. Call the pharmacy a week before you want to fill the prescription as stock may vary and the pharmacy may need to order the medication for you. I reserve the right to cancel the electronic prescription at the pharmacy in between appointments and would contact you with an explanation if this were to occur.  Refills already at pharmacy for 07/28/20 & also sent for 08/25/20  Continue Endocet 10/325 mg 1-2 daily as needed for severe or breakthrough pain.  Refill not needed today.  Can take Ibuprofen 800 mg 1 tab up to 3 times a day as needed for pain with food.    Discussed scheduling lumbar CT scan due to increased pain in left leg and lower back - patient will through [facility].  Duloxetine trial discussed - not started yet, patient has 30 mg when ready to take 1 daily x 7 days, then increase to 60 mg daily (may take 2 together or take 1 in am and 1 in pm).  Discussed this will take up to 4 weeks to see benefits for pain. Call nurse line with any concerns once started.  Follow-up in 8 weeks with Shannan

## 2021-06-09 NOTE — PATIENT INSTRUCTIONS - HE
Take methadone 15 mg in am, take 5 mg in pm, and 10 mg at bedtime.    Check with your pharmacy that all prescriptions are on your profile. Call the pharmacy a week before you want to fill the prescription as stock may vary and the pharmacy may need to order the medication for you. I reserve the right to cancel the electronic prescription at the pharmacy in between appointments and would contact you with an explanation if this were to occur.  Refills sent for 06/30/20 & 07/28/20  Continue Endocet 10/325 mg 1-2 daily as needed for severe or breakthrough pain.  Refill not needed today.  Can take Ibuprofen 800 mg 1 tab up to 3 times a day as needed for pain with food.    Discussed scheduling lumbar CT scan due to increased pain in left leg and lower back   Trial duloxetine 30 mg daily x 7 days, then increase to 60 mg daily after to see if helpful for pain, primarily left leg pain.  Information included today for possible side effects, call nurse line with any concerns.  Follow-up in 4 weeks with Shannan    Patient Education     Duloxetine Oral capsule, gastro-resistant pellets  What is this medicine?  DULOXETINE (doo LOX e teen) is used to treat depression, anxiety, and different types of chronic pain.  This medicine may be used for other purposes; ask your health care provider or pharmacist if you have questions.  What should I tell my health care provider before I take this medicine?  They need to know if you have any of these conditions:    bipolar disorder or a family history of bipolar disorder    glaucoma    kidney disease    liver disease    suicidal thoughts or a previous suicide attempt    taken medicines called MAOIs like Carbex, Eldepryl, Marplan, Nardil, and Parnate within 14 days    an unusual reaction to duloxetine, other medicines, foods, dyes, or preservatives    pregnant or trying to get pregnant    breast-feeding  How should I use this medicine?  Take this medicine by mouth with a glass of water. Follow  the directions on the prescription label. Do not cut, crush or chew this medicine. You can take this medicine with or without food. Take your medicine at regular intervals. Do not take your medicine more often than directed. Do not stop taking this medicine suddenly except upon the advice of your doctor. Stopping this medicine too quickly may cause serious side effects or your condition may worsen.  A special MedGuide will be given to you by the pharmacist with each prescription and refill. Be sure to read this information carefully each time.  Talk to your pediatrician regarding the use of this medicine in children. While this drug may be prescribed for children as young as 7 years of age for selected conditions, precautions do apply.  Overdosage: If you think you have taken too much of this medicine contact a poison control center or emergency room at once.  NOTE: This medicine is only for you. Do not share this medicine with others.  What if I miss a dose?  If you miss a dose, take it as soon as you can. If it is almost time for your next dose, take only that dose. Do not take double or extra doses.  What may interact with this medicine?  Do not take this medicine with any of the following medications:    certain diet drugs like dexfenfluramine, fenfluramine    desvenlafaxine    linezolid    MAOIs like Azilect, Carbex, Eldepryl, Marplan, Nardil, and Parnate    methylene blue (intravenous)    milnacipran    thioridazine    venlafaxine  This medicine may also interact with the following medications:    alcohol    aspirin and aspirin-like medicines    certain antibiotics like ciprofloxacin and enoxacin    certain medicines for blood pressure, heart disease, irregular heart beat    certain medicines for depression, anxiety, or psychotic disturbances    certain medicines for migraine headache like almotriptan, eletriptan, frovatriptan, naratriptan, rizatriptan, sumatriptan, zolmitriptan    certain medicines that treat  or prevent blood clots like warfarin, enoxaparin, and dalteparin    cimetidine    fentanyl    lithium    NSAIDS, medicines for pain and inflammation, like ibuprofen or naproxen    phentermine    procarbazine    sibutramine    Kilo's wort    theophylline    tramadol    tryptophan  This list may not describe all possible interactions. Give your health care provider a list of all the medicines, herbs, non-prescription drugs, or dietary supplements you use. Also tell them if you smoke, drink alcohol, or use illegal drugs. Some items may interact with your medicine.  What should I watch for while using this medicine?  Tell your doctor if your symptoms do not get better or if they get worse. Visit your doctor or health care professional for regular checks on your progress. Because it may take several weeks to see the full effects of this medicine, it is important to continue your treatment as prescribed by your doctor.  Patients and their families should watch out for new or worsening thoughts of suicide or depression. Also watch out for sudden changes in feelings such as feeling anxious, agitated, panicky, irritable, hostile, aggressive, impulsive, severely restless, overly excited and hyperactive, or not being able to sleep. If this happens, especially at the beginning of treatment or after a change in dose, call your health care professional.  You may get drowsy or dizzy. Do not drive, use machinery, or do anything that needs mental alertness until you know how this medicine affects you. Do not stand or sit up quickly, especially if you are an older patient. This reduces the risk of dizzy or fainting spells. Alcohol may interfere with the effect of this medicine. Avoid alcoholic drinks.  This medicine can cause an increase in blood pressure. This medicine can also cause a sudden drop in your blood pressure, which may make you feel faint and increase the chance of a fall. These effects are most common when you first  start the medicine or when the dose is increased, or during use of other medicines that can cause a sudden drop in blood pressure. Check with your doctor for instructions on monitoring your blood pressure while taking this medicine.  Your mouth may get dry. Chewing sugarless gum or sucking hard candy, and drinking plenty of water may help. Contact your doctor if the problem does not go away or is severe.  What side effects may I notice from receiving this medicine?  Side effects that you should report to your doctor or health care professional as soon as possible:    allergic reactions like skin rash, itching or hives, swelling of the face, lips, or tongue    changes in blood pressure    confusion    dark urine    dizziness    fast talking and excited feelings or actions that are out of control    fast, irregular heartbeat    fever    general ill feeling or flu-like symptoms    hallucination, loss of contact with reality    light-colored stools    loss of balance or coordination    redness, blistering, peeling or loosening of the skin, including inside the mouth    right upper belly pain    seizures    suicidal thoughts or other mood changes    trouble concentrating    trouble passing urine or change in the amount of urine    unusual bleeding or bruising    unusually weak or tired    yellowing of the eyes or skin  Side effects that usually do not require medical attention (report to your doctor or health care professional if they continue or are bothersome):    blurred vision    change in appetite    change in sex drive or performance    headache    increased sweating    nausea  This list may not describe all possible side effects. Call your doctor for medical advice about side effects. You may report side effects to FDA at 2-252-FDA-6481.  Where should I keep my medicine?  Keep out of the reach of children.  Store at room temperature between 15 and 30 degrees C (59 and 86 degrees F). Throw away any unused medicine  after the expiration date.  NOTE:This sheet is a summary. It may not cover all possible information. If you have questions about this medicine, talk to your doctor, pharmacist, or health care provider. Copyright  2015 [employer]

## 2021-06-09 NOTE — PROGRESS NOTES
Patient is a 71-year-old female.  She is here with 2 sisters.  Her endocrinologist is Dr. Parmar.  She presented with headaches and sinus infections.  These symptoms led to a scan of the head.  MRI shows a 16 mm pituitary tumor.  Endocrine function is normal.  I showed the MRI pictures to the patient and her sisters.  I recommended transsphenoidal removal of the tumor with preservation of normal gland.  There appears to be a normal gland on the right.  I included in the discussion the nature of the problem, the nature of the proposed surgery, the rationale for doing it, the goals, benefits, alternatives, and significant risks and complications.  I answered all their questions.  They said they were satisfied with the explanation and understood.  They requested surgery.  They gave informed consent to go ahead with surgery.  We are working on scheduling.  I have talked to Dr. Girard.  I ordered visual fields.  The patient and her sisters are satisfied with the plan.  Apparently the patient had some trouble with nursing care at [place], so she is fine with surgery at [facility].  Total time 20 minutes, more than 50% spent counseling and/or coordinating care.

## 2021-06-09 NOTE — PROGRESS NOTES
PAIN CLINIC PROGRESS NOTE    Subjective:   Citlalli Noonan is a 71 y.o. female who presents for evaluation of low back pain.  She has lumbar degeneration, OA bilateral knees, and diabetic peripheral neuropathy.  She has continued low dose methadone as this helps pain but does have intermittent nausea/vomiting as a side effect.  Weaning off medication caused uncontrolled pain and poor sleep and trial of other extended release opioids did no help reduce neuropathic pain.  She was also to continue Endocet, topicals, and muscle relaxants.  She has been educated on lumbar sympathetic block for lower extremity neuropathy.    Major issues:  1. Chronic pain syndrome    2. Opioid Dependence With Continuous Use    3. Lumbar Disc Degeneration    4. Diabetic peripheral neuropathy      Pain Ratin/10 constant aching pain left shoulder, arm, leg, lower back.  Describes pain in left leg as a lary horse.  Frontal headaches near ethmoid sinus. Function rated 7.   Last visit pain rated 8/10.  Reports pain higher today as she did not take her methadone dose this morning as she has 3 family members in the hospital, rushing to get here on time and forgot.  Radiation of pain: left sided leg pain ongoing and chronic to left posterior calf, intense paresthesias  Paresthesias, numbness, weakness: hand numbness.    Gait disturbance: occasionally uses a cane, denies recent falls.   GI/: No constipation or urinary or stool incontinence  Exacerbating factors: Stress, activity including walking or standing too long  Relieving factors: Pain improves with rest, medications, elevation of feet, lidoderm patches.  Adverse effects of medications: Nausea, vomiting intermittently. Denies constipation.    Current treatment efficacy: Fair.  States she is taking methadone 5 mg every 8 hours.  Endocet works well for breakthrough pain, does not take daily averaging a couple times per week.    Current treatment compliance: Fair.  Taking medication as  prescribed and keeps follow-up appointments.  No current PT, injections, BH.      She states medications have not changed since last visit.  She did not start amantadine as discussed for augmenting pain control as she didn't want to start anything new prior to surgery.  She is requesting an increase in methadone to assist with increased leg pain.  She wants to discuss medical cannabis program.  This has been a previous discussion as well.  She wants to register but when she realizes the expense of $200 to do so she thinks she should wait.    She was diagnosed with a pituitary tumor.  She follows endocrinologist Dr. Parmar through [facility]. She states tumor is walnut size now, last CT scan through [facility]. She reports concern as it is pushing on optic nerve but she denies visual changes.  Dr. Gresham and Dr. Girard ENT will do tumor surgery 04/14/17 at [facility].  Unsure if she will be admitted after surgery.  She states she is scared about surgery as she has had keloids form in the past.  She is hopeful this will assist with headache symptom reduction.    Reviewed  today and fentanyl 12 mcg/hr patch was prescribed by Stacia Thrasher CNP filled on 02/06/17 for 10 patches.  I review her office note from 02/03/17 discussing pituitary macroadenoma, sinus infections, hair loss, and DM2 management.  HPI does not reflect any discussion of chronic pain, yet in the plan she is given the prescription.  No mention to stop methadone or that patient is on opioid agreement at the pain center and patient does not recall this either.  She is unsure why she was given the patch except she was told it may help her pain more and that she had tolerated it in the past. She filled the patches but did not start it yet as she has upcoming surgery and wasn't interested in med changes before then.  Phone call placed to Stacia Thrasher today at 1230 and spoke with her medical assistant who left a message that this provider would like  "a call back to discuss.      Review of Systems  A 12 point ROS was completed and was negative except for above listed and ongoing nausea, intermittent vomiting.  Weight stable, denies fever.  Sleep interrupted due to stress and pain.       Objective:     Vitals:    03/23/17 1058   BP: 130/77   Pulse: 70   Resp: 16   Weight: 210 lb (95.3 kg)   Height: 5' 4.5\" (1.638 m)   PainSc:   9     Physical Exam  General- patient is alert and oriented, in NAD, well-groomed, well-nourished.  Obese. Presents alone today.  Psych- Judgment and insight normal, AOx4, speech and thought process normal, recent and remote memory normal, mood and affect concerned but pleasant.  Respiratory- breathing is non-labored, normal rhythm and rate.  Cardiovascular- extremities warm and well perfused, no peripheral edema or varicosities  Spine- normal gross alignment, no focal tenderness on palpation, no spasms  Musculoskeletal- Able to sit to stand with difficulty.  Ambulates with antalgia independently.    *Opioid [place] Precautions:    UDS/Swab - 09/27/16 appropriate  Opioid Consent -  due  Opioid Agreement - 03/23/17  Pharmacy- as documented    [place]  Reviewed - 03/24/17 unexpected for fentanyl RX.  Pill Count - 10/17/16 appropriate  Psychological evaluation - n/a  MME - up to 75  Pharmacogenetic testing - n/a  EKG 11/03/2016 Normal sinus rhythm and Qt/QTc 370/418 ms.      Assessment:   Citlalil Noonan is a 71 y.o. female seen in clinic today for lower extremity pain secondary to diabetic peripheral neuropathy and lumbar pain due to degeneration and left leg radiculopathy controlled with methadone.  Methadone causes side effect of intermittent nausea/vomiting but does help neuropathy. She has failed other extended release opioid medication including fentanyl and morphine, Opana ER unable to be filled due to cost.  She has failed Amitriptyline, Gabapentin, topicals for neuropathy.  Has not had sympathetic block which has been discussed.  " Recommend Amantadine as this can work on the NMDA receptor and may augment pain relief with methadone but she doesn't want to start new oral medication with recent diagnosis pituitary tumor, which she is pending surgery for next month.  She is requesting temporary increase in methadone to 5 mg every 6 hours.  She is not using fentanyl prescribed by Stacia Thrasher CNP and advised that the concomitant use could cause unintended results including adverse effects or more severe respiratory depression, altered mental status, and/or death. She is given instructions how to safety dispose of the patches. She is appearing to be surprised by this discussion and I do not believe the patient was trying to be deceitful or aberrant in obtaining the prescription but rather unaware of the risks; she denies any such conversation at her pharmacy level as well.  She would like to be considered for medical cannabis for treatment of chronic pain and to avoid nausea from some of her oral pain medications in the future but declines registration today secondary to cost.       Plan:   Increase methadone to 5 mg every 6 hours.    Do NOT USE fentanyl patches, I will call Stacia Thrasher regarding this.  Dispose of patches at drop location.  [facility]:    [facility]  [address] [place], [place]  [phone]    Continue Endocet 10/325 mg 1-2 daily as needed for severe pain - no refill needed today  Please call our Nurse Triage phone # [phone] seven days in advance for next month's opioid prescription refill to be sent electronically to your pharmacy.  Discussed medical cannabis, patient will wait on registration until after pituitary tumor removal.  Opioid agreement renewed today without questions.  Follow-up in 8 weeks with Shannan Gagnon PA-C  [facility]  [address]  [place] [address]  Ph: [phone]  Fax: [phone]

## 2021-06-10 NOTE — ANESTHESIA POSTPROCEDURE EVALUATION
Patient: Citlalli Noonan  TRANSPHENOIDAL REMOVAL OF PITUITARY TUMOR PLUS HARVEST FASCIA AND MUSCLE LATERAL THIGH  Anesthesia type: general    Patient location: PACU  Last vitals:   Vitals:    04/14/17 1530   BP: 124/64   Pulse: 90   Resp: 15   Temp:    SpO2: 91%     Post vital signs: stable  Level of consciousness: awake and responds to simple questions  Post-anesthesia pain: pain controlled  Post-anesthesia nausea and vomiting: no  Pulmonary: unassisted, return to baseline  Cardiovascular: stable and blood pressure at baseline  Hydration: adequate  Anesthetic events: no    QCDR Measures:  ASA# 11 - Lynn-op Cardiac Arrest: ASA11B - Patient did NOT experience unanticipated cardiac arrest  ASA# 12 - Lynn-op Mortality Rate: ASA12B - Patient did NOT die  ASA# 13 - PACU Re-Intubation Rate: ASA13B - Patient did NOT require a new airway mgmt  ASA# 10 - Composite Anes Safety: ASA10A - No serious adverse event  ASA# 38 - New Corneal Injury: ASA38A - No new exposure keratitis or corneal abrasion in PACU    Additional Notes:

## 2021-06-10 NOTE — PROGRESS NOTES
"\"I feel GREAT!  I have my life back!\"  Pt is here for leg staple removal s/p TRANSPHENOIDAL REMOVAL OF PITUITARY TUMOR PLUS HARVEST FASCIA AND MUSCLE LATERAL THIGH on 4-14-17 by Dr. Gresham and Dr. Girard.   Before surgery, she states she had a 5-6 year history of nausea and vomiting that is completely gone since surgery.  She had frontal headaches in the months prior to surgery and those are gone too.   Nasal packing was removed one week post op and incision under her lip has healed well and she is able to get her dentures in.   Takes pain meds for her back issues.     Surgical wound on right lateral thigh WNL - CDI, no signs of infection or skin breakdown.  Incision well-healed: good skin approximation, no redness or visible/palpable edema, no tenderness to palpation.  PT. AF, denies fever, chills or sweats.  Pt. reports that the symptoms are improved from pre-op.    Staples - intact removed without difficulty. Wound prepped with Betadine before and after removal.  Surrounding skin has no signs of breakdown.  Verbal instructions regarding incision care are given.  Pt. advised to call us if any s/s of infection noted - all discussed in details.      Citlalli Bacon RN  "

## 2021-06-10 NOTE — PROGRESS NOTES
04/14/17 0815   Provider Pre-Sedation Assessment   Difficult Airway?  No   Plan for Sedation Moderate   Assessment reviewed by proceduralist Yes   Update H&P I have performed an assessment and examined the patient, as necessary, to update the patient's current status that may have changed since the prior History and Physical. The History and Physical has been reviewed and the patient's status is unchanged.

## 2021-06-10 NOTE — ANESTHESIA CARE TRANSFER NOTE
Last vitals:   Vitals:    04/14/17 1447   BP: 133/63   Pulse: 91   Resp: 16   Temp:    SpO2: 95%     Patient's level of consciousness is drowsy  Spontaneous respirations: yes  Maintains airway independently: yes  Dentition unchanged: yes  Oropharynx: oropharynx clear of all foreign objects    QCDR Measures:  ASA# 20 - Surgical Safety Checklist: ASA20A - Safety Checks Done  PQRS# 430 - Adult PONV Prevention: 4558F - Pt received => 2 anti-emetic agents (different classes) preop & intraop  ASA# 8 - Peds PONV Prevention: NA - Not pediatric patient, not GA or 2 or more risk factors NOT present  PQRS# 424 - Lynn-op Temp Management: 4559F - At least one body temp DOCUMENTED => 35.5C or 95.9F within required timeframe  PQRS# 426 - PACU Transfer Protocol: - Transfer of care checklist used  ASA# 14 - Acute Post-op Pain: ASA14B - Patient did NOT experience pain >= 7 out of 10    I completed my SBAR handoff to the receiving nurse per policy and procedure.

## 2021-06-10 NOTE — PROGRESS NOTES
CHART NOTE     DATE OF SERVICE:  2017     : 1945   71 y.o.     ASSESSMENT :   Doing well with improvement in symptoms and function.       PLAN: MRI w/wo at 3 months post op as baseline. CC to Dr. Gresham.    HPI:  Citlalli Noonan is status post TRANSPHENOIDAL REMOVAL OF PITUITARY TUMOR PLUS HARVEST FASCIA AND MUSCLE LATERAL THIGH on 17 by Dr. Gresham and Dr. Girard. 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 She presented with 5-6 year h/o N/V as well as headaches and sinus infections.  These symptoms led to a scan of the head.  MRI showed a 16 mm pituitary tumor.  Endocrine function was  Normal--referred by Dr. Jesus Parmar, [facility].  Reportedly all sx. resolved post op per patient at her (thigh) staples out visit.      TODAY, Citlalli Noonan reports no HA. Started to get a little N/V a month after surgery, found out she had a sinus infection and is now on Augmentin which is worsening matters. On Compazine which makes her sleepy. No follow up needed with Dr. Parmar, but will follow up with Dr. Girard in a week after completion of abx. Walking and active with good tamika. Retired.      PAST MEDICAL HISTORY, SURGICAL HISTORY, REVIEW OF SYMPTOMS, MEDICATIONS AND ALLERGIES:  Past medical history, surgical history, review of symptoms, medications and allergies remain unchanged.    Past Medical History:   Diagnosis Date     Anxiety      Borderline glaucoma with ocular hypertension      Chronic leg pain      Chronic neck pain      Colon polyps      Diabetic polyneuropathy      Fatty liver      HLD (hyperlipidemia)      HTN (hypertension)      Hyperlipidemia      Laryngopharyngeal reflux      Obstructive sleep apnea      Osteoarthritis of spine with radiculopathy, lumbar region      Osteopenia      Pituitary macroadenoma      Restless legs syndrome      Type II diabetes mellitus        PHYSICAL EXAM:      Neurological exam reveals:  Recent and remote memory intact, fund of knowledge wnl.     Alert and oriented x3, speech fluent and appropriate.   PERRL, EOMI, No nystagmus, Face symmetric, tongue midline, Shoulder shrug equal  Comprehension intact with 2 step crossover command.   Finger nose finger smooth and accurate  FUNG w/ good strength  Muscle Bulk and tone wnl.   Reflexes:  No pathological reflexes   Gait and station:Normal   R thigh incision is well healed scar.      RADIOGRAPHIC IMAGING: NA

## 2021-06-10 NOTE — ANESTHESIA PREPROCEDURE EVALUATION
Anesthesia Evaluation        Airway    Pulmonary    (+) sleep apnea,                          Cardiovascular   (+) hypertension, , hypercholesterolemia,      Neuro/Psych    (+) neuromuscular disease,  anxiety/panic attacks,     Comments: Glaucoma  Pituitary macroadenoma  Opioid dependence with continuous use    Endo/Other    (+) diabetes mellitus type 2 using insulin, hypothyroidism, arthritis,      GI/Hepatic/Renal    (+) GERD,             Dental                         Anesthesia Plan      ASA 3

## 2021-06-10 NOTE — PROGRESS NOTES
PAIN CLINIC PROGRESS NOTE    Subjective:   Citlalli Noonan is a 71 y.o. female who presents for evaluation of low back pain.  She has lumbar degeneration, OA bilateral knees, and diabetic peripheral neuropathy.  She has continued low dose methadone as this helps pain but does have intermittent nausea/vomiting as a side effect.  Weaning off medication caused uncontrolled pain and poor sleep and trial of other extended release opioids did no help reduce neuropathic pain.  She was also to continue Endocet, topicals, and muscle relaxants.  She has been educated on lumbar sympathetic block for lower extremity neuropathy.    Major issues:  1. Chronic pain syndrome    2. Lumbar Disc Degeneration    3. Diabetic peripheral neuropathy    4. Opioid Dependence With Continuous Use      Pain Ratin/10 constant aching pain left shoulder, arm, leg, lower back.  Denies headaches. Function rated 7.   Last visit pain rated 9/10.    Radiation of pain: left sided leg pain ongoing and chronic to left posterior calf, intense paresthesias  Paresthesias, numbness, weakness: hand numbness.    Gait disturbance: occasionally uses a cane, denies recent falls.   GI/: No constipation or urinary or stool incontinence  Exacerbating factors: Stress, activity including walking or standing too long  Relieving factors: Pain improves with rest, medications, elevation of feet, lidoderm patches.  Adverse effects of medications: Nausea, vomiting intermittently. Takes Phenergan and compazine.  Constipation uses senna or Miralax but trying not to take every day, BM every 2-3 days.  Current treatment efficacy: Good.  States she is taking methadone 5 mg every 6 hours.  Endocet works well for breakthrough pain, hasn't taken since surgery as she was prescribed oxycodone 5 mg (see below).  Current treatment compliance: Fair.  Taking medication as prescribed and keeps follow-up appointments.  No current PT, injections, BH.      She was diagnosed with a  "pituitary tumor.  She follows endocrinologist Dr. Parmar through [facility]. 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 Gresham and Dr. Girard. 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  Reviewed postoperative visit wit Angelika Sutton CNP on 05/22/17 and patient reported no headache, slight N/V which she attributed to sinus infection treated with Augmentin.  She is using radha aromatherapy to help nausea.  She states postop she had no nausea or vomiting.  She was assessed as doing well with improvement in symptoms and function. Will have MRI at 3 months postop.  Dr. Girard ENT will see her next week as she reports she has a large hole in her septum and needs to discuss treatment options for this.  Oxycodone 5 mg #30 prescribed by Citlalli Yen CNP on 04/18/17 and has completed this without remaining tablets.      She states she would like to reduce her methadone, but does not feel well enough to do so yet.  She continues 5 mg every 6 hours prn, denies side effects.    Review of Systems  A 12 point ROS was completed and was negative except for above listed and ongoing nausea, intermittent vomiting.  Weight stable, denies fever.  Sleep interrupted due to stress and pain.       Objective:     Vitals:    05/23/17 1049   BP: 111/70   Pulse: 75   Resp: 16   Weight: 217 lb (98.4 kg)   Height: 5' 5\" (1.651 m)   PainSc:   7     Physical Exam  General- patient is alert and oriented, in NAD, well-groomed, well-nourished.  Appears ill, fatigued, smelling rdaha. Obese. Presents alone today.  Psych- Judgment and insight normal, AOx4, speech and thought process normal, recent and remote memory normal, mood and affect concerned but pleasant.  Respiratory- breathing is non-labored, normal rhythm and rate.  Cardiovascular- extremities warm and well perfused, no peripheral edema or varicosities  Spine- normal gross alignment, no focal tenderness on " palpation, no spasms  Musculoskeletal- Able to sit to stand with difficulty.  Ambulates with antalgia independently.    *Opioid [place] Precautions:    UDS/Swab - 09/27/16 appropriate  Opioid Consent -  due  Opioid Agreement - 03/23/17  Pharmacy- as documented    [place]  Reviewed - 05/23/17 as expected.  Pill Count - 10/17/16 appropriate  Psychological evaluation - n/a  MME - up to 75  Pharmacogenetic testing - n/a  EKG 04/03/17 Normal sinus rhythm QT/QTc 378/413 ms reviewed today    Assessment:   Citlalli Noonan is a 71 y.o. female seen in clinic today for lower extremity pain secondary to diabetic peripheral neuropathy and lumbar pain due to degeneration and left leg radiculopathy controlled with methadone. She is educated on reducing methadone back to every 8 hour dosing with recent reduction in headaches from surgery.  She is ill with sinus infection currently and having increased headaches, nausea, but is hoping symptoms resolve with antibiotic treatment completion.  She will continue with ENT and surgical follow-up as recommended.      Plan:   Continue methadone to 5 mg every 6 hours.  Refilled today.  Discussed if you are feeling better, try to reduce back to 5 mg every 8 hours.  Continue Endocet 10/325 mg 1-2 daily as needed for severe pain - no refill needed today  Please call our Nurse Triage phone # [phone] seven days in advance for next month's opioid prescription refill to be sent electronically to your pharmacy.  Continue with ENT. Dr. Girard.  Follow-up in 8 weeks with Shannan Gagnon PA-C  [facility]  [address]  [place] [address]  Ph: [phone]  Fax: [phone]

## 2021-06-11 NOTE — PROGRESS NOTES
PAIN CLINIC PROGRESS NOTE    Subjective:   Citlalli Noonan is a 71 y.o. female who presents for evaluation of low back pain.  She has lumbar degeneration, OA bilateral knees, and diabetic peripheral neuropathy.  She has continued low dose methadone as this helps pain but does have intermittent nausea/vomiting as a side effect.  Weaning off medication caused uncontrolled pain and poor sleep and trial of other extended release opioids did no help reduce neuropathic pain.  She was also to continue Endocet, topicals, and muscle relaxants.  She has been educated on lumbar sympathetic block for lower extremity neuropathy.    Major issues:  1. Chronic pain syndrome    2. Opioid Dependence With Continuous Use    3. Diabetic peripheral neuropathy    4. Lumbar Disc Degeneration      Pain Ratin/10 constant aching pain left shoulder, arm, leg, lower back.  Denies headaches. Function rated 7.   Last visit pain rated 7/10.  States neuropathy in lower extremities is worse.  Radiation of pain: left sided leg pain ongoing and chronic to left posterior calf, intense paresthesias  Paresthesias, numbness, weakness: hand numbness.    Gait disturbance: occasionally uses a cane, denies recent falls.   GI/: No constipation or urinary or stool incontinence  Exacerbating factors: Stress, activity including walking or standing too long  Relieving factors: Pain improves with rest, medications, elevation of feet, lidoderm patches.  Adverse effects of medications: Nausea, vomiting intermittently. Takes Phenergan and compazine.  Constipation uses senna or Miralax but trying not to take every day, BM every 2-3 days.  Current treatment efficacy: Good.  States she is taking methadone 5 mg every 6 hours.  Endocet works well for breakthrough pain, hasn't taken since surgery as she was prescribed oxycodone 5 mg (see below).  Current treatment compliance: Fair.  Taking medication as prescribed and keeps follow-up appointments.  No current PT,  "injections, BH.      Reports N/V for past 2 weeks.  She reports diarrhea 3-6 episodes per day.  She denies fever.  She is going to call to see her primary.  She denies dizziness, shortness of breath.  Feels lousy.    She is drinking water and gatorade daily to try to keep electrolytes stable.  She is eating a bland diet.  Compazine works best but knocks her out and wants to sleep all day.  She takes phenergan 25 mg if needing to leave the house.    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 Gresham and Dr. Girard. 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  Postoperative visit with Angelika Sutton CNP on 05/22/17.  Saw Dr. Girard ENT as she reports she has a large hole in her septum.  She states she didn't want any more surgeries to repair.  She did have 2 sinus infections in a period of 4 weeks, off antibiotics now.  She states if she blows her nose she still has green sticky mucus and coughs up yellow phlegm in the morning.  She was supposed to have MR on 06/20/17 but presented with nausea, vomiting, anxiety and was unable to complete as sedation was not ordered.  She isn't sure she is going to reschedule.  She is disappointed that her nausea, vomiting continues despite this surgery.    She states she would like to reduce her methadone, but does not feel well enough to do so yet.  She continues 5 mg every 6 hours prn, denies side effects.  She states her neuropathy is not as well controlled and describes legs as creepy, crawling, painful. She denies hand pain.  She is only taking Endocet sparingly, not daily.    Review of Systems  A 12 point ROS was completed and was negative except for above listed and ongoing nausea, intermittent vomiting.  Weight stable, denies fever.  Sleep interrupted due to stress and pain.       Objective:     Vitals:    07/13/17 1524   BP: 95/65   Pulse: 76   Resp: 16   Weight: 213 lb (96.6 kg)   Height: 5' 5\" " (1.651 m)   PainSc:   8     Physical Exam  General- patient is alert and oriented, in NAD, well-groomed, well-nourished.  Appears ill, fatigued, Obese. Presents alone today.  Psych- Judgment and insight normal, AOx4, speech and thought process normal, recent and remote memory normal, mood and affect concerned but pleasant.  Respiratory- breathing is non-labored, normal rhythm and rate.  Cardiovascular- extremities warm and well perfused, no peripheral edema or varicosities  Spine- normal gross alignment, no focal tenderness on palpation, no spasms  Musculoskeletal- Able to sit to stand with difficulty.  Ambulates with antalgia independently.    *Opioid [place] Precautions:    UDS/Swab - 09/27/16 appropriate  Opioid Consent -  due  Opioid Agreement - 03/23/17  Pharmacy- as documented    [place]  Reviewed - 07/13/17 as expected.  Pill Count - 10/17/16 appropriate  Psychological evaluation - n/a  MME - up to 75  Pharmacogenetic testing - n/a  EKG 04/03/17 Normal sinus rhythm QT/QTc 378/413 ms     Assessment:   Citlalli Noonan is a 71 y.o. female seen in clinic today for lower extremity pain secondary to diabetic peripheral neuropathy and lumbar pain due to degeneration and left leg radiculopathy treated with methadone.  She is reporting increased N/V and also increased difficulty treating neuropathy.  Unclear if this is due to worsening symptoms, stress with recent surgery/illness, slight withdrawal from vomiting some doses of medications, or a combination.  I do not advise increased her methadone nor does she want to; she reports she is unable to reduce dose at this time although we have discussed returning to every 8 hour dosing.  She is educated on neuropathic supplements today including CBD oils to see if this is assistive.  She states she cannot afford the medical cannabis program at this time.    Plan:   Continue methadone to 5 mg every 6 hours.    As your opioid dose remains stable, I will send electronic  refills to the pharmacy for 2 subsequent months until your next appointment so you do not have to call our refill line.  The fill dates for your medications are:  07/18/17 & 08/15/17  Check with your pharmacy that all prescriptions are on your profile. Call the pharmacy a week before you want to fill the prescription as stock may vary and the pharmacy may need to order the medication for you. I reserve the right to cancel the electronic prescription at the pharmacy in between appointments and would contact you with an explanation if this were to occur.  Continue Endocet 10/325 mg 1-2 daily as needed for severe pain - no refill needed today  PEA - Palmitoylethanolamide for nerve pain  Obtain at website [email or web address] for $30  Take 350 mg 1 capsule twice a day.  Will take at least a month to evaluate full benefits of supplement  Also consider CBD products for nerve pain - information given to read about CBD oil.  This is legal as it does not have any THC component; discuss [employer] in [place] is one place to order from.  Prices vary.  See primary doctor related to prolonged nausea, diarrhea to check labs.  Follow-up in 8 weeks with Shannan Gagnon PA-C  [facility]  [address]  [place] [address]  Ph: [phone]  Fax: [phone]

## 2021-06-11 NOTE — PROGRESS NOTES
PAIN CLINIC PROGRESS NOTE    Subjective:   Citlalli Noonan is a 74 y.o. female who presents for evaluation of low back pain.  She has lumbar degeneration, OA bilateral knees, and diabetic peripheral neuropathy.  She has continued low dose methadone as this helps pain but does have intermittent nausea/vomiting as a side effect.  Weaning off medication caused uncontrolled pain and poor sleep and trial of other extended release opioids did no help reduce neuropathic pain.  She was also to continue Endocet, topicals, and muscle relaxants.  She has been educated on lumbar sympathetic block for lower extremity neuropathy.    Major issues:  1. Chronic, continuous use of opioids    2. Chronic pain syndrome    3. Lumbar Disc Degeneration    4. Lumbar radicular pain    5. Diabetic peripheral neuropathy (H)       Pain Ratin/10 constant aching sharp at bedtime pain in left leg to knee/ankle/foot, arm, lower back.  Function rated 4.     Radiation of pain: left sided leg pain ongoing and chronic to left posterior calf  Gait disturbance: occasionally uses a cane, denies recent falls.   Exacerbating factors: Stress, activity including walking or standing too long, moving, sleeping, hot weather  Relieving factors: medications, walking, rest.  Associated Symptoms: Numbness in bilateral feet/legs (neuropathy) and left hip, night pain. Denies fever/chills, weakness, bowel or bladder incontinence.   Functional Symptoms: See CMA notes  Adverse effects of medications: Nausea, vomiting intermittently 2-3 times per week. Takes zofran prn.  Constipation uses senna or ducolax suppository or Miralax prn.  Uses Activia yogurt.    Current treatment efficacy: Good.  Methadone 15 mg in am, 5 mg in pm, and 10 mg at bedtime.  Endocet 10/325 mg works well for breakthrough pain, doesn't take daily but useful at night if pain wakes her up she takes in evening and helps all night.   Current treatment compliance: Good. Keeping follow-up  appointments, taking medication as prescribed. Recently completed PT for cervical spine.  Exercising at home with light weights and walking.      Since last seen, she denies any new medical conditions, ED/hospital visit, new diagnoses or medications.  Denies recent falls or injuries.      Order for lumbar CT last visit due to increased back and leg symptoms and inability to tolerate MRI scans due to severe claustrophobia even with open and medication attempts.  She hasn't been able to do this yet due to other priorities - states a friend in her building has 2 types of cancer and also her niece is continuing to fight ovarian cancer. She also is helping a cousin with dementia; states with all their appointments and needs it is difficult to find time for her own.      She states nerve pain is most severe at night. She states it goes down her left leg to her foot.  She states during the day if she is up moving and walking she doesn't feel as much.  She would like to trial medical cannabis but cannot due to out of pocket cost.  She has tried gabapentin, amitriptyline, lyrica with side effects.  She denies other neuropathic medication trials including duloxetine, off label amantadine or namenda.  She has not taken PEA or CBD oil.   She stopped topamax 50 mg at night as it was too sedating into the next day, does not want to restart at 25 mg at this time.  She did find out that acupuncture is covered by insurance this year, interested in this but hasn't started due to COVID.  She did recently purchase a chair that reclines into a bed from [employer] that she is excited about to see if she can sleep better.  She lays on her left side which is painful.  She has daytime fatigue and states she gets 5 hours of sleep per night and falls asleep in her chair during the day if she is not active.  She has DUNIA and doesn't use CPAP as she was having difficulty keeping it clean and was getting sinus infections.  Plans to return to  PCP to discuss.    Recommended trial of duloxetine 30 mg every day x 7 days then 60 mg daily and she reports she hasn't started yet. She has filled it and plans to start when she has more time at home incase of side effects.  Denies questions about it.    Review of Systems  A 12 point ROS was completed and was negative except for above listed and ongoing nausea, intermittent vomiting.  Weight stable, denies fever.  Sleep interrupted due to stress and pain.        Objective:     Vitals:    09/16/20 1112   BP: 144/70   Pulse: 68   Resp: 18   Temp: 98.3  F (36.8  C)       Physical Exam  Constitutional- General appearance: Normal.  Well developed, comfortable, obese, and appearance reflects stated age.  No acute distress or pain behaviors noted.  Presents alone today.  Psychiatric- Judgment and insight: Normal.  Speech: Normal rhythm.  Thought process: Normal.  No abnormal thoughts reported. Alert & Oriented to person, place, and time.  Recent and remote memory: Normal.  Observed mood: concerned  Respiratory- Breathing is non-labored; normal rhythm and rate.  Dermatologic- Exposed skin is clean, dry, and intact to inspection.  Musculoskeletal- Gait and station: ambulates independently with antalgia.    *Opioid [place] Precautions:    UDT/Blood - 01/09/20    Opioid Consent - 09/16/20  Opioid Agreement - 09/16/20  Pharmacy- as documented    [place]  Reviewed - 09/16/2020 as expected.  Pill Count - 01/15/20 appropriate  Psychological evaluation - n/a  MME - 90, [facility] adjusted MME is 240  Pharmacogenetic testing - n/a  EKG 10/01/19, NSR and QT/QTc 400/419 ms  FAUSTINO 01/09/2020    Imaging:  CT Head Brain without Contrast 01/2/2020  IMPRESSION:  1.  Unchanged postoperative appearance when compared to 08/28/2018. No new acute  intracranial abnormality.     2.  Unchanged small amount of enhancing tissue in the right aspect of the sella  which may represent normal residual pituitary tissue. No convincing evidence  of  residual/recurrent tumor.    EXAM: XR HIP 2 OR 3 VIEWS W PELVIS LEFT  LOCATION: [facility]  DATE/TIME: 12/23/2019 11:54 AM    INDICATION: Osteoarthritis Of Spine With Radiculopathy, Lumbar Region  COMPARISON: 08/15/2017    Findings:  The symphysis pubis and pubic rami are normal. The hips demonstrate a mild  degree of symmetric degenerative change characterized by central loss of  articular cartilage, minor marginal spurring and minimal sclerosis. Targeted  attention of the left hip is negative for fracture. There is a 2 cm focus of  circumscribed calcification in the subcutaneous soft tissues of the  anterolateral aspect of the left proximal thigh. These findings are stable  dating back to 2017 and likely represent an area of old osteoporosis. The sacral  wings, sacroiliac joints and iliac wings are negative.    IMPRESSION: Negative for acute bony abnormality. Mild symmetric degenerative  changes in both hips.      Assessment:   Citlalli Noonan is a 74 y.o. female seen in clinic today for lower extremity pain secondary to diabetic peripheral neuropathy, cervical pain with CT scan demonstrating multiple levels of moderate stenosis and degeneration, lumbar pain due to degeneration and left leg radiculopathy treated with methadone.  She is having updated lumbar CT pending and possible spine surgery consult.  She reports increased lumbar pain with left leg radiculopathy in L5 distribution; reports she cannot do an MRI due to claustrophobia even with medications unable to tolerate and have ordered lumbar CT for assessment.  This is pending and she is encouraged to complete; she denies any further episodes of bowel incontinence.  Discuss failure of gabapentin, amitriptyline and lyrica, and intolerance to 50 mg topamax (may consider 25 mg trial in the future until she can tolerate higher dose).  Recommend a trial of duloxetine for chronic pain; she is educated on this medication and possible  risks/side effects.  She is also educated on risk with respiratory depression on opioids with untreated sleep apnea; she plans to see PCP to re-evaluate for CPAP.    Plan:   Continue methadone 15 mg every 12 hours.  Check with your pharmacy that all prescriptions are on your profile. Call the pharmacy a week before you want to fill the prescription as stock may vary and the pharmacy may need to order the medication for you. I reserve the right to cancel the electronic prescription at the pharmacy in between appointments and would contact you with an explanation if this were to occur.  Refills sent for 09/22/20 & 10/20/20  Continue Endocet 10/325 mg 1-2 daily as needed for severe or breakthrough pain.  Refill not needed today.  Can take Ibuprofen 800 mg 1 tab up to 3 times a day as needed for pain with food.    Discussed scheduling lumbar CT scan to evaluate pain in left leg and lower back - patient will through [facility].  Duloxetine trial discussed - not started yet, patient has 30 mg when ready to take 1 daily x 7 days, then increase to 60 mg daily (may take 2 together or take 1 in am and 1 in pm).  Discussed this will take up to 4 weeks to see benefits for pain. Call nurse line with any concerns once started.  Opioid agreement and consent form signed today.  Discussed bringing these copies and your most recent After Visit Summary (AVS) from our appointment to the pharmacy when you are refilling controlled medications to assist with any additional information the pharmacist may require.   Follow-up in 8 weeks with Shannan Gagnon PA-C  [facility]  [address]  [place] [address]  Ph: [phone]  Fax: [phone]

## 2021-06-11 NOTE — PATIENT INSTRUCTIONS - HE
Continue methadone 15 mg every 12 hours.  Check with your pharmacy that all prescriptions are on your profile. Call the pharmacy a week before you want to fill the prescription as stock may vary and the pharmacy may need to order the medication for you. I reserve the right to cancel the electronic prescription at the pharmacy in between appointments and would contact you with an explanation if this were to occur.  Refills sent for 09/22/20 & 10/20/20  Continue Endocet 10/325 mg 1-2 daily as needed for severe or breakthrough pain.  Refill not needed today.  Can take Ibuprofen 800 mg 1 tab up to 3 times a day as needed for pain with food.    Discussed scheduling lumbar CT scan to evaluate pain in left leg and lower back - patient will through [facility].  Duloxetine trial discussed - not started yet, patient has 30 mg when ready to take 1 daily x 7 days, then increase to 60 mg daily (may take 2 together or take 1 in am and 1 in pm).  Discussed this will take up to 4 weeks to see benefits for pain. Call nurse line with any concerns once started.  Opioid agreement and consent form signed today.  Discussed bringing these copies and your most recent After Visit Summary (AVS) from our appointment to the pharmacy when you are refilling controlled medications to assist with any additional information the pharmacist may require.   Follow-up in 8 weeks with Shannan

## 2021-06-11 NOTE — SEDATION DOCUMENTATION
Pt was not able to due MRI due to being nervous. Reassurance given to pt.  I offered to stay in the room with her while I medicated.  She declines.

## 2021-06-11 NOTE — PROGRESS NOTES
Patient presents to the clinic today for a follow up with Shannan Gagnon PA-C regarding multi site pain.    Pain score: 8  Constant   What does your pain feel like: ache, sharp at bedtime  Does the pain interfere with:  Work -----no  Walking ------yes  Sleep -------yes  Daily activities -----yes  Relationships -------yes  F= 4

## 2021-06-12 NOTE — PROGRESS NOTES
PAIN CLINIC PROGRESS NOTE    Subjective:   Citlalli Noonan is a 72 y.o. female who presents for evaluation of low back pain.  She has lumbar degeneration, OA bilateral knees, and diabetic peripheral neuropathy.  She has continued low dose methadone as this helps pain but does have intermittent nausea/vomiting as a side effect.  Weaning off medication caused uncontrolled pain and poor sleep and trial of other extended release opioids did no help reduce neuropathic pain.  She was also to continue Endocet, topicals, and muscle relaxants.  She has been educated on lumbar sympathetic block for lower extremity neuropathy.    Major issues:  1. Chronic pain syndrome    2. Opioid Dependence With Continuous Use    3. Diabetic peripheral neuropathy    4. Lumbar Disc Degeneration       Pain Ratin/10 constant aching pain left shoulder, arm, leg, lower back.  Denies headaches. Function rated 7.   Last visit pain rated 8/10.  States neuropathy in lower extremities is worse.  Radiation of pain: left sided leg pain ongoing and chronic to left posterior calf, intense paresthesias  Paresthesias, numbness, weakness: hand numbness.    Gait disturbance: occasionally uses a cane, denies recent falls.   GI/: No constipation or urinary or stool incontinence  Exacerbating factors: Stress, activity including walking or standing too long  Relieving factors: Pain improves with rest, medications, elevation of feet, lidoderm patches.  Adverse effects of medications: Nausea, vomiting intermittently. Takes Phenergan and compazine.  Constipation uses senna or Miralax but trying not to take every day, BM every 2-3 days.  Current treatment efficacy: Fair.  States she is taking methadone 5 mg every 6 hours, doesn't feel it is as effective (see below).  Endocet 10/325 mg works well for breakthrough pain, doesn't take daily.  Current treatment compliance: Fair.  Taking medication as prescribed and keeps follow-up appointments.  No current PT,  "injections, BH.      She states she had a birthday this weekend and played [employer] 12 hours; went home in a lot of pain but had a good time.  She states she is having ultrasound for her legs as she is having right great toe numbness, sometimes painful like if she hit the toe and feels swollen but there are no visible changes.  She is going to see vascular to rule out PAD.  Likely neuropathy.   She states her methadone is not working as well for her pain, she is wondering if the  is changed.  She states it previously worked better when she was taking 5 mg every 8 hours and since increasing to every 6 hours she is getting less benefit and wonders if there is a way to \"test\" the tablet to make sure she is getting the same as before.  We discuss the concept of tolerance to opioids.  She did not obtain the supplements of PEA or CBD which we discussed last visit.    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 Gresham and Dr. Girard. 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  Postoperative visit with Angelika Sutton CNP on 05/22/17.  Saw Dr. Girard ENT as she reports she has a large hole in her septum.  She states she didn't want any more surgeries to repair.  She was supposed to have MR on 06/20/17 but presented with nausea, vomiting, anxiety and was unable to complete as sedation was not ordered.  She isn't sure she is going to reschedule.  She is disappointed that her nausea, vomiting continues despite this surgery.  She has scheduled follow-up 09/08 with brain scan as she is feeling swelling in her right parietal region.      Review of Systems  A 12 point ROS was completed and was negative except for above listed and ongoing nausea, intermittent vomiting.  Weight stable, denies fever.  Sleep interrupted due to stress and pain.       Objective:     Vitals:    09/05/17 1510   BP: 105/59   Pulse: 65   Resp: 16   Weight: 213 lb (96.6 kg) " "  Height: 5' 5\" (1.651 m)   PainSc:   8     Physical Exam  General- patient is alert and oriented, in NAD, well-groomed, well-nourished.  Appears comfortable, Obese. Presents alone today.  Psych- Judgment and insight normal, AOx4, speech and thought process normal, recent and remote memory normal, mood and affect concerned but pleasant.  Respiratory- breathing is non-labored, normal rhythm and rate.  Cardiovascular- extremities warm and well perfused, no peripheral edema or varicosities  Spine- normal gross alignment, no focal tenderness on palpation, no spasms  Musculoskeletal- Able to sit to stand with difficulty.  Ambulates with antalgia independently.    *Opioid [place] Precautions:    UDS/Swab - 09/27/16 appropriate  Opioid Consent -  due  Opioid Agreement - 03/23/17  Pharmacy- as documented    [place]  Reviewed - 09/05/17 as expected.  Pill Count - 10/17/16 appropriate  Psychological evaluation - n/a  MME - up to 75  Pharmacogenetic testing - n/a  EKG 04/03/17 Normal sinus rhythm QT/QTc 378/413 ms     Assessment:   Citlalli Noonan is a 72 y.o. female seen in clinic today for lower extremity pain secondary to diabetic peripheral neuropathy and lumbar pain due to degeneration and left leg radiculopathy treated with methadone.  She is reporting increased N/V and also increased difficulty treating neuropathy.  She states her PMD ordered a ultrasound with ABIs to rule out PAD.  I do not advise increased her methadone nor does she want to; she reports she is unable to reduce dose at this time although we have discussed returning to every 8 hour dosing. We discuss the concept of tolerance today which she understands.  She is educated on neuropathic medications, she has previously failed Gabapentin due to negative effects on blood sugars and Amitriptyline as it wasn't effective and she is recommended to start Lyrica for diabetic peripheral neuropathy. She is educated on possible risks and side effects and is " concerned about potential for vision change as she has glaucoma.  I review the product information today and this incidence is about 7% for blurry/double vision on Lyrica, but I did not see any statement regarding worsening glaucoma.  We have also discussed supplements today including CBD oils to see if this is assistive.  She states she cannot afford the medical cannabis program at this time.       Plan:   Continue methadone  5 mg every 6 hours.  You may check with pharmacist to see if manufacturing change in medication refills.  Also we discuss the concept of tolerance with opioids today if it isn't helping as much.  Refill sent for 09/12/17  Continue Endocet 10/325 mg 1-2 daily as needed for severe pain - no refill needed today  Schedule your ultrasound to rule out vascular disease.  Discussed for neuropathy starting Lyrica 75 mg at night x 3 days, then increase to 75 mg twice a day.  Could continue to increase as needed.  Discussed possible side effects today, will double check on risk with glaucoma but could only find incidence of blurry/double vision increased about 7% on medication.  You can also ask your eye doctor.    Follow-up in 4 weeks with Shannan Gagnon PA-C  [facility]  [address]  [place] [address]  Ph: [phone]  Fax: [phone]

## 2021-06-13 NOTE — PROGRESS NOTES
PAIN CLINIC PROGRESS NOTE    Subjective:   Citlalli Noonan is a 72 y.o. female who presents for evaluation of low back pain.  She has lumbar degeneration, OA bilateral knees, and diabetic peripheral neuropathy.  She has continued low dose methadone as this helps pain but does have intermittent nausea/vomiting as a side effect.  Weaning off medication caused uncontrolled pain and poor sleep and trial of other extended release opioids did no help reduce neuropathic pain.  She was also to continue Endocet, topicals, and muscle relaxants.  She has been educated on lumbar sympathetic block for lower extremity neuropathy.    Major issues:  1. Chronic pain syndrome    2. Diabetic peripheral neuropathy    3. Opioid Dependence With Continuous Use    4. Lumbar Disc Degeneration       Pain Ratin/10 constant aching hurting pain lower back, bilateral feet.  Function rated 7.   Last visit pain rated 8/10.  States neuropathy in lower extremities is worse.  Radiation of pain: left sided leg pain ongoing and chronic to left posterior calf, intense paresthesias  Paresthesias, numbness, weakness: hand numbness.    Gait disturbance: occasionally uses a cane, denies recent falls.   GI/: No constipation or urinary or stool incontinence  Exacerbating factors: Stress, activity including walking or standing too long  Relieving factors: Pain improves with rest, medications, elevation of feet, lidoderm patches.  Adverse effects of medications: Nausea, vomiting intermittently. Takes Phenergan and compazine.  Constipation uses senna or Miralax but trying not to take every day, BM every 2-3 days.  Current treatment efficacy: Fair.  States she is taking methadone 5 mg every 6 hours, doesn't feel it is as effective (see below).  Endocet 10/325 mg works well for breakthrough pain, doesn't take daily.  Current treatment compliance: Fair. Missed her last appointment which delayed her methadone refill, see below. No current PT, injections, BH.   "    She missed her appointment on 10/03/17 as she just forgot and states she had it written down on 2 calendars.  She states 3 days without methadone and her \"legs were flying all over\" and withdrawal symptoms always sweating and her feet were very painful.  She states she could hardly walk without methadone.  She states she was taking her Endocet 10/325 mg during this time which was helpful.  Slept 2 hours each night until she got her prescription restarted by Dr. Kelley on 10/16/17. She states she has #8 tabs of Endocet left and doesn't want a refill yet.  She reports her pain symptoms are at baseline.  She asks about increasing her methadone dose by another tablet for better pain control.     She was educated and recommended on Lyrica trial last visit and had some reservations regarding her vision and starting it.  She states she left a message with her opthalmologist and she never heard back.  She reports she woke today with blurry vision left eye and is somewhat better but plans to see eye doctor if it does not improve.  She also has been educated on CBD/hemp oil for pain and did not obtain it.    She denies new pain symptoms or changes in health or medications since last seen.    Review of Systems  A 12 point ROS was completed and was negative except for above listed and ongoing nausea, intermittent vomiting.  Weight stable, denies fever.  Sleep interrupted due to stress and pain.       Objective:     Vitals:    10/19/17 1358   BP: 113/68   Pulse: 65   Resp: 16   Weight: 213 lb (96.6 kg)   Height: 5' 5\" (1.651 m)   PainSc:   7     Physical Exam  General- patient is alert and oriented, in NAD, well-groomed, well-nourished.  Appears comfortable, Obese. Presents alone today.  Psych- Judgment and insight normal, AOx4, speech and thought process normal, recent and remote memory normal, mood and affect concerned but pleasant.  Respiratory- breathing is non-labored, normal rhythm and rate.  Cardiovascular- " extremities warm and well perfused, no peripheral edema or varicosities  Spine- normal gross alignment, no focal tenderness on palpation, no spasms  Musculoskeletal- Able to sit to stand with difficulty.  Ambulates with antalgia independently.    *Opioid [place] Precautions:    UDS/Swab - collected 10/19/17, results pending  Opioid Consent -  due  Opioid Agreement - 03/23/17  Pharmacy- as documented    [place]  Reviewed - 10/19/17 as expected.  Pill Count - 10/17/16 appropriate  Psychological evaluation - n/a  MME - up to 75  Pharmacogenetic testing - n/a  EKG 04/03/17 Normal sinus rhythm QT/QTc 378/413 ms     Assessment:   Citlalli Noonan is a 72 y.o. female seen in clinic today for lower extremity pain secondary to diabetic peripheral neuropathy and lumbar pain due to degeneration and left leg radiculopathy treated with methadone.  She is reporting increased neuropathy pain in lower extremities.  I do not advise to increase her methadone per CDC guidelines and also we have discussed the concept of tolerance on opioids.  She is educated on neuropathic medications, she has previously failed Gabapentin due to negative effects on blood sugars and Amitriptyline as it wasn't effective and she is recommended to start Lyrica for diabetic peripheral neuropathy. She is educated on possible risks and side effects and is concerned about potential for vision change as she has glaucoma.  I review the product information today and this incidence is about 7% for blurry/double vision on Lyrica, but I did not see any statement regarding worsening glaucoma.  She will also review this with her opthalmologist. We have also discussed supplements today including CBD oils to see if this is assistive.  She states she cannot afford the medical cannabis program at this time.       Plan:   Continue methadone 5 mg every 6 hours.  Refill was done 10/16/17 for 28 days, next fill date is 11/13/17 sent today.  Continue Endocet 10/325 mg 1-2  daily as needed for severe or breakthrough pain - no refill needed today.   Discussed for neuropathy starting Lyrica 75 mg at night x 3 days, then increase to 75 mg twice a day.  Could continue to increase as needed.  Discussed possible side effects today, patient will discuss also with eye doctor.  Also recommend trial of CBD/hemp oil for neuropathy, either capsule or liquid form. You can obtain a health store or online at different sites including [facility].    Diagnostics: UDT/SWAB collected today results are pending.  UDT/SWAB:  Patient required a random Urine Drug Testing, due to the need to comply with [place] Model Policy Guidelines and CDC Guideline for the use of any controlled substances. This is to ensure that patient is compliant with treatment, and monitor for risks such as diversion, abuse, or any other aberrant behaviors. Patient is either being considered for or taking a controlled substance. Unexpected findings will be discussed and treatment decision may be adjusted. Testing is being implemented across the board randomly w/o bias related to age, race, gender, socioeconomic status or Jew affiliation.   Follow-up in 7 weeks with Shannan Gagnon PA-C  [facility]  [address]  [place] [address]  Ph: [phone]  Fax: [phone]

## 2021-06-13 NOTE — TELEPHONE ENCOUNTER
12/1/2020    Call Regarding Annual Wellness       Attempt 2    Message on voicemail    Comments:       Outreach   PETTY

## 2021-06-13 NOTE — PROGRESS NOTES
Patient was given the option for a video visit; however, he/she does not have devices and availability of technology or internet service to make this possible. As an alternative, and in order to keep the patient current with medication(s) and pain care, he/she was offered a telephone visit today.     PAIN CLINIC PROGRESS NOTE    Subjective:   Citlalli Noonan is a 74 y.o. female who presents for evaluation of low back pain.  She has lumbar degeneration, OA bilateral knees, and diabetic peripheral neuropathy.  She has continued low dose methadone as this helps pain but does have intermittent nausea/vomiting as a side effect.  Weaning off medication caused uncontrolled pain and poor sleep and trial of other extended release opioids did no help reduce neuropathic pain.  She was also to continue Endocet, topicals, and muscle relaxants.  She has been educated on lumbar sympathetic block for lower extremity neuropathy.    Major issues:  1. Chronic, continuous use of opioids    2. Chronic pain syndrome    3. Lumbar Disc Degeneration    4. Lumbar radicular pain    5. Diabetic peripheral neuropathy (H)       Pain Ratin/10 constant aching sharp at bedtime pain in left leg to knee/ankle/foot, arm, lower back.  Function rated 7.     Radiation of pain: left sided leg pain ongoing and chronic to left posterior calf  Gait disturbance: occasionally uses a cane, denies recent falls.   Exacerbating factors: Stress, activity including walking or standing too long, moving, sleeping, hot weather  Relieving factors: medications, walking, rest.  Associated Symptoms: Numbness in bilateral feet/legs (neuropathy) and left hip, night pain. Denies fever/chills, weakness, bowel or bladder incontinence.   Functional Symptoms: See CMA notes  Adverse effects of medications: Nausea, vomiting intermittently 2-3 times per week. Takes zofran prn.  Constipation uses senna or ducolax suppository or Miralax prn.  Uses Activia yogurt.    Current  "treatment efficacy: Good.  Methadone 15 mg two times a day.  Endocet 10/325 mg works well for breakthrough pain, doesn't take daily but useful at night if pain wakes her up she takes in evening and helps all night.   Current treatment compliance: Good. Keeping follow-up appointments, taking medication as prescribed. Recently completed PT for cervical spine.  Exercising at home with light weights and walking.      Since last seen, she denies any new medical conditions, ED/hospital visit, new diagnoses or medications.  Denies recent falls or injuries.   Requested to change her visit to virtual today as she does have acute illness with diarrhea, vomiting, temp 99.4. She is symptomatic since last Friday and able to eat bland toast, cereal, clear fluids.  She is staying hydrated.  She only threw up methadone once and has been able to take her doses consistently.  She states her illness makes her pain feel worse and her lower back is \"killing her\" which is due to prolonged sitting on the toilet.    She denies falls or injuries, denies new areas of pain.  Legs fall asleep if sitting too long and more painful.  She is using her recliner chair during the day and resting more.  Has taken Endocet 5 tabs in the past week as she is taking around noon.     She had annual visit on 11/02/20:  ASSESSMENT/PLAN:  ICD-10-CM   1. Medicare annual wellness visit, subsequent Z00.00   2. Encounter for Medicare annual wellness exam Z00.00   3. Need for zoster vaccination Z23   4. Breast cancer screening by mammogram Z12.31 XR MAMMO GREG BILAT SCREEN [[id number]]   5. Type 2 diabetes mellitus with diabetic polyneuropathy, with long-term current use of insulin (HC) E11.42   Z79.4   6. Stage 3a chronic kidney disease N18.31   7. Uncontrolled type 2 diabetes mellitus with hyperglycemia (HC) E11.65   8. Essential hypertension I10   9. Partial hypopituitarism (HC) E23.0 XR DXA BONE DENSITY 2 SITES AXIAL   10. Colloid thyroid nodule E04.1   11. " "Obstructive sleep apnea G47.33   12. Hyperlipidemia E78.2   13. Acquired hypothyroidism E03.9   14. Fatty liver K76.0   15. Opioid type dependence, continuous (HC) F11.20   16. Visit for screening mammogram Z12.31 XR MAMMO BILAT SCREENING [[id number]]   17. Well adult exam Z00.00   18. Paroxysmal hemicrania G44.039   19. Thinning hair L65.9   20. Kidney Stones - Left N20.0     MsMary Kay Noonan's Body mass index is 37.35 kg/m . This is out of the normal range for a 75 y.o. Normal range for ages 18+ is between 18.5 and 24.9. To lose weight we reviewed risks and benefits of appropriate options such as diet, exercise, and medications. Patient's strategy will be self-directed nutrition plan and self-directed exercise program     Advance Care Planning   Advance Care Planning discussed: yes   Advanced Care Plan/Legal Documents   Document Type Scanned On Description   Healthcare Directive 05/10/13 health care directive     Set up appointments with Dr. Parmar, dermatology, radiology for the roscoe and bone density tests. Continued follow up with the pain clinic and neurology.\"      She is scheduled at [facility] tomorrow for consult and unsure if she can make it.  She will see them for headaches.     Order for lumbar CT last visit due to increased back and leg symptoms and inability to tolerate MRI scans due to severe claustrophobia even with open and medication attempts. She did complete and we reviewed today (see below).     She states nerve pain is most severe at night. She states it goes down her left leg to her foot.  She states during the day if she is up moving and walking she doesn't feel as much.  She would like to trial medical cannabis but cannot due to out of pocket cost.  She has tried gabapentin, amitriptyline, lyrica with side effects.  She denies other neuropathic medication trials including duloxetine, off label amantadine or namenda.  She has not taken PEA or CBD oil.   She stopped topamax 50 mg at night as it was too " sedating into the next day, does not want to restart at 25 mg at this time.  She did find out that acupuncture is covered by insurance this year, interested in this but hasn't started due to COVID.      Recommended trial of duloxetine 30 mg every day x 7 days then 60 mg daily and she reports she hasn't started yet. She has filled it and plans to start when her current illness resolves.  Denies questions about it.    Cousin has dementia and states she has been working with her doctor about plan of care. She states her niece had 2 toes amputated recently and helping her recover.  Feels she often has little time to care for her own health needs/appointments due to her family needs.    Review of Systems  A 12 point ROS was completed and was negative except for above listed and ongoing nausea, intermittent vomiting.  Weight stable, denies fever.  Sleep interrupted due to stress and pain.      *Opioid Universal Precautions:    UDT/Blood - 01/09/20    Opioid Consent - 09/16/20  Opioid Agreement - 09/16/20  Pharmacy- as documented    [place]  Reviewed - 11/11/20 as expected.  Pill Count - 01/15/20 appropriate  Psychological evaluation - n/a  MME - 90, CDC adjusted MME is 240  Pharmacogenetic testing - n/a  EKG 10/01/19, NSR and QT/QTc 400/419 ms  FAUSTINO 01/09/2020    Imaging:  CT Head Brain without Contrast 01/2/2020  IMPRESSION:  1.  Unchanged postoperative appearance when compared to 08/28/2018. No new acute  intracranial abnormality.     2.  Unchanged small amount of enhancing tissue in the right aspect of the sella  which may represent normal residual pituitary tissue. No convincing evidence of  residual/recurrent tumor.    EXAM: XR HIP 2 OR 3 VIEWS W PELVIS LEFT  LOCATION: [facility]  DATE/TIME: 12/23/2019 11:54 AM    INDICATION: Osteoarthritis Of Spine With Radiculopathy, Lumbar Region  COMPARISON: 08/15/2017    Findings:  The symphysis pubis and pubic rami are normal. The hips demonstrate a  mild  degree of symmetric degenerative change characterized by central loss of  articular cartilage, minor marginal spurring and minimal sclerosis. Targeted  attention of the left hip is negative for fracture. There is a 2 cm focus of  circumscribed calcification in the subcutaneous soft tissues of the  anterolateral aspect of the left proximal thigh. These findings are stable  dating back to 2017 and likely represent an area of old osteoporosis. The sacral  wings, sacroiliac joints and iliac wings are negative.    IMPRESSION: Negative for acute bony abnormality. Mild symmetric degenerative  changes in both hips.     CT Spine Lumbar without Contrast 10/19/20  IMPRESSION:    1.  Postsurgical changes right L5 hemilaminectomy.  2.  Sclerotic lesion within the left aspect of the sacrum, relatively stable  since 05/03/2010, which most consistent with a bone island.  3.  No evidence of acute fracture or subluxation of the lumbar spine by CT  imaging.  4.  Lumbar spondylosis with level by level analysis as described above    Assessment:   Citlalli Noonan is a 74 y.o. female with visit today for lower extremity pain secondary to diabetic peripheral neuropathy, cervical pain with CT scan demonstrating multiple levels of moderate stenosis and degeneration, lumbar pain due to degeneration and left leg radiculopathy treated with methadone.  She had updated lumbar CT and we will review lumbar facet injection treatment when she is feeling well.  For neuropathic pain, discuss failure of gabapentin, amitriptyline and lyrica, and intolerance to 50 mg topamax (may consider 25 mg trial in the future until she can tolerate higher dose).  Recommend a trial of duloxetine for chronic pain; she is educated on this medication and possible risks/side effects.  She is also educated on risk with respiratory depression on opioids with untreated sleep apnea; she plans to see PCP to re-evaluate for CPAP.    Plan:   Continue methadone 15 mg every  12 hours.  Check with your pharmacy that all prescriptions are on your profile. Call the pharmacy a week before you want to fill the prescription as stock may vary and the pharmacy may need to order the medication for you. I reserve the right to cancel the electronic prescription at the pharmacy in between appointments and would contact you with an explanation if this were to occur.  Refills sent for 11/17/20 & 12/15/20  Continue Endocet 10/325 mg 1-2 daily as needed for severe or breakthrough pain.  Refill not needed today.  Duloxetine trial discussed - not started yet, patient has 30 mg when ready to take 1 daily x 7 days, then increase to 60 mg daily (may take 2 together or take 1 in am and 1 in pm).  Discussed this will take up to 4 weeks to see benefits for pain. Call nurse line with any concerns once started.  Reviewed CT scan - will discuss options for lumbar procedures at future visit  Follow-up in 10-12 weeks with Shannan    Call Time: 20 minutes   Start - 1110  Stop - 1130    Shannan Gagnon PA-C  [facility]  [address]  [place] [address]  Ph: [phone]  Fax: [phone]

## 2021-06-14 NOTE — PATIENT INSTRUCTIONS - HE
Continue methadone 15 mg every 12 hours.  Check with your pharmacy that all prescriptions are on your profile. Call the pharmacy a week before you want to fill the prescription as stock may vary and the pharmacy may need to order the medication for you. I reserve the right to cancel the electronic prescription at the pharmacy in between appointments and would contact you with an explanation if this were to occur.  Refills sent for 01/21/2021 & 02/18/2021  Continue Endocet 10/325 mg 1-2 daily as needed for severe or breakthrough pain.  Refill not needed today.  Duloxetine trial discussed - not started yet, patient has 30 mg when ready to take 1 daily x 7 days, then increase to 60 mg daily (may take 2 together or take 1 in am and 1 in pm).  Discussed this will take up to 4 weeks to see benefits for pain. Call nurse line with any concerns once started.  Try to reschedule neurology appointment with a different group.  Diagnostics: UDT/Blood collected today results are pending.  Patient required a random Drug Test due to the need to comply with [place] [employer] Policy Guidelines and CDC Guideline for the use of any controlled substances. Reasons for definitive testing include:  -Identify specific medication(s) or drug(s) in a class (e.g. Benzodiazepines, barbiturates, opioids, antidepressants)  -Definitive concentration of medication(s), drug(s), and/or metabolites needed to guide management  -Identify non-prescribed medication or illicit drug use for ongoing safe prescribing of controlled substances  -Identify substances that may present high risk for additive or synergistic interaction with prescription medication (e.g. Alcohol).  Patient is either being considered for or taking a controlled substance. Unexpected findings will be discussed and treatment decision may be adjusted. Testing is being implemented w/o bias related to age, race, gender, socioeconomic status or Hinduism affiliation.   Follow-up in 8 weeks with  Shannan by 3/18

## 2021-06-14 NOTE — PROGRESS NOTES
PAIN CLINIC PROGRESS NOTE    Subjective:   Citlalli Noonan is a 72 y.o. female who presents for evaluation of low back pain.  She has lumbar degeneration, OA bilateral knees, and diabetic peripheral neuropathy.  She has continued low dose methadone as this helps pain but does have intermittent nausea/vomiting as a side effect.  Weaning off medication caused uncontrolled pain and poor sleep and trial of other extended release opioids did no help reduce neuropathic pain.  She was also to continue Endocet, topicals, and muscle relaxants.  She has been educated on lumbar sympathetic block for lower extremity neuropathy.    Major issues:  1. Chronic pain syndrome    2. Lumbar Disc Degeneration    3. Opioid Dependence With Continuous Use    4. Diabetic peripheral neuropathy       Pain Ratin/10 constant aching pain lower back, bilateral feet.  Function rated 7.   Last visit pain rated 8/10.  States neuropathy in lower extremities is worse.  Radiation of pain: left sided leg pain ongoing and chronic to left posterior calf, intense paresthesias  Paresthesias, numbness, weakness: hand numbness.    Gait disturbance: occasionally uses a cane, denies recent falls.   GI/: No constipation or urinary or stool incontinence  Exacerbating factors: Stress, activity including walking or standing too long  Relieving factors: Pain improves with rest, medications, elevation of feet, lidoderm patches.  Adverse effects of medications: Nausea, vomiting intermittently. Takes Phenergan and compazine.  Constipation uses senna or Miralax but trying not to take every day, BM every 2-3 days.  Current treatment efficacy: Fair.  States she is taking methadone 5 mg every 6 hours, doesn't feel it is as effective (see below).  Endocet 10/325 mg works well for breakthrough pain, doesn't take daily.  Current treatment compliance: Fair. Missed her last appointment which delayed her methadone refill, see below. No current PT, injections, BH.      She  "was educated and recommended on Lyrica trial last visit and had some reservations regarding her vision and starting it.  She states she left a message with her opthalmologist and she never heard back.  She also has been educated on CBD/hemp oil for pain and did buy some and meant to bring today to her appointment for provider to review; she obtained the CBD capsule but hasn't started taking yet.      She states this month has been busy and stressful; her niece had open heart surgery and another niece had vein stripping.  She went to hospital, brought them things and visited.  Best friend has seizures which are out of control and she feels she has had constant activity due to this.  She states she is not sleeping well due to pain mostly.  She states she cannot lay on her right side at all, laying on left side causes numbness and pain and wakes her up every 2-3 hours.      She denies new pain symptoms or changes in health or medications since last seen.  She mentions wanting to reduce the amount of medications she is taking and has never had MTM services with a pharmacist; review this as an option.    Review of Systems  A 12 point ROS was completed and was negative except for above listed and ongoing nausea, intermittent vomiting.  Weight stable, denies fever.  Sleep interrupted due to stress and pain.       Objective:     Vitals:    12/21/17 1326   BP: 136/70   Pulse: 71   Resp: 16   Weight: 213 lb (96.6 kg)   Height: 5' 5\" (1.651 m)   PainSc:   8     Physical Exam  General- patient is alert and oriented, in NAD, well-groomed, well-nourished.  Appears comfortable, Obese. Presents alone today.  Psych- Judgment and insight normal, AOx4, speech and thought process normal, recent and remote memory normal, mood and affect concerned but pleasant.  Respiratory- breathing is non-labored, normal rhythm and rate.  Cardiovascular- extremities warm and well perfused, no peripheral edema or varicosities  Spine- normal gross " alignment, no focal tenderness on palpation, no spasms  Musculoskeletal- Able to sit to stand with difficulty.  Ambulates with antalgia independently.    *Opioid [place] Precautions:    UDS/Swab - reviewed from 10/19/17, results as expected  Opioid Consent -  due  Opioid Agreement - 03/23/17  Pharmacy- as documented    [place]  Reviewed - 12/21/17 as expected.  Pill Count - 10/17/16 appropriate  Psychological evaluation - n/a  MME - up to 75  Pharmacogenetic testing - n/a  EKG 04/03/17 Normal sinus rhythm QT/QTc 378/413 ms  NDI Score: 42 12/21/17     Assessment:   Citlalli Noonan is a 72 y.o. female seen in clinic today for lower extremity pain secondary to diabetic peripheral neuropathy and lumbar pain due to degeneration and left leg radiculopathy treated with methadone.  She is reporting increased neuropathy pain in lower extremities.  I do not advise to increase her methadone per CDC guidelines and also we have discussed the concept of tolerance on opioids.  She is educated on neuropathic medications, she has previously failed Gabapentin due to negative effects on blood sugars and Amitriptyline as it wasn't effective and she is recommended to start Lyrica for diabetic peripheral neuropathy. She is educated on possible risks and side effects and is concerned about potential for vision change as she has glaucoma.  I review the product information today and this incidence is about 7% for blurry/double vision on Lyrica, but I did not see any statement regarding worsening glaucoma.  She will also review this with her opthalmologist. We have also discussed supplements today including CBD oils to see if this is assistive.  She states she cannot afford the medical cannabis program at this time.       Plan:   Continue methadone 5 mg every 6 hours.   As your opioid dose remains stable, I will send electronic refills to the pharmacy for 2 subsequent months until your next appointment so you do not have to call our refill  line.  The fill dates for your medications are:  01/11/18 & 02/08/18  Check with your pharmacy that all prescriptions are on your profile. Call the pharmacy a week before you want to fill the prescription as stock may vary and the pharmacy may need to order the medication for you. I reserve the right to cancel the electronic prescription at the pharmacy in between appointments and would contact you with an explanation if this were to occur.  Continue Endocet 10/325 mg 1-2 daily as needed for severe or breakthrough pain - no refill needed today.   Discussed for neuropathy starting Lyrica 75 mg at night x 3 days, then increase to 75 mg twice a day.  Could continue to increase as needed.  Discussed possible side effects today, patient will discuss also with eye doctor.  Also recommend trial of CBD/hemp oil for neuropathy, either capsule or liquid form. Patient bought at [employer], call with label information to review prior to starting.  You may also try Melatonin 3 mg increase to 5 mg at night if needed for sleep  Patient would also be interested in visit with pharmacist to review medications due to polypharmacy - ask St[facility] Thrasher if they have this service or you could schedule here with Isabel Stephen, PharmD.  Follow-up in 8 weeks with Shannan Gagnon PA-C  [facility]  [address]  [place] [address]  Ph: [phone]  Fax: [phone]

## 2021-06-14 NOTE — PROGRESS NOTES
Patient presents to the clinic today for a follow up with Shannan Gagnon PA-C.     Pain score: 9  Constant   What does your pain feel like: ache, sharp at bedtime  Does the pain interfere with:  Work: no  Walking: yes  Sleep: yes  Daily activities: yes  Relationships: yes  F= 7

## 2021-06-14 NOTE — TELEPHONE ENCOUNTER
Central PA team  [phone]  Pool: HE PA MED ([id number])          PA has been initiated.       PA form completed and faxed insurance via Cover My Meds     Moe:  ZENAIDA ESCOBAR (Moe: [id number])     Medication:  Methadone 5mg    Insurance:  [employer]        Response will be received via fax and may take up to 5-10 business days depending on plan

## 2021-06-14 NOTE — TELEPHONE ENCOUNTER
Medication being requested: Methadone  Last visit date: 11/11/21.  Provider: MICHELLE  Next visit date: 1/21/21.  Provider: MICHELLE  Expected follow up: 10-12 weeks   MTM visit (Pain Center) date: No  UDT date: 1/2020  Agreement date: 9/2020   (Last fill date; name; strength; provider; MME; quantity):  12/18/2020 Methadone Hcl 5 Mg Tablet Qty: 168 for 28 days Ra War   Pertinent between visit information about requested medication (telephone, mychart, prior authorization, concerns, comments): No  Script being sent to provider by nurse- dates and quantity:   1/18/21-1/22/21 bridge through appointment  Requested Prescriptions     Pending Prescriptions Disp Refills     methadone (DOLOPHINE) 5 MG tablet [Pharmacy Med Name: Methadone HCl Oral Tablet 5 MG] 24 tablet 0     Sig: Take 3 tablets (15 mg total) by mouth every 12 (twelve) hours for 4 days.     Pharmacy cued: [facility]  Standing orders for withdrawal protocol implemented: LACHO

## 2021-06-14 NOTE — TELEPHONE ENCOUNTER
Medication being requested: Percocet  Last visit date: 11/11/21.  Provider: MICHELLE  Next visit date: 1/21/21.  Provider: MICHELLE  Expected follow up: 10-12 weeks   MTM visit (Pain Center) date: No  UDT date: 1/2020  Agreement date: 9/2020   (Last fill date; name; strength; provider; MME; quantity):  03/10/2020 Oxycodone-Acetaminophen  Qty: 60 for 30 days Ra War   Pertinent between visit information about requested medication (telephone, mychart, prior authorization, concerns, comments): No  Script being sent to provider by nurse- dates and quantity:   1/5/21-2/4/21, but seems to last longer as this is a PRN medication  Requested Prescriptions     Pending Prescriptions Disp Refills     oxyCODONE-acetaminophen (PERCOCET)  mg per tablet 60 tablet 0     Sig: Take 1-2 tablets by mouth daily as needed for pain (for severe breakthrough pain).     Pharmacy cued: [facility]  Standing orders for withdrawal protocol implemented: LACHO

## 2021-06-14 NOTE — PROGRESS NOTES
PAIN CLINIC PROGRESS NOTE    Subjective:   Citlalli Noonan is a 74 y.o. female who presents for evaluation of low back pain.  She has lumbar degeneration, OA bilateral knees, and diabetic peripheral neuropathy.  She has continued low dose methadone as this helps pain but does have intermittent nausea/vomiting as a side effect.  Weaning off medication caused uncontrolled pain and poor sleep and trial of other extended release opioids did no help reduce neuropathic pain.  She was also to continue Endocet, topicals, and muscle relaxants.  She has been educated on lumbar sympathetic block for lower extremity neuropathy.    Major issues:  1. Chronic, continuous use of opioids    2. Chronic pain syndrome    3. Lumbar Disc Degeneration    4. Lumbar radicular pain    5. Diabetic peripheral neuropathy (H)       Pain Ratin/10 constant aching sharp at bedtime pain in left leg to knee/ankle/foot, arm, lower back.  Function rated 7.     Radiation of pain: left sided leg pain ongoing and chronic to left posterior calf  Gait disturbance: occasionally uses a cane, denies recent falls.   Exacerbating factors: Stress, activity including walking or standing too long, moving, sleeping, weather  Relieving factors: medications, walking, rest.  Associated Symptoms: Numbness in bilateral feet/legs (neuropathy) and left hip, night pain. Denies fever/chills, weakness, bowel or bladder incontinence.   Functional Symptoms: See CMA notes  Adverse effects of medications: Nausea, vomiting intermittently 2-3 times per week. Takes zofran prn.  Constipation taking Apple cider vinegar capsule once per day helps keep her regular.    Current treatment efficacy: Good.  Methadone 15 mg two times a day.  Endocet 10/325 mg works well for breakthrough pain, doesn't take daily but useful at night if pain wakes her up she takes in evening and helps all night.   Current treatment compliance: Good. Keeping follow-up appointments, taking medication as  "prescribed. Recently completed PT for cervical spine.  Exercising at home with light weights and walking.      Since last seen, she has started Victoza and Soliqua, A1C DOWN TO 7.2%.  Reviewed visit on 12/18/2020 with Stacia Thrasher NP:  \"Diagnoses and all orders for this visit:    Type 2 diabetes mellitus with diabetic polyneuropathy, with long-term current use of insulin (HC)  - HEMOGLOBIN A1C MONITORING (POCT); Future  - EXTRA TUBE GOLD/SST; Future  - HEMOGLOBIN A1C MONITORING (POCT)  - EXTRA TUBE GOLD/SST  - liraglutide (VICTOZA) 0.6 mg/0.1 mL (18 mg/3 mL) injection; Inject 1.2 mg subcutaneous once daily for 90 days.  - AMB CONSULT TO CARE MANAGEMENT; Future    Chronic pain syndrome    Obstructive sleep apnea    Paroxysmal hemicrania    Type 2 diabetes mellitus without complication, with long-term current use of insulin (HC)  - losartan (COZAAR) 100 mg tablet; Take 1 tablet by mouth once daily.    Diabetes type 2, uncontrolled (aka DIABETES TYPE 2, UNCONTROLLED)  - metFORMIN (GLUCOPHAGE) 1,000 mg tablet; Take 1 tablet by mouth 2 times daily with meals.    Acquired hypothyroidism  - levothyroxine (SYNTHROID) 25 mcg tablet; Take 1 tablet by mouth before breakfast.    Hyperlipidemia  - atorvastatin (LIPITOR) 40 mg tablet; Take 1 tablet by mouth once daily.    Diabetes mellitus type 2, uncontrolled, with complications (HC)  - LANTUS SOLOSTAR U-100 INSULIN 100 unit/mL (3 mL) pen; Inject 30 units subcutaneous before bedtime. Product desired:LANTUS    Follow up with Linda regarding the Trulicity.    Follow up with me in 3 months on the diabetes, labs and everything else.    Follow up with Dr. Parmar per his schedule.    Follow up with the pain clinic per their schedule.    Follow up with [facility] regarding right sided headaches.    Follow up with getting the bone density test and mammogram done this year.\"      She missed visit at [facility] as she had positive COVID exposure through her brother.  She states " she doesn't want to r/s with them as they sent her a letter about a late cancel.  Has HA in morning when she wakes up in forehead and over left eye more than right eye.  Most days of the week.      She states nerve pain is most severe at night. She states it goes down her left leg to her foot.  She states during the day if she is up moving and walking she doesn't feel as much.  She would like to trial medical cannabis but cannot due to out of pocket cost.  She has tried gabapentin, amitriptyline, lyrica with side effects.  She denies other neuropathic medication trials including duloxetine, off label amantadine or namenda.  She has not taken PEA or CBD oil.   She stopped topamax 50 mg at night as it was too sedating into the next day, does not want to restart at 25 mg at this time.  She did find out that acupuncture is covered by insurance this year, interested in this but hasn't started due to COVID.      Recommended trial of duloxetine 30 mg every day x 7 days then 60 mg daily and she reports she hasn't started yet. Denies questions about it.    Review of Systems  A 12 point ROS was completed and was negative except for above listed and ongoing nausea, intermittent vomiting.  Weight stable, denies fever.  Sleep interrupted due to stress and pain.        Objective:     Vitals:    01/21/21 1440   BP: 125/75   Pulse: 79   Resp: 16       Physical Exam  Constitutional- General appearance: Normal.  Well developed, comfortable, overweight, and appearance reflects stated age.  Wearing knee brace left knee.  No acute distress or pain behaviors noted.  Presents alone today.  Psychiatric- Judgment and insight: Normal.  Speech: Normal rhythm.  Thought process: Normal.  No abnormal thoughts reported. Alert & Oriented to person, place, and time.  Recent and remote memory: Normal.  Observed mood: Appropriate  Respiratory- Breathing is non-labored; normal rhythm and rate.  Dermatologic- Exposed skin is clean, dry, and intact to  inspection.  Musculoskeletal- Gait and station: Ambulates independently with antalgia. Able to sit to stand with some difficulty.    *Opioid [place] Precautions:    UDT/Blood - Collected 1/21/21 results pending  Opioid Consent - 01/21/2021  Opioid Agreement - 01/21/2021  Pharmacy- as documented    [place]  Reviewed - 01/21/2021 as expected.  Pill Count - 01/15/20 appropriate  Psychological evaluation - n/a  MME - 90, CDC adjusted MME is 240  Pharmacogenetic testing - n/a  EKG 10/01/19, NSR and QT/QTc 400/419 ms    Imaging:  CT Head Brain without Contrast 01/2/2020  IMPRESSION:  1.  Unchanged postoperative appearance when compared to 08/28/2018. No new acute  intracranial abnormality.     2.  Unchanged small amount of enhancing tissue in the right aspect of the sella  which may represent normal residual pituitary tissue. No convincing evidence of  residual/recurrent tumor.    EXAM: XR HIP 2 OR 3 VIEWS W PELVIS LEFT  LOCATION: [facility]  DATE/TIME: 12/23/2019 11:54 AM    INDICATION: Osteoarthritis Of Spine With Radiculopathy, Lumbar Region  COMPARISON: 08/15/2017    Findings:  The symphysis pubis and pubic rami are normal. The hips demonstrate a mild  degree of symmetric degenerative change characterized by central loss of  articular cartilage, minor marginal spurring and minimal sclerosis. Targeted  attention of the left hip is negative for fracture. There is a 2 cm focus of  circumscribed calcification in the subcutaneous soft tissues of the  anterolateral aspect of the left proximal thigh. These findings are stable  dating back to 2017 and likely represent an area of old osteoporosis. The sacral  wings, sacroiliac joints and iliac wings are negative.    IMPRESSION: Negative for acute bony abnormality. Mild symmetric degenerative  changes in both hips.     CT Spine Lumbar without Contrast 10/19/20  IMPRESSION:    1.  Postsurgical changes right L5 hemilaminectomy.  2.  Sclerotic lesion within the  left aspect of the sacrum, relatively stable  since 05/03/2010, which most consistent with a bone island.  3.  No evidence of acute fracture or subluxation of the lumbar spine by CT  imaging.  4.  Lumbar spondylosis with level by level analysis as described above    Assessment:   Citlalli Noonan is a 74 y.o. female with visit today for lower extremity pain secondary to diabetic peripheral neuropathy, cervical pain with CT scan demonstrating multiple levels of moderate stenosis and degeneration, lumbar pain due to degeneration and left leg radiculopathy treated with methadone.  She had updated lumbar CT and we will review lumbar facet injection treatment as needed.  For neuropathic pain, discuss failure of gabapentin, amitriptyline and lyrica, and intolerance to 50 mg topamax (may consider 25 mg trial in the future until she can tolerate higher dose).  Recommend a trial of duloxetine for chronic pain; she is educated on this medication and possible risks/side effects.  She is also educated on risk with respiratory depression on opioids with untreated sleep apnea; she plans to see PCP to re-evaluate for CPAP.  She needs to reschedule with Neurology for headaches.  She is following PCP for acute left knee pain, also discuss orthopedics as an option.    Plan:   Continue methadone 15 mg every 12 hours.  Check with your pharmacy that all prescriptions are on your profile. Call the pharmacy a week before you want to fill the prescription as stock may vary and the pharmacy may need to order the medication for you. I reserve the right to cancel the electronic prescription at the pharmacy in between appointments and would contact you with an explanation if this were to occur.  Refills sent for 01/21/2021 & 02/18/2021  Continue Endocet 10/325 mg 1-2 daily as needed for severe or breakthrough pain.  Refill not needed today.  Duloxetine trial discussed - not started yet, patient has 30 mg when ready to take 1 daily x 7 days,  then increase to 60 mg daily (may take 2 together or take 1 in am and 1 in pm).  Discussed this will take up to 4 weeks to see benefits for pain. Call nurse line with any concerns once started.  Try to reschedule neurology appointment with a different group.  Diagnostics: UDT/Blood collected today results are pending.  Patient required a random Drug Test due to the need to comply with [place] Policy Guidelines and CDC Guideline for the use of any controlled substances. Reasons for definitive testing include:  -Identify specific medication(s) or drug(s) in a class (e.g. Benzodiazepines, barbiturates, opioids, antidepressants)  -Definitive concentration of medication(s), drug(s), and/or metabolites needed to guide management  -Identify non-prescribed medication or illicit drug use for ongoing safe prescribing of controlled substances  -Identify substances that may present high risk for additive or synergistic interaction with prescription medication (e.g. Alcohol).  Patient is either being considered for or taking a controlled substance. Unexpected findings will be discussed and treatment decision may be adjusted. Testing is being implemented w/o bias related to age, race, gender, socioeconomic status or Baptism affiliation.   Follow-up in 8 weeks with Shannan by 3/18    ROSHAN [facility]  [address]  [place] [address]  Ph: [phone]  Fax: [phone]

## 2021-06-15 PROBLEM — D49.7 PITUITARY TUMOR: Status: ACTIVE | Noted: 2017-04-14

## 2021-06-15 PROBLEM — D35.2 PITUITARY ADENOMA (H): Status: ACTIVE | Noted: 2017-01-24

## 2021-06-15 PROBLEM — Z48.02 REMOVAL OF STAPLES: Status: ACTIVE | Noted: 2017-05-05

## 2021-06-16 NOTE — TELEPHONE ENCOUNTER
Telephone Encounter by Bev Macias RN at 10/23/2019  3:55 PM     Author: Bev Macias RN Service: -- Author Type: Registered Nurse    Filed: 10/23/2019  4:03 PM Encounter Date: 10/23/2019 Status: Signed    : Bev Macias RN (Registered Nurse)       Medication being requested:  Methadone  Last visit date: 9/17  Provider:  MICHELLE  Next visit date: 11/12   Provider: MICHELLE  Expected follow up: 8 weeks  MTM visit (Pain Center) date: na  UDT date: 12/27/18  Agreement date: 4/19/19   snipped in:      Pertinent between visit information about requested medication (telephone, mychart, prior authorization, concerns, comments): na  Script being sent to provider by nurse- dates and quantity:   Requested Prescriptions     Pending Prescriptions Disp Refills   ? methadone (DOLOPHINE) 5 MG tablet 120 tablet 0     Sig: Take 10 mg in am, 5 mg in pm, and 15 mg at bedtime     Pharmacy cued: [facility]  Standing orders for withdrawal protocol implemented: tonja

## 2021-06-16 NOTE — TELEPHONE ENCOUNTER
Medication being requested: methadone  Last visit date: 3/18/21  Provider: MICHELLE  Next visit date: 5/13/21  Provider: MICHELLE  Expected follow up: 8 weeks  MTM visit (Pain Center) date: no  UDT/CSA- 01/2021   (Last fill date; name; strength; provider; MME; quantity):  Not responding at this time  RX refill dates based on current RX start and end dates  Pertinent between visit information about requested medication (telephone, mychart, prior authorization, concerns, comments):   Patient voices that she is calling over a week ahead of this being due  She is not asking for percocet as of this phone call  Script being sent to provider by nurse- dates and quantity:   Requested Prescriptions     Pending Prescriptions Disp Refills     methadone (DOLOPHINE) 5 MG tablet 140 tablet 0     Sig: take 3 tablets by mouth every am and 2 tabs po every pm     Pharmacy cued: [facility]  Standing orders for withdrawal protocol implemented: L[place]O

## 2021-06-16 NOTE — PROGRESS NOTES
PAIN CLINIC PROGRESS NOTE    Subjective:   Citlalli Noonan is a 74 y.o. female who presents for evaluation of low back pain.  She has lumbar degeneration, OA bilateral knees, and diabetic peripheral neuropathy.  She has continued low dose methadone as this helps pain but does have intermittent nausea/vomiting as a side effect.  Weaning off medication caused uncontrolled pain and poor sleep and trial of other extended release opioids did no help reduce neuropathic pain.  She was also to continue Endocet, topicals, and muscle relaxants.  She has been educated on lumbar sympathetic block for lower extremity neuropathy.    Major issues:  1. Chronic, continuous use of opioids    2. Chronic pain syndrome    3. Lumbar Disc Degeneration    4. Lumbar radicular pain    5. Diabetic peripheral neuropathy (H)       Pain Rating: see CMA note  Radiation of pain: left sided leg pain ongoing and chronic to left posterior calf  Gait disturbance: occasionally uses a cane, denies recent falls.   Exacerbating factors: Stress, activity including walking or standing too long, moving, sleeping, weather  Relieving factors: medications, walking, rest.  Associated Symptoms: Numbness in bilateral feet/legs (neuropathy) and left hip, night pain. Denies fever/chills, weakness, bowel or bladder incontinence.   Functional Symptoms: See CMA notes  Adverse effects of medications: Nausea, vomiting intermittently 2-3 times per week. Takes zofran prn.  Constipation taking Apple cider vinegar capsule once per day helps keep her regular.    Current treatment efficacy: Good.  Methadone 15 mg two times a day.  Endocet 10/325 mg works well for breakthrough pain, doesn't take daily but useful at night if pain wakes her up she takes in evening and helps all night.   Current treatment compliance: Good. Keeping follow-up appointments, taking medication as prescribed. Recently completed PT for cervical spine.  Exercising at home with light weights and walking.   "    She states she saw Stacia Thrasher NP on 03/18/2021 prior to our visit, reviewed note with hemoglobin 7.9%.  She states she has not tolerated the newer DM2 medication trials.  She met with neurologist on 03/17/2021 and had EMG reviewed today:  \"Impression    1. No evidence of mononeuropathy by nerve conduction studies    2. Subtle mild chronic motor changes in the ulnar innervated hand muscles is nonspecific. Could be seen with subtle motor ulnar neuropathy. Localization unclear as nerve conduction studies are normal\"    She states she has to return to see Dr. Kei Mccollum, [facility].  She states her wrist feels like a toothache and was told not carpal tunnel but possible ulnar neuropathy.  She states she is unsure if she will pursue surgery.  She is having difficulty making a fist difficult to  objects.  She has numbness in her palm which she can feel some tingling.      She states her left knee was \"so bad\" for awhile but she is doing ok right now.  She is having more left sided low back pain and she wants to refill her Endocet for increase in pain, takes no more than 1 tab per day.   Takes with Ibuprofen 800 mg.  Vacuuming increases her low back pain but \"has to do it.\"   She cleaned her whole apartment as her niece was visiting.  Dishes and laundry also increase her pain.    She states she sweats so bad all year long and spends whole summer in the house.  Her hair gets wet since pituitary surgery and \"pours off her.\"  She has seen specialists regarding this and discussed it is likely related to medications. We discuss methadone.  Would like to reduce the dose. Night sweat and fan on her even in the winter.   She would like to trial medical cannabis but cannot due to out of pocket cost.  She has tried gabapentin, amitriptyline, lyrica with side effects.  She denies other neuropathic medication trials including duloxetine, off label amantadine or namenda.  She has not taken PEA or CBD oil.   She " stopped topamax 50 mg at night as it was too sedating into the next day, does not want to restart at 25 mg at this time.  She did find out that acupuncture is covered by insurance this year, interested in this but hasn't started due to COVID.      Recommended trial of duloxetine 30 mg every day x 7 days then 60 mg daily and she reports she hasn't started yet. Denies questions about it.    Her niece is getting strong radiation and chemotherapy and 3rd week.  She states she has 7 weeks of it.  She is in [place] so she cannot help her out right now.  She reports a lot more family stress with this and she is hoping to go visit her soon.    Review of Systems  A 12 point ROS was completed and was negative except for above listed and ongoing nausea, intermittent vomiting.  Weight stable, denies fever.  Sleep interrupted due to stress and pain.        Objective:     Vitals:    03/18/21 1518   BP: 109/61   Pulse: 66   Resp: 16       Physical Exam  Constitutional- General appearance: Normal.  Well developed, comfortable, overweight, and appearance reflects stated age.  Wearing knee brace left knee.  No acute distress or pain behaviors noted.  Presents alone today.  Psychiatric- Judgment and insight: Normal.  Speech: Normal rhythm.  Thought process: Normal.  No abnormal thoughts reported. Alert & Oriented to person, place, and time.  Recent and remote memory: Normal.  Observed mood: Appropriate  Respiratory- Breathing is non-labored; normal rhythm and rate.  Dermatologic- Exposed skin is clean, dry, and intact to inspection.  Musculoskeletal- Gait and station: Ambulates independently with antalgia. Able to sit to stand with some difficulty.    *Opioid [place] Precautions:    UDT/Blood - Reviewed from 1/21/21 results as expected  Opioid Consent - 01/21/2021  Opioid Agreement - 01/21/2021  Pharmacy- as documented    [place]  Reviewed - 03/18/2021 as expected.  Pill Count - 01/15/20 appropriate  Psychological evaluation -  n/a  MME - 90, CDC adjusted MME is 240  Pharmacogenetic testing - n/a  EKG 10/01/19, NSR and QT/QTc 400/419 ms    Imaging:  CT Head Brain without Contrast 01/2/2020  IMPRESSION:  1.  Unchanged postoperative appearance when compared to 08/28/2018. No new acute  intracranial abnormality.     2.  Unchanged small amount of enhancing tissue in the right aspect of the sella  which may represent normal residual pituitary tissue. No convincing evidence of  residual/recurrent tumor.    EXAM: XR HIP 2 OR 3 VIEWS W PELVIS LEFT  LOCATION: [facility]  DATE/TIME: 12/23/2019 11:54 AM    INDICATION: Osteoarthritis Of Spine With Radiculopathy, Lumbar Region  COMPARISON: 08/15/2017    Findings:  The symphysis pubis and pubic rami are normal. The hips demonstrate a mild  degree of symmetric degenerative change characterized by central loss of  articular cartilage, minor marginal spurring and minimal sclerosis. Targeted  attention of the left hip is negative for fracture. There is a 2 cm focus of  circumscribed calcification in the subcutaneous soft tissues of the  anterolateral aspect of the left proximal thigh. These findings are stable  dating back to 2017 and likely represent an area of old osteoporosis. The sacral  wings, sacroiliac joints and iliac wings are negative.    IMPRESSION: Negative for acute bony abnormality. Mild symmetric degenerative  changes in both hips.     CT Spine Lumbar without Contrast 10/19/20  IMPRESSION:    1.  Postsurgical changes right L5 hemilaminectomy.  2.  Sclerotic lesion within the left aspect of the sacrum, relatively stable  since 05/03/2010, which most consistent with a bone island.  3.  No evidence of acute fracture or subluxation of the lumbar spine by CT  imaging.  4.  Lumbar spondylosis with level by level analysis as described above    Assessment:   Citlalli Noonan is a 74 y.o. female with visit today for lower extremity pain secondary to diabetic peripheral neuropathy,  cervical pain with CT scan demonstrating multiple levels of moderate stenosis and degeneration, lumbar pain due to degeneration and left leg radiculopathy treated with methadone.  She had updated lumbar CT and we will review lumbar facet injection treatment as needed.  For neuropathic pain, discuss failure of gabapentin, amitriptyline and lyrica, and intolerance to 50 mg topamax (may consider 25 mg trial in the future until she can tolerate higher dose).  Recommend a trial of duloxetine for chronic pain; she is educated on this medication and possible risks/side effects.  She is also educated on risk with respiratory depression on opioids with untreated sleep apnea; she plans to see PCP to re-evaluate for CPAP.  Discuss opioids can also cause hyperhidrosis that she is experiencing and she wishes to start reducing dose of methadone.      Plan:   Reduce methadone 15 mg in the am and 10 mg at night x 2 weeks, if tolerated then can reduce to 10 mg every 12 hours.  Check with your pharmacy that all prescriptions are on your profile. Call the pharmacy a week before you want to fill the prescription as stock may vary and the pharmacy may need to order the medication for you. I reserve the right to cancel the electronic prescription at the pharmacy in between appointments and would contact you with an explanation if this were to occur.  Refills sent for 03/19/2021.  Please call our phone # [phone] and choose option #4 to request a medication refill seven days in advance for your second month opioid prescription refill to be sent electronically to your pharmacy.  We will not return a phone call when the refill is sent to your pharmacy, but expect you to communicate with your pharmacy to ensure it is received and ready by the date needed.  Continue Endocet 10/325 mg 1-2 daily as needed for severe or breakthrough pain.  Refill sent today.  Duloxetine trial discussed - not started yet, patient has 30 mg when ready to  take 1 daily x 7 days, then increase to 60 mg daily (may take 2 together or take 1 in am and 1 in pm).  Discussed this will take up to 4 weeks to see benefits for pain. Call nurse line with any concerns once started.  DIANA to [facility] for EMG and 's recommendations.  Follow-up in 8 weeks with ROSHAN [facility]  [address]  [place] [address]  Ph: [phone]  Fax: [phone]

## 2021-06-16 NOTE — TELEPHONE ENCOUNTER
Telephone Encounter by Gretta Suazo at 4/22/2019  9:29 AM     Author: Gretta Suazo Service: -- Author Type: --    Filed: 4/22/2019  9:30 AM Encounter Date: 4/22/2019 Status: Signed    : Gretta Suazo APPROVED:    Approval start date:03/23/2019  Approval end date:04/21/2022    Pharmacy has been notified of approval and will contact patient when medication is ready for pickup.

## 2021-06-16 NOTE — TELEPHONE ENCOUNTER
Telephone Encounter by Bin Blood at 3/11/2020  9:28 AM     Author: Bin Blood Service: -- Author Type: --    Filed: 3/11/2020  9:29 AM Encounter Date: 3/10/2020 Status: Signed    : [facility] APPROVED:    Approval start date: 02/09/2020  Approval end date:  03/10/2021    Pharmacy has been notified of approval and will contact patient when medication is ready for pickup.

## 2021-06-16 NOTE — TELEPHONE ENCOUNTER
Telephone Encounter by Yanna Arcos at 3/6/2019  1:17 PM     Author: Yanna Arcso Service: -- Author Type: --    Filed: 3/6/2019  1:19 PM Encounter Date: 3/6/2019 Status: Signed    : Yanna Arcos APPROVED:    Approval start date: 2/4/19  Approval end date: 3/5/2020    Pharmacy has been notified of approval and will contact patient when medication is ready for pickup.

## 2021-06-16 NOTE — TELEPHONE ENCOUNTER
Telephone Encounter by Deborah Lincoln RN at 12/9/2019  4:21 PM     Author: Deborah Lincoln RN Service: -- Author Type: Registered Nurse    Filed: 12/9/2019  4:26 PM Encounter Date: 12/7/2019 Status: Signed    : Deborah Lincoln RN (Registered Nurse)       Medication being requested: methadone 5mg qu 168 for 28 day  Last visit date: 11/12.  Provider: MICHELLE  Next visit date: 1/9.  Provider: MICHELLE  Expected follow up: 8wk  MT visit ([facility]) date: no  UDT date: 12/18  Agreement date: 4/19   snipped in:    Pertinent between visit information about requested medication (telephone, mychart, prior authorization, concerns, comments): no  Script being sent to provider by nurse- dates and quantity: due 12/10 from last refill 28day  Requested Prescriptions     Pending Prescriptions Disp Refills   ? methadone (DOLOPHINE) 5 MG tablet [Pharmacy Med Name: Methadone HCl Oral Tablet 5 MG] 168 tablet 0     Sig: take 2 tablets by mouth in the morning, one tablet by mouth in the evening, and 3 tablets by mouth at bedtime     Pharmacy cued: AMANDA luciano  Standing orders for withdrawal protocol implemented: no

## 2021-06-16 NOTE — TELEPHONE ENCOUNTER
"Telephone Encounter by Lachelle Gomez RN at 1/17/2020 10:00 AM     Author: Lachelle Gomez RN Service: -- Author Type: Registered Nurse    Filed: 1/17/2020 10:11 AM Encounter Date: 1/15/2020 Status: Signed    : Lachelle Gomez RN (Registered Nurse)       Patient in for requested pill count at this time: methadone 5 mg and percocet 10/325    Consistent/inconsistent with medication ordered: yes    Percocet 10/325 mg  Order date: 9/17/2019  Quantity ordered:60  Expected count:0  Actual count:42  Last dose taken: \"long time ago\" patient reports she may take 1-2 today as she is having an increased amount of pain.  Count consistent as patient only takes very sparingly    Methadone 5 mg  Order date: 1/9/2019  Quantity ordered: 168 tablets  Expected: 126    Count is consistent/inconsistent with expected: 136, patient has more remaining than she should if taking as prescribed at 6 tablets daily    Returned to patient:yes  Destroyed:no    UDT obtained:no                                 "

## 2021-06-16 NOTE — PROGRESS NOTES
PAIN CLINIC PROGRESS NOTE    Subjective:   Citlalli Noonan is a 72 y.o. female who presents for evaluation of low back pain.  She has lumbar degeneration, OA bilateral knees, and diabetic peripheral neuropathy.  She has continued low dose methadone as this helps pain but does have intermittent nausea/vomiting as a side effect.  Weaning off medication caused uncontrolled pain and poor sleep and trial of other extended release opioids did no help reduce neuropathic pain.  She was also to continue Endocet, topicals, and muscle relaxants.  She has been educated on lumbar sympathetic block for lower extremity neuropathy.    Major issues:  1. Chronic pain syndrome    2. Opioid Dependence With Continuous Use    3. Lumbar Disc Degeneration    4. Diabetic peripheral neuropathy       Pain Ratin/10 constant awful, hurting pain lower back, bilateral feet, right knee.  Function rated 8.   Last visit pain rated 8/10.  States neuropathy in lower extremities is worse.    Radiation of pain: left sided leg pain ongoing and chronic to left posterior calf, intense paresthesias  Paresthesias, numbness, weakness: hand numbness.    Gait disturbance: occasionally uses a cane, denies recent falls.   GI/: No constipation or urinary or stool incontinence  Exacerbating factors: Stress, activity including walking or standing too long, moving  Relieving factors: Pain improves with rest, medications, elevation of feet, lidoderm patches.  Associated Symptoms: Numbness in bilateral feet and left hip, weakness, night pain, fever/chills.  Denies bowel or bladder incontinence, unexplained weight loss.  Functional Symptoms: Pain interferes with sleep,walking, work, ADLs, relationships.  Adverse effects of medications: Nausea, vomiting intermittently. Takes Phenergan and compazine.  Constipation uses senna or Miralax but trying not to take every day, BM every 2-3 days.  Current treatment efficacy: Fair.  States she is taking methadone 5 mg every  "6 hours, doesn't feel it is as effective (see below).  Endocet 10/325 mg works well for breakthrough pain, doesn't take daily.  Current treatment compliance: Fair. Keeping follow-up appointments and taking medication as prescribed. No current PT, injections, BH.      She denies any anticoagulant medication use, denies visits to ED/urgent care since last seen, denies new medications.      She was educated and recommended on Lyrica trial last visit and had some reservations regarding her vision and starting it. She spoke to opthalmologist and was reassured she may take it.  She is concerned about the cost, we review the Pf[employer] financial assistance program and she will apply.  She also has been educated on CBD/hemp oil for pain and did start using  CBD 2 weeks ago TID and helping neuropathy in feet.  Denies nausea, dizziness since starting.      Will see orthopedics next week for left hip pain, [facility]    XR HIP 2 VIEWS WO PELVIS LEFT  8/15/2017 3:56 PM    INDICATION:  Bilateral Hip Pain Bilateral Hip Pain  COMPARISON: None.    FINDINGS: There is minor degenerative change in the left hip with slight central loss of articular joint space. There are no acute abnormalities identified. The left hemipelvis visualized is intact.    IMPRESSION:    Minor degenerative changes in the left hip, otherwise negative.  She states some days difficult to walk.  She has had hip bursa injections in the past which worked for a week and then pain returned.  She denies hip PT.  She is not taking OTC medications, using heat/ice, or topicals for pain.     She states her legs are sore today; her niece is visiting and she was shopping with her and when she is around she \"does a lot more.\"      Review of Systems  A 12 point ROS was completed and was negative except for above listed and ongoing nausea, intermittent vomiting.  Weight stable, denies fever.  Sleep interrupted due to stress and pain.       Objective: " "    Vitals:    02/15/18 1451   BP: 102/65   Pulse: 65   Resp: 16   Weight: 213 lb (96.6 kg)   Height: 5' 5\" (1.651 m)   PainSc:   8     Physical Exam  General- patient is alert and oriented, in NAD, well-groomed, well-nourished.  Appears comfortable, Obese. Presents alone today.  Psych- Judgment and insight normal, AOx4, speech and thought process normal, recent and remote memory normal, mood and affect concerned but pleasant.  Respiratory- breathing is non-labored, normal rhythm and rate.  Cardiovascular- extremities warm and well perfused, no peripheral edema or varicosities  Spine- normal gross alignment, no focal tenderness on palpation, no spasms  Musculoskeletal- Able to sit to stand with difficulty.  Ambulates with antalgia independently.    *Opioid [place] Precautions:    UDS/Swab - 10/19/17, results as expected  Opioid Consent -  due  Opioid Agreement - 03/23/17  Pharmacy- as documented    [place]  Reviewed - 02/15/18 as expected.  Pill Count - 10/17/16 appropriate  Psychological evaluation - n/a  MME - up to 75  Pharmacogenetic testing - n/a  EKG 04/03/17 Normal sinus rhythm QT/QTc 378/413 ms  FAUSTINO 12/21/17     Assessment:   Citlalli Noonan is a 72 y.o. female seen in clinic today for lower extremity pain secondary to diabetic peripheral neuropathy and lumbar pain due to degeneration and left leg radiculopathy treated with methadone.  She is reporting increased neuropathy pain in lower extremities.  I do not advise to increase her methadone per CDC guidelines and also we have discussed the concept of tolerance on opioids.  She is educated on neuropathic medications, she has previously failed Gabapentin due to negative effects on blood sugars and Amitriptyline as it wasn't effective and she is recommended to start Lyrica for diabetic peripheral neuropathy.  She will apply for financial assistance for this medication, given the application to complete today.  She has also started CBD oils and is encouraged " to continue as she has noticed some improvement in her neuropathy pain.  She states she cannot afford the medical cannabis program at this time.       She is having increase in left hip pain, pending consult with orthopedics next week.  X-ray demonstrated mild DJD.  She states she cannot take oral NSAIDS due to stomach upset.      Plan:   Continue methadone 5 mg every 6 hours.    Nucynta ER to consider for diabetic neuropathy pain - would need a prior authotization and check on copay cost  As your opioid dose remains stable, I will send electronic refills to the pharmacy for 2 subsequent months until your next appointment so you do not have to call our refill line.  The fill dates for your medications are:  02/15/18 & 03/15/18  Check with your pharmacy that all prescriptions are on your profile. Call the pharmacy a week before you want to fill the prescription as stock may vary and the pharmacy may need to order the medication for you. I reserve the right to cancel the electronic prescription at the pharmacy in between appointments and would contact you with an explanation if this were to occur.  Continue Endocet 10/325 mg 1-2 daily as needed for severe or breakthrough pain - no refill needed today.   Also continue CBD/hemp oil for neuropathy  Discussed applying for Pf[employer] financial assistance form to trial Lyrica; form given today to complete and return to us.  See orthopedic as scheduled for left hip evaluation  Follow-up in 8 weeks with Shannan Gagnon PA-C  [facility]  [address] [place] [address]. [address]  [place] [address]  Ph: [phone]  Fax: [phone]

## 2021-06-16 NOTE — PATIENT INSTRUCTIONS - HE
Reduce methadone 15 mg in the am and 10 mg at night x 2 weeks, if tolerated then can reduce to 10 mg every 12 hours.  Check with your pharmacy that all prescriptions are on your profile. Call the pharmacy a week before you want to fill the prescription as stock may vary and the pharmacy may need to order the medication for you. I reserve the right to cancel the electronic prescription at the pharmacy in between appointments and would contact you with an explanation if this were to occur.  Refills sent for 03/19/2021.  Please call our phone # [phone] and choose option #4 to request a medication refill seven days in advance for your second month opioid prescription refill to be sent electronically to your pharmacy.  We will not return a phone call when the refill is sent to your pharmacy, but expect you to communicate with your pharmacy to ensure it is received and ready by the date needed.  Continue Endocet 10/325 mg 1-2 daily as needed for severe or breakthrough pain.  Refill sent today.  Duloxetine trial discussed - not started yet, patient has 30 mg when ready to take 1 daily x 7 days, then increase to 60 mg daily (may take 2 together or take 1 in am and 1 in pm).  Discussed this will take up to 4 weeks to see benefits for pain. Call nurse line with any concerns once started.  DIANA to [facility] for EMG and 's recommendations.  Follow-up in 8 weeks with Shannan

## 2021-06-16 NOTE — TELEPHONE ENCOUNTER
Review of [place]  also does not load for me.  Patient last visit requested to start tapering methadone to 15 mg in the am and 10 mg at night and then try to reduce to 10 mg every 12 hours.  Will renew at current dosing as she didn't state she had reduced lower.

## 2021-06-17 ENCOUNTER — COMMUNICATION - HEALTHEAST (OUTPATIENT)
Dept: PALLIATIVE MEDICINE | Facility: OTHER | Age: 76
End: 2021-06-17

## 2021-06-17 DIAGNOSIS — M48.02 CERVICAL STENOSIS OF SPINAL CANAL: ICD-10-CM

## 2021-06-17 NOTE — PATIENT INSTRUCTIONS - HE
Patient Instructions by Randa Oliver PT at 10/16/2019  8:30 AM     Author: Randa Oliver PT Service: -- Author Type: Physical Therapist    Filed: 10/16/2019  4:55 PM Encounter Date: 10/16/2019 Status: Addendum    : Randa Oliver PT (Physical Therapist)    Related Notes: Original Note by Randa Oliver PT (Physical Therapist) filed at 10/16/2019  9:28 AM       When starting to feel more nausea - can try with light circular massage on neck to try to decrease chance of vomiting       SCAPULAR RETRACTIONS    Draw your shoulder blades back and down.  Hold x3-5 seconds x5-10 reps  2-3x/day      CHIN TUCK - SUPINE    While lying on your back, tuck your chin towards your chest and press the back of your head into the table.    Maintain contact of head with the surface you are lying on the entire time.  Hold x2-3 seconds - effort only as far as feels good to do.  Perform x5-10 reps 2-3x/day    RETRACTION / CHIN TUCK    Slowly draw your head back so that your ears line up with your shoulders.  Hold x2-3 seconds - effort only as far as feels good to do.  Perform x5-10 reps 2-3x/day        CERVICAL ROTATION    Turn your head towards the side, then return back to looking straight ahead.  x5-10 reps - SLOW and SMALL amount  2-3x/day  Can move eyes with head or keep eyes straight forward focused on a spot on the wall to change dizziness.    IF MORE DIZZY - CAN JUST MOVE EYES BACK AND FORTH TO STILL HAVE BENEFIT         Holding weight of head in hands to help back of neck relax!      DEEP BREATHING inhale nose 4-5 seconds exhale mouth 5-6 seconds and try releasing tension on exhale  OR SQUARE BREATHING - inhale for 4-5 seconds, hold x4-5 seconds, exhale x4-5 seconds, hold x4-5 seconds - can repeat for 2-3 minutes

## 2021-06-17 NOTE — PATIENT INSTRUCTIONS - HE
Patient Instructions by Randa Oliver PT at 11/13/2019  9:30 AM     Author: Randa Oliver PT Service: -- Author Type: Physical Therapist    Filed: 11/13/2019 10:05 AM Encounter Date: 11/13/2019 Status: Signed    : Randa Oliver PT (Physical Therapist)       When starting to feel more nausea - can try with light circular massage on neck to try to decrease chance of vomiting       SCAPULAR RETRACTIONS    Draw your shoulder blades back and down.  Hold x3-5 seconds x1-2 reps      ELASTIC BAND ROWS     Holding elastic band with both hands, draw back the band as you bend your elbows. Keep your elbows near the side of your body.  x5 seconds hold x10 reps 1-2x/day        CHIN TUCK - SUPINE    While lying on your back, tuck your chin towards your chest and press the back of your head into the table.    Maintain contact of head with the surface you are lying on the entire time.  Hold x2-3 seconds - effort only as far as feels good to do.  Perform x5-10 reps 2-3x/day    RETRACTION / CHIN TUCK    Slowly draw your head back so that your ears line up with your shoulders.  Hold x2-3 seconds - effort only as far as feels good to do.  Perform x5-10 reps 2-3x/day        CERVICAL ROTATION    Turn your head towards the side, then return back to looking straight ahead.  x5-10 reps - SLOW and SMALL amount  2-3x/day  Can move eyes with head or keep eyes straight forward focused on a spot on the wall to change dizziness.    IF MORE DIZZY - CAN JUST MOVE EYES BACK AND FORTH TO STILL HAVE BENEFIT         Holding weight of head in hands to help back of neck relax! DON'T let head drop :)       DEEP BREATHING inhale nose 4-5 seconds exhale mouth 5-6 seconds and try releasing tension on exhale  OR SQUARE BREATHING - inhale for 4-5 seconds, hold x4-5 seconds, exhale x4-5 seconds, hold x4-5 seconds - can repeat for 2-3 minutes

## 2021-06-17 NOTE — PATIENT INSTRUCTIONS - HE
Patient Instructions by Randa Oliver PT at 10/30/2019  9:30 AM     Author: Randa Oliver PT Service: -- Author Type: Physical Therapist    Filed: 10/30/2019 10:07 AM Encounter Date: 10/30/2019 Status: Signed    : Randa Oliver PT (Physical Therapist)       When starting to feel more nausea - can try with light circular massage on neck to try to decrease chance of vomiting       SCAPULAR RETRACTIONS    Draw your shoulder blades back and down.  Hold x3-5 seconds x5-10 reps  2-3x/day      CHIN TUCK - SUPINE    While lying on your back, tuck your chin towards your chest and press the back of your head into the table.    Maintain contact of head with the surface you are lying on the entire time.  Hold x2-3 seconds - effort only as far as feels good to do.  Perform x5-10 reps 2-3x/day    RETRACTION / CHIN TUCK    Slowly draw your head back so that your ears line up with your shoulders.  Hold x2-3 seconds - effort only as far as feels good to do.  Perform x5-10 reps 2-3x/day        CERVICAL ROTATION    Turn your head towards the side, then return back to looking straight ahead.  x5-10 reps - SLOW and SMALL amount  2-3x/day  Can move eyes with head or keep eyes straight forward focused on a spot on the wall to change dizziness.    IF MORE DIZZY - CAN JUST MOVE EYES BACK AND FORTH TO STILL HAVE BENEFIT         Holding weight of head in hands to help back of neck relax!      DEEP BREATHING inhale nose 4-5 seconds exhale mouth 5-6 seconds and try releasing tension on exhale  OR SQUARE BREATHING - inhale for 4-5 seconds, hold x4-5 seconds, exhale x4-5 seconds, hold x4-5 seconds - can repeat for 2-3 minutes

## 2021-06-17 NOTE — PATIENT INSTRUCTIONS - HE
Patient Instructions by Randa Oliver PT at 10/2/2019  8:30 AM     Author: Randa Oliver PT Service: -- Author Type: Physical Therapist    Filed: 10/2/2019  9:35 AM Encounter Date: 10/2/2019 Status: Signed    : Randa Oliver PT (Physical Therapist)        SCAPULAR RETRACTIONS    Draw your shoulder blades back and down.  Hold x3-5 seconds x5-10 reps  2-3x/day      CHIN TUCK - SUPINE    While lying on your back, tuck your chin towards your chest and press the back of your head into the table.    Maintain contact of head with the surface you are lying on the entire time.  Hold x2-3 seconds - effort only as far as feels good to do.  Perform x5-10 reps 2-3x/day    RETRACTION / CHIN TUCK    Slowly draw your head back so that your ears line up with your shoulders.  Hold x2-3 seconds - effort only as far as feels good to do.  Perform x5-10 reps 2-3x/day        CERVICAL FLEXION    Tilt your head downwards, then return back to looking straight ahead.  x5-10 reps - SLOW and SMALL amount  2-3x/day    CERVICAL ROTATION    Turn your head towards the side, then return back to looking straight ahead.  x5-10 reps - SLOW and SMALL amount  2-3x/day  Can move eyes with head or keep eyes straight forward focused on a spot on the wall to change dizziness.

## 2021-06-17 NOTE — TELEPHONE ENCOUNTER
Telephone Encounter by Bin Blood at 1/22/2021  5:35 PM     Author: Bin Blood Service: -- Author Type: Patient Access    Filed: 1/22/2021  5:39 PM Encounter Date: 1/22/2021 Status: Signed    : Bin Blood (Patient Access)       [place] APPROVED:    Approval start date: 12/23/20  Approval end date:  01/22/22    Pharmacy has been notified of approval and will contact patient when medication is ready for pickup.

## 2021-06-17 NOTE — PROGRESS NOTES
Patient presents to the clinic today for a follow up with Shannan Gagnon PA-C.     Pain score: 8  Constant   What does your pain feel like: ache, sharp at bedtime  Does the pain interfere with:  Work: no  Walking: yes  Sleep: yes  Daily activities: yes  Relationships: yes  F= 7

## 2021-06-18 NOTE — PATIENT INSTRUCTIONS - HE
Patient Instructions by Shannan Gagnon PA-C at 5/13/2021  3:00 PM     Author: Shannan Gagnon PA-C Service: -- Author Type: Physician Assistant    Filed: 5/13/2021  3:47 PM Date of Service: 5/13/2021  3:00 PM Status: Addendum    : Shannan Gagnon PA-C (Physician Assistant)    Related Notes: Original Note by Shannan Gagnon PA-C (Physician Assistant) filed at 5/13/2021  3:46 PM       Continue methadone 15 mg in the am and 10 mg at night   Check with your pharmacy that all prescriptions are on your profile. Call the pharmacy a week before you want to fill the prescription as stock may vary and the pharmacy may need to order the medication for you. I reserve the right to cancel the electronic prescription at the pharmacy in between appointments and would contact you with an explanation if this were to occur.  Refills sent for 05/17/2021 & 06/14/2021  Continue Endocet 10/325 mg 1-2 daily as needed for severe or breakthrough pain.  No refill needed today  Start Duloxetine trial for pain and mood - take 30 mg at bedtime x 7 days, then increase to 60 mg daily (may take 2 together or take 1 in am and 1 in pm).  Discussed this will take up to 4 weeks to see benefits for pain. Call nurse line with any concerns once started. Can also refer for psychotherapy or grief support groups as needed  Schedule with dermatologist for hair loss concerns  Schedule follow-up with Dr. Mccollum regarding ulnar neuropathy   Follow-up in 8 weeks with Shannan   Patient Education     Duloxetine Delayed Release Capsule 30 mg  Uses  This medicine is used for the following purposes:    anxiety    depression    eating disorders    muscle aches    pain  Instructions  Swallow the medicine without crushing or chewing it.  This medicine may be taken with or without food.  It is very important that you take the medicine at about the same time every day. It will work best if you do this.  Keep the medicine at room temperature. Avoid  heat and direct light.  It may take several weeks for this medicine to fully work.  It is important that you keep taking each dose of this medicine on time even if you are feeling well.  If you forget to take a dose on time, take it as soon as you remember. If it is almost time for the next dose, do not take the missed dose. Return to your normal dosing schedule. Do not take 2 doses of this medicine at one time.  Please tell your doctor and pharmacist about all the medicines you take. Include both prescription and over-the-counter medicines. Also tell them about any vitamins, herbal medicines, or anything else you take for your health.  Do not suddenly stop taking this medicine. Check with your doctor before stopping.  Cautions  Tell your doctor and pharmacist if you ever had an allergic reaction to a medicine. Symptoms of an allergic reaction can include trouble breathing, skin rash, itching, swelling, or severe dizziness.  Do not use the medication any more than instructed.  This medicine may cause dizziness or fainting, especially after exercising or in hot weather. Be very careful when standing or sitting up quickly.  Your ability to stay alert or to react quickly may be impaired by this medicine. Do not drive or operate machinery until you know how this medicine will affect you.  Please check with your doctor before drinking alcohol while on this medicine.  Family should check on the patient often. Call the doctor if patient becomes more depressed, has thoughts of suicide, or shows changes in behavior.  Call the doctor if there are any signs of confusion or unusual changes in behavior.  Tell the doctor or pharmacist if you are pregnant, planning to be pregnant, or breastfeeding.  Ask your pharmacist if this medicine can interact with any of your other medicines. Be sure to tell them about all the medicines you take.  Do not start or stop any other medicines without first speaking to your doctor or  pharmacist.  Do not share this medicine with anyone who has not been prescribed this medicine.  This medicine can cause serious side effects in some patients. Important information from the U[place] Food and Drug Administration (FDA) is available from your pharmacist. Please review it carefully with your pharmacist to understand the risks associated with this medicine.  Side Effects  The following is a list of some common side effects from this medicine. Please speak with your doctor about what you should do if you experience these or other side effects.    decreased appetite    constipation    dizziness    drowsiness or sedation    dry mouth    lack of energy and tiredness    nausea    stomach upset or abdominal pain    sweating    vomiting  Call your doctor or get medical help right away if you notice any of these more serious side effects:    agitated feeling or trouble sleeping    confusion    coughing up blood or vomit that looks like coffee grounds    fainting    hallucinations (unusual thoughts, seeing or hearing things that are not real)    rapid heartbeat    symptoms of liver damage (such as yellowing of skin or eyes, dark urine, unusual tiredness or weakness; severe stomach or back pain)    muscle aches, spasms or abnormal movements    muscle weakness    dilation of the pupils    seizures    problems with sexual functions or desire    shakiness    blood in stool    dark, tarry stool    blurring or changes of vision    severe or persistent vomiting  A few people may have an allergic reactions to this medicine. Symptoms can include difficulty breathing, skin rash, itching, swelling, or severe dizziness. If you notice any of these symptoms, seek medical help quickly.  Extra  Please speak with your doctor, nurse, or pharmacist if you have any questions about this medicine.  [email or web address]  IMPORTANT NOTE: This document tells you briefly how to take your medicine, but it does not tell  you all there is to know about it.Your doctor or pharmacist may give you other documents about your medicine. Please talk to them if you have any questions.Always follow their advice. There is a more complete description of this medicine available in English.Scan this code on your smartphone or tablet or use the web address below. You can also ask your pharmacist for a printout. If you have any questions, please ask your pharmacist.     [email or web address] [email or web address].

## 2021-06-21 NOTE — PROGRESS NOTES
Injection - Other  Date/Time: 11/20/2018 3:35 PM  Performed by: Aneesh Mahan MD  Authorized by: Aneesh Mahan MD   Comments:     TRIGGER POINT INJECTION LEFT TROCHANTERIC BURSITIS  Performed on: 11/20/2018    Ms. Noonan is a 73-year-old woman who has pain in the left lateral hip area.  She has tenderness over the greater trochanter.  She is referred today for trigger point injections over the left greater trochanteric bursitis.    Pre Procedure Diagnosis:  Myofascial pain  Vital Signs:  As per intra-procedure documentation    The procedure was discussed with Citlalli WALTON Saran in detail along with the attendant risks, including but not limited to: nerve injury, infection, bleeding, allergic reaction, or worsening of pain.  Informed consent was obtained and patient elected to proceed.    After informed consent was obtained the patient was placed in right lateral decubitus position on the examination table.  The left lateral hip area was prepped sterilely with alcohol.  A 3-1/2 inch #25-gauge needle was used.  There was an injection made in a fanlike distribution over the left greater trochanter.  A total of 10 mL of 0.25% Marcaine with 10 mg of Decadron was used in divided doses.    The patient tolerated the procedure well.  There were no complications.      Pre Procedure Pain Scale: 10  Pain Score: 0-No pain    If Citlalli Noonan has any questions or concerns after discharge, she was instructed to call us.

## 2021-06-22 NOTE — PROGRESS NOTES
PAIN CLINIC PROGRESS NOTE    Subjective:   Citlalli Noonan is a 73 y.o. female who presents for evaluation of low back pain.  She has lumbar degeneration, OA bilateral knees, and diabetic peripheral neuropathy.  She has continued low dose methadone as this helps pain but does have intermittent nausea/vomiting as a side effect.  Weaning off medication caused uncontrolled pain and poor sleep and trial of other extended release opioids did no help reduce neuropathic pain.  She was also to continue Endocet, topicals, and muscle relaxants.  She has been educated on lumbar sympathetic block for lower extremity neuropathy.    Major issues:  1. Chronic pain syndrome    2. Lumbar Disc Degeneration    3. Opioid Dependence With Continuous Use    4. Trochanteric bursitis of left hip       Pain Ratin/10 constant hurting left sided pain. Function rated 7.   At best, pain rated 6/10.  At worst pain rated 10/10 and on average pain rated 6-8/10.    Radiation of pain: left sided leg pain ongoing and chronic to left posterior calf, intense paresthesias  Paresthesias, numbness, weakness: bilateral leg/foot numbness and weakness improving  Gait disturbance: occasionally uses a cane, denies recent falls.   GI/: No constipation or urinary or stool incontinence  Exacerbating factors: Stress, activity including walking or standing too long, moving, hot/humid weather  Relieving factors: Pain improves with rest, medications, elevation of feet, lidoderm patches  Associated Symptoms: Numbness in left hip/thigh, night pain, fever/chills.  Denies weakness, bowel or bladder incontinence, unexplained weight loss.  Functional Symptoms: Pain interferes with sleep,walking, work, ADLs, relationships/social  Adverse effects of medications: Nausea, vomiting intermittently. Takes Phenergan and compazine.  Constipation uses senna or ducolax suppository or Miralax prn.  Current treatment efficacy: Fair.  States she is taking methadone 5 mg in am and  "15 mg at bedtime.  She requests to increase 10 mg in the am. Endocet 10/325 mg works well for breakthrough pain, doesn't take daily.  Current treatment compliance: Fair. Keeping follow-up appointments and taking medication as prescribed. No current PT, injections, BH.      She has history of resection of pituitary macroadenoma. She was recommended by Dr. Gresham to have MRI scan and she states she cannot do this unless she is \"knocked out.\"  She is attempting to get a open, seated MRI scheduled.  She is having right frontal headaches and some mornings waking with them.  She states sometimes there all day and sometimes goes away.  She states she usually doesn't have headaches so this scares her.  Phelgm and drainage is constant.  She continues to have nausea, vomiting.  She also reports dizziness, vision changes.      As far as pain, reports this as unchanged in location but slightly worse in severity due to colder weather.  She did take an endocet 10/325 mg tablet this morning, reports with colder weather has a lot more pain.      She reports her left hip bursa injection was helpful for 3 weeks duration. This was completed 11/20/18 with Dr. Mahan.  She wants to know if there is any way to get relief to last longer.  She is also to have MRI on hip and lower back which has not been scheduled yet.  This was ordered by Stacia Thrasher and she is unable to lay on her back and doesn't tolerate MRIs as she panics.  She was referred to [facility] for consults.  She states she has tried MRIs with oral sedation and won't do IV sedation due to cost unless she is inpatient.      Review of Systems  A 12 point ROS was completed and was negative except for above listed and ongoing nausea, intermittent vomiting.  Weight stable, denies fever.  Sleep interrupted due to stress and pain.       Objective:     Vitals:    12/27/18 1457   BP: 118/66   Pulse: 69   Resp: 16   Weight: 216 lb (98 kg)   Height: 5' 3\" (1.6 " m)   PainSc:   8     Physical Exam  General- patient is alert and oriented, in NAD, well-groomed, well-nourished.  Appears uncomfortable, Obese. Presents alone today.  Psych- Judgment and insight normal, AOx4, speech and thought process normal, recent and remote memory normal, mood and affect concerned but pleasant.  Respiratory- breathing is non-labored, normal rhythm and rate.  Cardiovascular- extremities warm and well perfused, no peripheral edema or varicosities  Spine- normal gross alignment, no focal tenderness on palpation, no spasms  Musculoskeletal- Able to sit to stand with difficulty.  Ambulates with antalgia independently. Tenderness in left greater trochanteric bursa.    *Opioid [place] Precautions:    UDS/Swab - Collected 10/30/18, results not processed as change from [facility] to [facility].  Recollected 12/27/18, results pending  Opioid Consent - 04/12/18  Opioid Agreement - 04/12/18  Pharmacy- as documented    [place]  Reviewed - 12/27/18 as expected.  Pill Count - 10/17/16 appropriate  Psychological evaluation - n/a  MME - up to 75  Pharmacogenetic testing - n/a  EKG reviewed from 8/20/18: NSR, QT/QTc: 374/420 ms  FAUSTINO Score: 50 12/27/18    Assessment:   Citlalli Noonan is a 73 y.o. female seen in clinic today for lower extremity pain secondary to diabetic peripheral neuropathy, lumbar pain due to degeneration and left leg radiculopathy treated with methadone.  She is also having left hip pain secondary to left hip bursitis versus other degenerative changes and she is pending lumbar and hip MRIs but trying to find an open MRI location.  She is requesting to have increase in morning dose of methadone as her pain levels have been more severe with colder weather and she doesn't like taking Endocet regularly.  This would increase methadone MME from 60 to 75 and total max daily MME 90 which we discuss is the higher limit of CDC guidelines.  She has failed Lyrica recently.  She cannot afford CBD oil or  cannabis trial. Discuss trial of acupuncture for chronic pain areas and she is interested if insurance will cover, which she will have to check on.    Plan:   Take methadone 10 mg in am and 15 mg at bedtime.    As your opioid dose remains stable, I will send electronic refills to the pharmacy for 2 subsequent months until your next appointment so you do not have to call our refill line.  The fill dates for your medications are:  12/27/18 & 01/26/19  Check with your pharmacy that all prescriptions are on your profile. Call the pharmacy a week before you want to fill the prescription as stock may vary and the pharmacy may need to order the medication for you. I reserve the right to cancel the electronic prescription at the pharmacy in between appointments and would contact you with an explanation if this were to occur.  Continue Endocet 10/325 mg 1-2 daily as needed for severe or breakthrough pain.  No refill needed today  Repeat left hip bursa injection with Dr. Mahan  Diagnostics: UDT/SWAB collected today results are pending.  Patient required a random Urine Drug Test due to the need to comply with [facility] Model Policy Guidelines and CDC Guideline for the use of any controlled substances. Reasons for definitive testing include:  -Identify specific medication(s) or drug(s) in a class (e.g. Benzodiazepines, barbiturates, opioids, antidepressants)  -Definitive concentration of medication(s), drug(s), and/or metabolites needed to guide management  -Identify non-prescribed medication or illicit drug use for ongoing safe prescribing of controlled substances  -Identify substances that may present high risk for additive or synergistic interaction with prescription medication (e.g. Alcohol).  Patient is either being considered for or taking a controlled substance. Unexpected findings will be discussed and treatment decision may be adjusted. Testing is being implemented across the board randomly w/o bias related to age,  race, gender, socioeconomic status or Mu-ism affiliation.   Recommend making appointments for lower back and hip as referred; you can ask specialists if they can accept open MRI or CT scan prior to visit.  Recommend acupuncture visits here with Alyssa Gant LAc.  Referral placed today. Please check with insurance if you have coverage for these visits and how many sessions are covered per calendar year.  Follow-up in 8 weeks with Shannan Gagnon PA-C  [facility]  [address]  [place] [address]  Ph: [phone]  Fax: [phone]

## 2021-06-23 NOTE — PATIENT INSTRUCTIONS - HE
POST PROCEDURE INSTRUCTIONS  PROCEDURE DONE TODAY:TRIGGER POINT INJECTION    Rest today. Resume activity tomorrow as able.  Patient demonstrates the ability to ambulate independently with a steady gait at the time of discharge.      It is not unusual to have a temporary increase in your pain after a procedure. You can ice the area 24-48 hrs. 15 min at a time.      You received a steroid injection. This medication can take 2-7 days to take effect. Some temporary side effects include:    Heartburn    Flushed-red face    Insomnia-trouble sleeping-jitters    Diabetics may see a rise in blood sugar for a few days    Low back or SI joint (sacroiliac) injection: you may experience numbness in one or both legs. Please walk with help. This is temporary and will wear off in a few hours. Do not drive if you are tingling, numbness or weakness in your hands/arms or legs/feet.    Headache:  If you experience a headache after a lumbar epidural injection, lie down and drink fluids (especially caffeine). The headache will go away gradually. If it persists notify our clinic.    Fever: call if fever 100 or over, redness/warmth/swelling over the injection site.    No public hot tubs/pools for a couple days    Physical therapy: check with pain center provider regarding resuming therapy.    Monitor your response to the injection and report this at your next visit.    If you have been referred for a procedure only, please call your referring provider for a follow up.    IF YOU PLAN TO GET A FLU SHOT YOU MUST WAIT 2 WEEKS AFTER THIS INJECTION.      CALL IF ANY QUESTIONS [phone]

## 2021-06-23 NOTE — PROGRESS NOTES
Injection - Other  Date/Time: 1/16/2019 2:45 PM  Performed by: Aneesh Mahan MD  Authorized by: Aneesh Mahan MD   Comments:     TRIGGER POINT INJECTION  Performed on: 1/16/2019    Ms. Noonan is a 73-year-old woman with pain in the left hip area.  She has had trigger point injections in the greater trochanteric bursa before which have given her some significant relief.  She wishes to repeat that again today.    Pre Procedure Diagnosis:  Myofascial pain  Vital Signs:  As per intra-procedure documentation    The procedure was discussed with Citlalli Noonan in detail along with the attendant risks, including but not limited to: nerve injury, infection, bleeding, allergic reaction, or worsening of pain.  Informed consent was obtained and patient elected to proceed.    After informed consent was obtained the patient was placed in a right lateral decubitus position on the examination table.  The left lateral hip area was prepped sterilely with alcohol.  A 3-1/2 inch #25-gauge Quincke spinal needle was used.  The left lateral hip area was prepped sterilely with alcohol.  Injections were made in a fanlike distribution over the left greater trochanteric area.  A total of 10 mL of 0.25% Marcaine with 10 mg of Decadron was used in divided doses.    The patient tolerated the procedure well.  There were no complications.      Pre Procedure Pain Scale: 9  Pain Score: 0-No pain    If Citlalli Noonan has any questions or concerns after discharge, she was instructed to call us.

## 2021-06-24 NOTE — TELEPHONE ENCOUNTER
Central PA team  [phone]  Pool: HE PA MED ([id number])          PA has been initiated.       PA form completed and faxed insurance via Cover My Meds     Key:  [id number]      Medication:  Methadone HCl 5MG OR TABS      Insurance:  [employer]           Response will be received via fax and may take up to 5-10 business days depending on plan

## 2021-06-24 NOTE — TELEPHONE ENCOUNTER
Pharmacy does not have enough Methadone for full refill.     I spoke with the Pharmacy. They can FILL rx FOR #57 Methadone. I will cue another rx for remainder. Send to [facility].

## 2021-06-24 NOTE — PROGRESS NOTES
PAIN CLINIC PROGRESS NOTE    Subjective:   Citlalli Noonan is a 73 y.o. female who presents for evaluation of low back pain.  She has lumbar degeneration, OA bilateral knees, and diabetic peripheral neuropathy.  She has continued low dose methadone as this helps pain but does have intermittent nausea/vomiting as a side effect.  Weaning off medication caused uncontrolled pain and poor sleep and trial of other extended release opioids did no help reduce neuropathic pain.  She was also to continue Endocet, topicals, and muscle relaxants.  She has been educated on lumbar sympathetic block for lower extremity neuropathy.    Major issues:  1. Lumbar Disc Degeneration    2. Chronic pain syndrome    3. Opioid Dependence With Continuous Use    4. Diabetic peripheral neuropathy (H)    5. Hip pain, left    6. Neck pain       Pain Ratin/10 constant aching left sided pain. Function rated 7.   At best, pain rated 6/10.  At worst pain rated 10/10 and on average pain rated 6-8/10.    Radiation of pain: left sided leg pain ongoing and chronic to left posterior calf  Gait disturbance: occasionally uses a cane, denies recent falls.   Exacerbating factors: Stress, activity including walking or standing too long, moving  Relieving factors: Pain improves with rest, medications, elevation of feet, lidoderm patches  Associated Symptoms: Numbness in left hip/thigh, weakness in left leg, night pain, fever/chills.  Denies bowel or bladder incontinence, unexplained weight loss.   Functional Symptoms: Pain interferes with sleep, walking, work, ADLs, mood (frustrated).  Adverse effects of medications: Nausea, vomiting intermittently. Takes Phenergan and compazine.  Constipation uses senna or ducolax suppository or Miralax prn.  Current treatment efficacy: Fair.  States she is taking methadone 10 mg in am and 15 mg at bedtime which is more helpful, sleeping better at night up to 8 hours.  Endocet 10/325 mg works well for breakthrough pain,  "doesn't take daily.  Current treatment compliance: Fair. Keeping follow-up appointments and taking medication as prescribed. No current PT, injections, BH.      She has history of resection of pituitary macroadenoma. She was recommended by Dr. Gresham to have MRI scan and she states she cannot do this unless she is \"knocked out.\"  She is attempting to get a open, seated MRI scheduled.  She is having right frontal headaches and some mornings waking with them.  She states sometimes there all day and sometimes goes away. She continues to have nausea, vomiting.  She also reports dizziness, vision changes.      She saw ENT yesterday:  \"IMPRESSION:    ICD-10-CM   1. Sensation of foreign body in throat R09.89 21749 Laryngscpy Flexible Fiberoptic; Dx   2. Pharyngitis, unspecified etiology J02.9 66652 Laryngscpy Flexible Fiberoptic; Dx   3. PND (post-nasal drip) R09.82 54293 Laryngscpy Flexible Fiberoptic; Dx   4. Hoarseness of voice R49.0 77304 Laryngscpy Flexible Fiberoptic; Dx     PLAN:    I advised the patient that she does have a large septal perforation. This is going to create a lot of congestion because of the turbulent airflow. This will dry out a lot of mucus and that will lead to more thick postnasal drainage. I advised her to use sinus irrigation twice a day. At nighttime before she goes to bed I advised her to put Vaseline to the nose which will act as a barrier.  There is no mass or lesions are I reassured the patient.  I advised her to increase her fluid intake and that should help with the postnasal drainage and the pharyngitis as well. There is no mass or lesions.    She agrees with the plan    Leticia Molina MD 2/21/2019, 1:49 PM\"    Hasn't started Victoza yet as she is finishing her supply of other medications.     She reports she has \"arthritis in her neck\" and she states that is hurting more.  She states some days difficult to turn her head right or left.  She has occipital headaches with pain.  Denies " "cervical spine surgery or history of cervical procedures.  Chart review she had CT of cervical spine on 08/28/18:  CONCLUSION:  1.  No evidence of fracture.  2.  At C4-C5, there is moderate spinal canal stenosis and mild bilateral neural  foraminal stenosis.  3.  At C5-C6, there is moderate spinal canal stenosis and moderate left neural  foraminal stenosis.  4.  At C6-C7, there is moderate spinal canal stenosis and mild to moderate left  neural foraminal stenosis.  5.  Additional degenerative changes as above.  6.  Hypodense 1.3 cm right thyroid nodule, enlarged since 11/13/2013. A biopsy  of the right thyroid lobe has previously been performed. Recommend correlation  with the clinical history and ultrasound evaluation, as clinically appropriate.    She states she had left hip bursa injection on 01/16/19 she had initially pain reduction 9/10 until 0/10.  She states she didn't have long term relief as once her numbing med wore off her pain levels returned.  She states she felt fine the day after procedure but then had vomiting on 1/18/19 which she states was all day and she did not have elevated blood sugars.  She is unsure if this was a side effect of steroid.  She plans to follow-up with imaging of her left hip ordered by PCP to further evaluate.    Review of Systems  A 12 point ROS was completed and was negative except for above listed and ongoing nausea, intermittent vomiting.  Weight stable, denies fever.  Sleep interrupted due to stress and pain.       Objective:     Vitals:    02/22/19 1436   BP: 115/62   Pulse: 71   Resp: 16   Weight: 217 lb (98.4 kg)   Height: 5' 5\" (1.651 m)   PainSc:   7     Physical Exam  General- patient is alert and oriented, in NAD, well-groomed, well-nourished.  Appears uncomfortable, Obese. Presents alone today.  Psych- Judgment and insight normal, AOx4, speech and thought process normal, recent and remote memory normal, mood and affect concerned but pleasant.  Respiratory- breathing " is non-labored, normal rhythm and rate.  Cardiovascular- extremities warm and well perfused, no peripheral edema or varicosities  Spine- normal gross alignment, no focal tenderness on palpation, no spasms  Musculoskeletal- Able to sit to stand with difficulty.  Ambulates with antalgia independently.     *Opioid [place] Precautions:    UDS/Swab - Reviewed 12/27/18, results as expected  Opioid Consent - 04/12/18  Opioid Agreement - 04/12/18  Pharmacy- as documented    [place]  Reviewed - 02/22/19 as expected.  Pill Count - 10/17/16 appropriate  Psychological evaluation - n/a  MME - up to 75  Pharmacogenetic testing - n/a  EKG reviewed from 8/20/18: NSR, QT/QTc: 374/420 ms  FAUSTINO 12/27/18    Assessment:   Citlalli Noonan is a 73 y.o. female seen in clinic today for lower extremity pain secondary to diabetic peripheral neuropathy, cervical pain with CT scan demonstrating multiple levels of moderate stenosis and degeneration, lumbar pain due to degeneration and left leg radiculopathy treated with methadone.  She is also having left hip pain secondary to left hip bursitis versus other degenerative changes and she is pending lumbar and hip MRIs but trying to find an open MRI location.  She is having better pain control this month with adjustment of methadone, will continue at current dosing.  Discuss trial of acupuncture for chronic pain areas and she is interested if insurance will cover, which she will have to check on.      Plan:   Take methadone 10 mg in am and 15 mg at bedtime.    As your opioid dose remains stable, I will send electronic refills to the pharmacy for 2 subsequent months until your next appointment so you do not have to call our refill line.  The fill dates for your medications are:  02/25/19 & 03/27/19  Check with your pharmacy that all prescriptions are on your profile. Call the pharmacy a week before you want to fill the prescription as stock may vary and the pharmacy may need to order the medication  for you. I reserve the right to cancel the electronic prescription at the pharmacy in between appointments and would contact you with an explanation if this were to occur.  Continue Endocet 10/325 mg 1-2 daily as needed for severe or breakthrough pain.  No refill needed today  Recommend making appointments for lower back and hip as referred; you can ask specialists if they can accept open MRI or CT scan prior to visit.  Recommend acupuncture visits here with Alyssa Gant LAc.  Referral placed last visit. Please check with insurance if you have coverage for these visits and how many sessions are covered per calendar year.  For neck pain, trial Ibuprofen 800 mg 1 tablet with food 3 times a day for 1-2 weeks to see if helpful.  Follow-up in 8 weeks with Shannan Gagnon PA-C  [facility]  [address]  [place] [address]  Ph: [phone]  Fax: [phone]

## 2021-06-24 NOTE — TELEPHONE ENCOUNTER
Prior Authorization Request  Who s requesting:  Shannan Gagnon PA-C/ Rosalba Horan CMA  Pharmacy Name and Location: [facility]  Medication Name: Methadone 5mg  Insurance Plan: Medicare  Insurance Member ID Number:  [id number]  Informed patient that prior authorizations can take up to 10 business days for response:   Yes  Okay to leave a detailed message: Yes

## 2021-06-24 NOTE — PATIENT INSTRUCTIONS - HE
Take methadone 10 mg in am and 15 mg at bedtime.    As your opioid dose remains stable, I will send electronic refills to the pharmacy for 2 subsequent months until your next appointment so you do not have to call our refill line.  The fill dates for your medications are:  02/25/19 & 03/27/19  Check with your pharmacy that all prescriptions are on your profile. Call the pharmacy a week before you want to fill the prescription as stock may vary and the pharmacy may need to order the medication for you. I reserve the right to cancel the electronic prescription at the pharmacy in between appointments and would contact you with an explanation if this were to occur.  Continue Endocet 10/325 mg 1-2 daily as needed for severe or breakthrough pain.  No refill needed today  Recommend making appointments for lower back and hip as referred; you can ask specialists if they can accept open MRI or CT scan prior to visit.  Recommend acupuncture visits here with Alyssa Gant LAc.  Referral placed last visit. Please check with insurance if you have coverage for these visits and how many sessions are covered per calendar year.  For neck pain, trial Ibuprofen 800 mg 1 tablet with food 3 times a day for 1-2 weeks to see if helpful.  Follow-up in 8 weeks with Shannan

## 2021-06-25 NOTE — TELEPHONE ENCOUNTER
Refill request: ibuprofen  Last ov with RW: 5/13/21  Follow up in 8 weeks  Next ov with RW: 6/30/21  Last prescribed 3/10/20    Refill refused and patient will need to call the clinic.

## 2021-06-26 NOTE — PROGRESS NOTES
Progress Notes by Shannan Gagnon PA-C at 10/30/2018  2:36 PM     Author: Shannan Gagnon PA-C Service: -- Author Type: Physician Assistant    Filed: 10/30/2018  5:05 PM Date of Service: 10/30/2018  2:36 PM Status: Signed    : Shannan Gagnon PA-C (Physician Assistant)       PAIN CLINIC PROGRESS NOTE    Subjective:   Citlalli Noonan is a 73 y.o. female who presents for evaluation of low back pain.  She has lumbar degeneration, OA bilateral knees, and diabetic peripheral neuropathy.  She has continued low dose methadone as this helps pain but does have intermittent nausea/vomiting as a side effect.  Weaning off medication caused uncontrolled pain and poor sleep and trial of other extended release opioids did no help reduce neuropathic pain.  She was also to continue Endocet, topicals, and muscle relaxants.  She has been educated on lumbar sympathetic block for lower extremity neuropathy.    Major issues:  1. Chronic, continuous use of opioids    2. Chronic pain syndrome    3. Lumbar Disc Degeneration    4. Diabetic peripheral neuropathy (H)       Pain Ratin/10 constant aching left sided pain. Function rated 8.   At best, pain rated 6/10.  At worst pain rated 7-8/10 and on average pain rated 7-8/10.    Radiation of pain: left sided leg pain ongoing and chronic to left posterior calf, intense paresthesias  Paresthesias, numbness, weakness: bilateral leg/foot numbness and weakness improving  Gait disturbance: occasionally uses a cane, denies recent falls.   GI/: No constipation or urinary or stool incontinence  Exacerbating factors: Stress, activity including walking or standing too long, moving, hot/humid weather  Relieving factors: Pain improves with rest, medications, elevation of feet, lidoderm patches  Associated Symptoms: Numbness in left hip/thigh, night pain, fever/chills.  Denies weakness, bowel or bladder incontinence, unexplained weight loss.  Functional Symptoms: Pain interferes  "with sleep,walking, work, ADLs, relationships/social  Adverse effects of medications: Nausea, vomiting intermittently. Takes Phenergan and compazine.  Constipation uses senna or ducolax suppository or Miralax prn.  Current treatment efficacy: Fair.  States she is taking methadone 5 mg in am and pm and 10 mg at bedtime. Endocet 10/325 mg works well for breakthrough pain, doesn't take daily.  Current treatment compliance: Fair. Keeping follow-up appointments and taking medication as prescribed. No current PT, injections, BH.      Since last visit she had CT scan head and she states radiologist and Dr. Parmar are in disagreement about results. She has history of resection of pituitary macroadenoma. She was recommended by Dr. Gresham to have MRI scan and she states she cannot do this unless she is \"knocked out.\"  She is attempting to get a open, seated MRI scheduled.  She is having right frontal headaches and some mornings waking with them.  She states sometimes there all day and sometimes goes away.  She states she usually doesn't have headaches so this scares her.  She also reports dizziness, vision changes.  States she is having vertigo with turning.  She had hair biopsy follicles through Dr. Tran Dermatologist as she is diagnosed with mild alopecia and started prenatal vitamins.    As far as pain, reports this as unchanged in location but slightly worse in severity due to colder weather.  She did take an endocet 10/325 mg tablet this morning, reports with colder weather has a lot more pain.  She state she is taking in morning and at night before bedtime.  Has #10 tabs left.  Requests a refill.  Of note, last refill was 12/2017 for #60 tabs she takes sparingly.    She reports her left hip bursitis has reoccurred over the past 2 months without new injury or trauma and she feels it is numb and burning in that area.  Painful to the touch/pressure and she reports she would like to have a repeat bursa injection.  " "This was last done over 3 years ago and she would like to repeat as she tolerated well.    She reports she stopped the Lyrica on 07/27/18 as her neuropathy was worse on medication.  She states she increased as directed but 75 mg three times a day was too dizzy and off balance so she reduced to two times a day dosing and didn't like that either and felt more nauseated than normal and reduced back to 75 mg 1 at bedtime for over 1.5 months.  She reports she gained 10 pounds on it and \"all she could think about was food.\"  She stated her feet were also swelling from the medication and her neuropathy was worse than normal by 20x.  She didn't walk for a week as her feet were so sore.  She states since discontinuation her neuropathy pain has still felt better.  She states her neuropathy is almost gone now and her feet haven't felt this good in years.    Review of Systems  A 12 point ROS was completed and was negative except for above listed and ongoing nausea, intermittent vomiting.  Weight stable, denies fever.  Sleep interrupted due to stress and pain.       Objective:     Vitals:    10/30/18 1455   BP: 118/68   Pulse: 70   Resp: 16   Weight: 219 lb (99.3 kg)   Height: 5' 3\" (1.6 m)   PainSc:   8     Physical Exam  General- patient is alert and oriented, in NAD, well-groomed, well-nourished.  Appears comfortable, Obese. Presents alone today.  Psych- Judgment and insight normal, AOx4, speech and thought process normal, recent and remote memory normal, mood and affect concerned but pleasant.  Respiratory- breathing is non-labored, normal rhythm and rate.  Cardiovascular- extremities warm and well perfused, no peripheral edema or varicosities  Spine- normal gross alignment, no focal tenderness on palpation, no spasms  Musculoskeletal- Able to sit to stand with difficulty.  Ambulates with antalgia independently. Tenderness in left greater trochanteric bursa.    *Opioid [place] Precautions:    UDS/Swab - Collected 10/30/18, " results pending  Opioid Consent - 04/12/18  Opioid Agreement - 04/12/18  Pharmacy- as documented    [place]  Reviewed - 10/30/18 as expected.    Pill Count - 10/17/16 appropriate  Psychological evaluation - n/a  MME - up to 75  Pharmacogenetic testing - n/a  EKG reviewed from 8/20/18: NSR, QT/QTc: 374/420 ms  FAUSTINO 06/07/18    Assessment:   Citlalli Noonan is a 73 y.o. female seen in clinic today for lower extremity pain secondary to diabetic peripheral neuropathy which she reports was increase with Lyrica for 1.5 months but after Lyrica was discontinued her neuropathy pain is much better.  I am unable to explain this but will monitor.  She has lumbar pain due to degeneration and left leg radiculopathy treated with methadone.  She is having CT/MRI scans to evaluate headaches in the setting of pituitary adenoma resection.  She is reporting left hip bursitis recommended to have bursa injection, discuss possible risks and benefits of this treatment.    Plan:   Continue methadone 5 mg every 6 hours.    As your opioid dose remains stable, I will send electronic refills to the pharmacy for 2 subsequent months until your next appointment so you do not have to call our refill line.  The fill dates for your medications are:  10/30/18 & 11/27/18  Check with your pharmacy that all prescriptions are on your profile. Call the pharmacy a week before you want to fill the prescription as stock may vary and the pharmacy may need to order the medication for you. I reserve the right to cancel the electronic prescription at the pharmacy in between appointments and would contact you with an explanation if this were to occur.  Continue Endocet 10/325 mg 1-2 daily as needed for severe or breakthrough pain - refill sent today.   Keep working with neurosurgeon on head MRI and recommendations.  Repeat left hip bursa injection with Dr. Mahan at least 2 weeks after flu vaccine tomorrow.  Diagnostics: UDT/SWAB collected today results are  pending.  Patient required a random Urine Drug Test due to the need to comply with [facility] Model Policy Guidelines and CDC Guideline for the use of any controlled substances. Reasons for definitive testing include:  -Identify specific medication(s) or drug(s) in a class (e.g. Benzodiazepines, barbiturates, opioids, antidepressants)  -Definitive concentration of medication(s), drug(s), and/or metabolites needed to guide management  -Identify non-prescribed medication or illicit drug use for ongoing safe prescribing of controlled substances  -Identify substances that may present high risk for additive or synergistic interaction with prescription medication (e.g. Alcohol).  Patient is either being considered for or taking a controlled substance. Unexpected findings will be discussed and treatment decision may be adjusted. Testing is being implemented across the board randomly w/o bias related to age, race, gender, socioeconomic status or Rastafarian affiliation.   Follow-up in 8 weeks with Shannan Gagnon PA-C  [facility]  [address]  [place] [address]  Ph: [phone]  Fax: [phone]

## 2021-06-26 NOTE — PROGRESS NOTES
Progress Notes by Shannan Gagnon PA-C at 2018  2:54 PM     Author: Shannan Gagnon PA-C Service: -- Author Type: Physician Assistant    Filed: 2018  5:11 PM Date of Service: 2018  2:54 PM Status: Signed    : Shannan Gagnon PA-C (Physician Assistant)       PAIN CLINIC PROGRESS NOTE    Subjective:   Citlalli Noonan is a 72 y.o. female who presents for evaluation of low back pain.  She has lumbar degeneration, OA bilateral knees, and diabetic peripheral neuropathy.  She has continued low dose methadone as this helps pain but does have intermittent nausea/vomiting as a side effect.  Weaning off medication caused uncontrolled pain and poor sleep and trial of other extended release opioids did no help reduce neuropathic pain.  She was also to continue Endocet, topicals, and muscle relaxants.  She has been educated on lumbar sympathetic block for lower extremity neuropathy.    Major issues:  1. Chronic pain syndrome    2. Diabetic peripheral neuropathy    3. Opioid dependence, continuous    4. Lumbar Disc Degeneration       Pain Ratin/10 constant aching bilateral leg pain, feet pain. Function rated 6.   Last visit pain rated 8/10.  States neuropathy in lower extremities is worse.  At best, pain rated 6/10 and at worst pain rated 9/10 and average pain rated 7-8/10.  Radiation of pain: left sided leg pain ongoing and chronic to left posterior calf, intense paresthesias  Paresthesias, numbness, weakness: hand numbness.    Gait disturbance: occasionally uses a cane, denies recent falls.   GI/: No constipation or urinary or stool incontinence  Exacerbating factors: Stress, activity including walking or standing too long, moving  Relieving factors: Pain improves with rest, medications, elevation of feet, lidoderm patches.  Associated Symptoms: Numbness in bilateral feet and left hip, weakness, night pain, fever/chills.  Denies bowel or bladder incontinence, unexplained weight  loss.  Functional Symptoms: Pain interferes with sleep,walking, work, ADLs, relationships/social, mood (frustrated).  Adverse effects of medications: Nausea, vomiting intermittently. Takes Phenergan and compazine.  Constipation uses senna or Miralax but trying not to take every day, BM every 2-3 days.  Current treatment efficacy: Fair.  States she is taking methadone 5 mg every 6-8 hours. Endocet 10/325 mg works well for breakthrough pain, doesn't take daily.  Current treatment compliance: Fair. Keeping follow-up appointments and taking medication as prescribed. No current PT, injections, BH.      She denies any anticoagulant medication use, denies visits to ED/urgent care since last seen, denies new medications.  She has viral illness last 3 weeks, sinus pressure and congestion, coughing up phlegm and reports her mucus is yellow.  She denies fever/chills.  She had episode like this in November and treated with antibiotic.  She does see ENT as she has coughed up blood and will do an endoscopy scheduled 4/19.  Went to ED on 04/01/18 for sore throat.  She reports pain symptoms are worsened due to acute pain.    She was educated and recommended on Lyrica trial last visit and has completed paperwork so she is able to apply to financial assistance program for Pf[employer].  She found out copay cost through [employer] and she isn't sure she would be able to afford it.    Will see orthopedics 04/25/18 for left hip pain.    XR HIP 2 VIEWS WO PELVIS LEFT  8/15/2017 3:56 PM    INDICATION:  Bilateral Hip Pain Bilateral Hip Pain  COMPARISON: None.    FINDINGS: There is minor degenerative change in the left hip with slight central loss of articular joint space. There are no acute abnormalities identified. The left hemipelvis visualized is intact.    IMPRESSION:    Minor degenerative changes in the left hip, otherwise negative.  She states some days difficult to walk.  She has had hip bursa injections in the past which worked for a week and  "then pain returned.  She denies hip PT.  She is not taking OTC medications, using heat/ice, or topicals for pain.     Review of Systems  A 12 point ROS was completed and was negative except for above listed and ongoing nausea, intermittent vomiting.  Weight stable, denies fever.  Sleep interrupted due to stress and pain.       Objective:     Vitals:    04/12/18 1459   BP: 132/57   Pulse: 73   Weight: 213 lb (96.6 kg)   Height: 5' 5\" (1.651 m)   PainSc:   7   PainLoc: Leg     Physical Exam  General- patient is alert and oriented, in NAD, well-groomed, well-nourished.  Appears comfortable, Obese. Presents alone today.  Psych- Judgment and insight normal, AOx4, speech and thought process normal, recent and remote memory normal, mood and affect concerned but pleasant.  Respiratory- breathing is non-labored, normal rhythm and rate.  Cardiovascular- extremities warm and well perfused, no peripheral edema or varicosities  Spine- normal gross alignment, no focal tenderness on palpation, no spasms  Musculoskeletal- Able to sit to stand with difficulty.  Ambulates with antalgia independently.    *Opioid [place] Precautions:    UDS/Swab - 10/19/17, results as expected  Opioid Consent -  due  Opioid Agreement - 03/23/17  Pharmacy- as documented    [place]  Reviewed - 04/12/18 as expected.    Pill Count - 10/17/16 appropriate  Psychological evaluation - n/a  MME - up to 75  Pharmacogenetic testing - n/a  EKG 04/03/17 Normal sinus rhythm QT/QTc 378/413 ms  FAUSTINO 12/21/17     Assessment:   Citlalli Noonan is a 72 y.o. female seen in clinic today for lower extremity pain secondary to diabetic peripheral neuropathy and lumbar pain due to degeneration and left leg radiculopathy treated with methadone.  She is reporting increased neuropathy pain in lower extremities.  I do not advise to increase her methadone per CDC guidelines and also we have discussed the concept of tolerance on opioids.  She is educated on neuropathic medications, " she has previously failed Gabapentin due to negative effects on blood sugars and Amitriptyline as it wasn't effective and she is recommended to start Lyrica for diabetic peripheral neuropathy.  She will apply for financial assistance for this medication.  She has also started CBD oils and is encouraged to continue as she has noticed some improvement in her neuropathy pain.  She states she cannot afford the medical cannabis program at this time.   She is having increase in left hip pain, pending consult with orthopedics next week.  X-ray demonstrated mild DJD.  She states she cannot take oral NSAIDS due to stomach upset.      Plan:   Continue methadone 5 mg every 6 hours.    As your opioid dose remains stable, I will send electronic refills to the pharmacy for 2 subsequent months until your next appointment so you do not have to call our refill line.  The fill dates for your medications are:  04/17/18 & 05/15/18  Check with your pharmacy that all prescriptions are on your profile. Call the pharmacy a week before you want to fill the prescription as stock may vary and the pharmacy may need to order the medication for you. I reserve the right to cancel the electronic prescription at the pharmacy in between appointments and would contact you with an explanation if this were to occur.  Continue Endocet 10/325 mg 1-2 daily as needed for severe or breakthrough pain - no refill needed today.   Discussed applying for [employer] financial assistance form to trial Lyrica; form faxed today.  Start Lyrica 75 mg 1 capsule at bedtime x 3 days, then increase to 75 mg twice a day x 3 days, then 75 mg 3 times a day.  Medication information given today  Orthopedic evaluation 04/25/18  Follow-up in 8 weeks with Shannan Gagnon PA-C  [facility]  [address]  [place] [address]  Ph: [phone]  Fax: [phone]

## 2021-06-26 NOTE — PROGRESS NOTES
Progress Notes by Shannan Gagnon PA-C at 2018  8:51 AM     Author: Shannan Gagnon PA-C Service: -- Author Type: Physician Assistant    Filed: 2018  1:40 PM Date of Service: 2018  8:51 AM Status: Signed    : Shannan Gagnon PA-C (Physician Assistant)       PAIN CLINIC PROGRESS NOTE    Subjective:   Citlalli Noonan is a 72 y.o. female who presents for evaluation of low back pain.  She has lumbar degeneration, OA bilateral knees, and diabetic peripheral neuropathy.  She has continued low dose methadone as this helps pain but does have intermittent nausea/vomiting as a side effect.  Weaning off medication caused uncontrolled pain and poor sleep and trial of other extended release opioids did no help reduce neuropathic pain.  She was also to continue Endocet, topicals, and muscle relaxants.  She has been educated on lumbar sympathetic block for lower extremity neuropathy.    Major issues:  1. Diabetic peripheral neuropathy (H)    2. Chronic pain syndrome    3. Chronic, continuous use of opioids    4. Lumbar Disc Degeneration       Pain Ratin/10 constant intense left sided pain. Function rated 8.   At best, pain rated 4/10.  At worst pain rated 1010 and on average pain rated 7/10.    Radiation of pain: left sided leg pain ongoing and chronic to left posterior calf, intense paresthesias  Paresthesias, numbness, weakness: bilateral leg/foot numbness and weakness improving  Gait disturbance: occasionally uses a cane, denies recent falls.   GI/: No constipation or urinary or stool incontinence  Exacerbating factors: Stress, activity including walking or standing too long, moving, hot/humid weather  Relieving factors: Pain improves with rest, medications, elevation of feet, lidoderm patches.  Associated Symptoms: Numbness in bilateral feet and left hip, weakness, night pain, fever/chills.  Denies bowel or bladder incontinence, unexplained weight loss.  Functional Symptoms: Pain  interferes with sleep,walking, work, relationships/social  Adverse effects of medications: Nausea, vomiting intermittently. Takes Phenergan and compazine.  Constipation uses senna or Miralax but trying not to take every day, BM every 2-3 days.  Current treatment efficacy: Fair.  States she is taking methadone 5 mg in am and pm and 10 mg at bedtime. Endocet 10/325 mg works well for breakthrough pain, doesn't take daily.  Current treatment compliance: Fair. Keeping follow-up appointments and taking medication as prescribed. No current PT, injections, BH.      Since last visit she has had several health appointments.    She saw Stacia Thrasher NP on 08/20/18 for medicare annual:   ASSESSMENT/PLAN:  ICD-10-CM   1. Medicare annual wellness visit, subsequent Z00.00   2. S/P transsphenoidal hypophysectomy () E89.3 CT HEAD BRAIN PITUITARY WWO   3. Partial hypopituitarism () E23.0 CT HEAD BRAIN PITUITARY WWO   4. Displacement of lumbar intervertebral disc without myelopathy M51.26   5. Morbid obesity due to excess calories () E66.01   6. Laryngopharyngeal reflux K21.9   7. Adenomatous polyp of ascending colon D12.2   8. Hyperlipidemia E78.2   9. Diabetic polyneuropathy associated with type 2 diabetes mellitus (HC) E11.42   10. Venous insufficiency I87.2   11. Chronic pain of both knees M25.561   M25.562   G89.29   12. Peripheral vascular disease (HC) I73.9   13. Type 2 diabetes mellitus with diabetic polyneuropathy, with long-term current use of insulin (HC) E11.42 HEMOGLOBIN A1C MONITORING (POCT)   Z79.4 HEMOGLOBIN A1C MONITORING (POCT)   14. Restless legs G25.81   15. Colloid thyroid nodule E04.1   16. Pituitary macroadenoma (HC) D35.2 CT HEAD BRAIN PITUITARY WWO   17. Essential hypertension I10   18. Acquired hypothyroidism E03.9   19. Anxiety F41.9   20. Obstructive sleep apnea G47.33   21. Kidney Stones - Left N20.0   22. Fatty liver K76.0   23. Seasonal allergies J30.2   24. Osteoarthritis of spine with  "radiculopathy, cervical region M47.22 CT SPINE CERVICAL WO   25. Long term current use of opiate analgesic Z79.891 EKG 12 LEAD UNIT PERFORMED   26. Chronic nausea R11.0 AMB CONSULT TO GASTROENTEROLOGY   27. Diaphoresis R61 AMB CONSULT TO INTERNAL MEDICINE     Ms. Noonan's Body mass index is 38.36 kg/m . This is out of the normal range for a 72 y.o. Normal range for ages 18+ is between 18.5 and 24.9. To lose weight we reviewed risks and benefits of appropriate options such as diet, exercise, and medications. Patient's strategy will be self-directed nutrition plan and self-directed exercise program      She is scheduled to have head CT 08/24/18 in follow-up.  She is having more pain in her right forehead.  She is also having a cervical spine CT and she is having more pain from neck into her right shoulder.  She gets some numbness/tingling into her right hand, does have history of carpal tunnel bilateral surgery at least 20 years ago.  She is right handed and has left hand dupuytren's and sees Dr. Kei Borrego.    She also had diagnosed sinus infection on 08/17/18 and was treated with Augmentin.  She states she is still taking it and still coughing up yellow mucus and she doesn't feel better yet.      She reports she stopped the Lyrica on 07/27/18 as her neuropathy was worse on medication.  She states she increased as directed but 75 mg three times a day was too dizzy and off balance so she reduced to two times a day dosing and didn't like that either and felt more nauseated than normal and reduced back to 75 mg 1 at bedtime for over 1.5 months.  She reports she gained 10 pounds on it and \"all she could think about was food.\"  She stated her feet were also swelling from the medication and her neuropathy was worse than normal by 20x.  She didn't walk for a week as her feet were so sore.  She states since discontinuation over 3 days her feet felt better.  She states her neuropathy is almost gone now and her " "feet haven't felt this good in years.      Review of Systems  A 12 point ROS was completed and was negative except for above listed and ongoing nausea, intermittent vomiting.  Weight stable, denies fever.  Sleep interrupted due to stress and pain.       Objective:     Vitals:    08/22/18 0904   BP: 117/64   Pulse: 71   Resp: 16   Weight: 219 lb (99.3 kg)   Height: 5' 3\" (1.6 m)   PainSc:   6     Physical Exam  General- patient is alert and oriented, in NAD, well-groomed, well-nourished.  Appears comfortable, Obese. Presents alone today.  Psych- Judgment and insight normal, AOx4, speech and thought process normal, recent and remote memory normal, mood and affect concerned but pleasant.  Respiratory- breathing is non-labored, normal rhythm and rate.  Cardiovascular- extremities warm and well perfused, no peripheral edema or varicosities  Spine- normal gross alignment, no focal tenderness on palpation, no spasms  Musculoskeletal- Able to sit to stand with difficulty.  Ambulates with antalgia independently.    *Opioid [place] Precautions:    UDS/Swab - 10/19/17, results as expected  Opioid Consent - 04/12/18  Opioid Agreement - 04/12/18  Pharmacy- as documented    [place]  Reviewed - 08/22/18 as expected.    Pill Count - 10/17/16 appropriate  Psychological evaluation - n/a  MME - up to 75  Pharmacogenetic testing - n/a  EKG reviewed from 8/20/18: NSR, QT/QTc: 374/420 ms  FAUSTINO 06/07/18    Assessment:   Citlalli Noonan is a 72 y.o. female seen in clinic today for lower extremity pain secondary to diabetic peripheral neuropathy which she reports was increase with Lyrica for 1.5 months but after Lyrica was discontinued her neuropathy pain is much better.  I am unable to explain this but will monitor.  She has lumbar pain due to degeneration and left leg radiculopathy treated with methadone.  She is having cervical CT scan ordered by PCP due to neck pain and right arm paresthesias.    Plan:   Continue methadone 5 mg every 6 " hours.    As your opioid dose remains stable, I will send electronic refills to the pharmacy for 2 subsequent months until your next appointment so you do not have to call our refill line.  The fill dates for your medications are:  08/22/18 & 09/19/18  Check with your pharmacy that all prescriptions are on your profile. Call the pharmacy a week before you want to fill the prescription as stock may vary and the pharmacy may need to order the medication for you. I reserve the right to cancel the electronic prescription at the pharmacy in between appointments and would contact you with an explanation if this were to occur.  Continue Endocet 10/325 mg 1-2 daily as needed for severe or breakthrough pain - no refill needed today.   Discontinued Lyrica due to side effects.  Keep appointment for head and neck CT on 08/24/18.  Reviewed EKG today, looks fine.  Follow-up in 8 weeks with Shannan Gagnon PA-C  [facility]  [address]  [place] [address]  Ph: [phone]  Fax: [phone]

## 2021-06-26 NOTE — PROGRESS NOTES
Progress Notes by Shannan Gagnon PA-C at 2018  2:18 PM     Author: Shannan Gagnon PA-C Service: -- Author Type: Physician Assistant    Filed: 2018  9:34 AM Date of Service: 2018  2:18 PM Status: Signed    : Shannan Gagnon PA-C (Physician Assistant)       PAIN CLINIC PROGRESS NOTE    Subjective:   Citlalli Noonan is a 72 y.o. female who presents for evaluation of low back pain.  She has lumbar degeneration, OA bilateral knees, and diabetic peripheral neuropathy.  She has continued low dose methadone as this helps pain but does have intermittent nausea/vomiting as a side effect.  Weaning off medication caused uncontrolled pain and poor sleep and trial of other extended release opioids did no help reduce neuropathic pain.  She was also to continue Endocet, topicals, and muscle relaxants.  She has been educated on lumbar sympathetic block for lower extremity neuropathy.    Major issues:  1. Diabetic peripheral neuropathy    2. Chronic pain syndrome    3. Opioid Dependence With Continuous Use    4. Lumbar Disc Degeneration       Pain Ratin/10 constant aching bilateral leg pain, feet pain, low back pain. Function rated 7.   Last visit pain rated 8/10.    Radiation of pain: left sided leg pain ongoing and chronic to left posterior calf, intense paresthesias  Paresthesias, numbness, weakness: bilateral leg/foot numbness and weakness   Gait disturbance: occasionally uses a cane, denies recent falls.   GI/: No constipation or urinary or stool incontinence  Exacerbating factors: Stress, activity including walking or standing too long, moving, hot/humid weather  Relieving factors: Pain improves with rest, medications, elevation of feet, lidoderm patches.  Associated Symptoms: Numbness in bilateral feet and left hip, weakness, night pain, fever/chills.  Denies bowel or bladder incontinence, unexplained weight loss.  Functional Symptoms: Pain interferes with sleep,walking, work, ADLs,  "mood (frustrated, anxious).  Adverse effects of medications: Nausea, vomiting intermittently. Takes Phenergan and compazine.  Constipation uses senna or Miralax but trying not to take every day, BM every 2-3 days.  Current treatment efficacy: Fair.  States she is taking methadone 5 mg in am and pm and 10 mg at bedtime. Endocet 10/325 mg works well for breakthrough pain, doesn't take daily.  Current treatment compliance: Fair. Keeping follow-up appointments and taking medication as prescribed. No current PT, injections, BH.      Since last visit she has had several health appointments.    Saw ENT on 04/1/18 for hemoptysis and hoarse voice reviewed today:  \"IMPRESSION:   Hoarseness with episode of hemoptysis 3 weeks ago in the setting of shortness of breath, possibly due to underlying infection. Exam is reassuring today, will still plan to proceed with CT neck chest w to rule out any underlying pathology.    Laryngopharyngeal reflux    PLAN:  Medications: consider omeprazole 20 mg daily, lifestyle modifications discussed today and provided on AVS.  Diagnostics: CT neck and chest W  Follow-up: pending results of CT, or as needed if symptoms persist or worsen.\"    She was educated and recommended on Lyrica trial last visit and has completed paperwork so she is able to apply to financial assistance program for [employer].  She found out copay cost through [employer] and she isn't sure she would be able to afford it.    She saw Stacia Thrasher for DM2 check and labs, basaglar increased to 50 U am and 30 U pm.  She states she felt this increase was unnecessary so will speak with PMD about adjustment.    She also saw opthalmologist Dr. 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   KNOWN THYROID PATIENT...stable w PMD 2018  Posterior blepharitis AS WELL - 6/10  Past dx of pituitary mass/ tumor - removed 04/2017  M better intraocular pressure BOTH EYES   Ocular " "hypertension in past w NORMAL testing   NORMAL 2015 visual field     Plan:  SEE PMD - has apt    Hot pack DAILY- DO DAILY   INCREASE ARTIFICIAL TEARS THREE TIMES A DAY   Fml BOTH EYES TWO TIMES A DAY - THEN DAILY   F/U 4m; intraocular pressure - intraocular pressure- MONITOR DELVIS'S   had 60 degree visual field w dr neville for pituitary mass/ tumor- removed 4/14/17  T/c MANIFEST REFRACTION   F/U W DR ROSS AS SCHED'D THIS SUMMER   Vaseline to lids   She was given ok to take Lyrica.  She hasn't started it yet but will start today.  She is worried about weight gain on medication so she will monitor this.  Reports sleeping better with 4 hour stretches.  She states she is having more lary horses/leg cramps with warm weather.  She is less active when hot out and she states this increases her back pain.  Denies any new pain symptoms or recent injury or fall.      She was planning to see orthopedics but cancelled that visit as she had other appointments and felt it wasn't necessary. She states some days difficult to walk.  She has had hip bursa injections in the past which worked for a week and then pain returned.  She denies hip PT.  She is not taking OTC medications, using heat/ice, or topicals for pain.  She states she isn't interested in hip surgery and is dealing with it.      Review of Systems  A 12 point ROS was completed and was negative except for above listed and ongoing nausea, intermittent vomiting.  Weight stable, denies fever.  Sleep interrupted due to stress and pain.       Objective:     Vitals:    06/07/18 1423   BP: 122/68   Pulse: 73   Resp: 16   Weight: 218 lb (98.9 kg)   Height: 5' 5.5\" (1.664 m)   PainSc:   7     Physical Exam  General- patient is alert and oriented, in NAD, well-groomed, well-nourished.  Appears comfortable, Obese. Presents alone today.  Psych- Judgment and insight normal, AOx4, speech and thought process normal, recent and remote memory normal, mood and affect concerned " but pleasant.  Respiratory- breathing is non-labored, normal rhythm and rate.  Cardiovascular- extremities warm and well perfused, no peripheral edema or varicosities  Spine- normal gross alignment, no focal tenderness on palpation, no spasms  Musculoskeletal- Able to sit to stand with difficulty.  Ambulates with antalgia independently.    *Opioid [place] Precautions:    UDS/Swab - 10/19/17, results as expected  Opioid Consent - 04/12/18  Opioid Agreement - 04/12/18  Pharmacy- as documented    [place]  Reviewed - 06/07/18 as expected.    Pill Count - 10/17/16 appropriate  Psychological evaluation - n/a  MME - up to 75  Pharmacogenetic testing - n/a  EKG 04/03/17 Normal sinus rhythm QT/QTc 378/413 ms  FAUSTINO Score: 58 06/07/18    Assessment:   Citlalli Noonan is a 72 y.o. female seen in clinic today for lower extremity pain secondary to diabetic peripheral neuropathy and lumbar pain due to degeneration and left leg radiculopathy treated with methadone.  Due to chronic methadone use and the potential for QT prolongation, the patient was given an order to have a 12-lead electrocardiogram done to monitor the QT interval.  This should be completed prior to the next follow-up. She is educated on neuropathic medications, she has previously failed Gabapentin due to negative effects on blood sugars and Amitriptyline as it wasn't effective and she is recommended to start Lyrica for diabetic peripheral neuropathy.      Plan:   Continue methadone 5 mg every 6 hours.    As your opioid dose remains stable, I will send electronic refills to the pharmacy for 2 subsequent months until your next appointment so you do not have to call our refill line.  The fill dates for your medications are:  06/14/18 & 07/12/18  Check with your pharmacy that all prescriptions are on your profile. Call the pharmacy a week before you want to fill the prescription as stock may vary and the pharmacy may need to order the medication for you. I reserve the  right to cancel the electronic prescription at the pharmacy in between appointments and would contact you with an explanation if this were to occur.  Continue Endocet 10/325 mg 1-2 daily as needed for severe or breakthrough pain - no refill needed today.   Start Lyrica 75 mg 1 capsule at bedtime x 3 days, then increase to 75 mg twice a day x 3 days, then 75 mg 3 times a day.  Call with any side effect concerns.  We discussed today that methadone has the potential to create changes in your heart beat called QT prolongation.  Due to this, please schedule your annual 12-lead EKG prior to your next follow-up.  If there are any significant changes, you will receive a phone call.  Otherwise, we will discuss the results at your next follow-up visit.  Follow-up in 8 weeks with Shannan Gagnon PA-C  [facility]  [address] [place] [address]. [address]  [place] [address]  Ph: [phone]  Fax: [phone]
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
## 2021-06-29 NOTE — PROGRESS NOTES
"Progress Notes by Nithya Troncoso CMA at 4/30/2020  2:20 PM     Author: Nithya Troncoso CMA Service: -- Author Type: Certified Medical Assistant    Filed: 4/30/2020  4:43 PM Date of Service: 4/30/2020  2:20 PM Status: Signed    : Nithya Troncoso CMA (Certified Medical Assistant)       Citlalli Noonan is a 74 y.o. female who is being evaluated via a billable telephone visit regarding neck, back and neuropathy pain. Patient describes her pain as . Patient's everyday functions effected by pain include: walking, sleep and mood.      The patient has been notified of following:     \"This telephone visit will be conducted via a call between you and your physician/provider. We have found that certain health care needs can be provided without the need for a physical exam.  This service lets us provide the care you need with a short phone conversation.  If a prescription is necessary we can send it directly to your pharmacy.  If lab work is needed we can place an order for that and you can then stop by our lab to have the test done at a later time. Telephone visits are billed at different rates depending on your insurance coverage. During this emergency period, for some insurers they may be billed the same as an in-person visit.  Please reach out to your insurance provider with any questions.\"    Patient has given verbal consent to a Telephone visit? Yes    Patient would like to receive their AVS by AVS Preference: Mail a copy.         Nithya Troncoso CMA             "

## 2021-06-29 NOTE — PROGRESS NOTES
"Progress Notes by Lisa Augustine LPN at 6/25/2020  2:20 PM     Author: Lisa Augustine LPN Service: -- Author Type: Licensed Nurse    Filed: 6/29/2020  1:26 PM Date of Service: 6/25/2020  2:20 PM Status: Signed    : Lisa Augustine LPN (Licensed Nurse)       Citlalli Noonan is a 74 y.o. female who is being evaluated via a billable telephone visit.      The patient has been notified of following:     \"This telephone visit will be conducted via a call between you and your physician/provider. We have found that certain health care needs can be provided without the need for a physical exam.  This service lets us provide the care you need with a short phone conversation.  If a prescription is necessary we can send it directly to your pharmacy.  If lab work is needed we can place an order for that and you can then stop by our lab to have the test done at a later time.    Telephone visits are billed at different rates depending on your insurance coverage. During this emergency period, for some insurers they may be billed the same as an in-person visit.  Please reach out to your insurance provider with any questions.    If during the course of the call the physician/provider feels a telephone visit is not appropriate, you will not be charged for this service.\"    Patient has given verbal consent to a Telephone visit? Yes    What phone number would you like to be contacted at? [phone]    Patient would like to receive their AVS by AVS Preference: Mail a copy.  Pain score---9/10  Constant or intermittent---constant  What does your pain feel like during a flare (description)---ache, throbbing, radiating  Does the pain interfere with:  Work ----- yes  Walking ------ yes  Sleep ------- yes  Daily activities ------yes  Relationships -------no  F= 7      Lisa Augustine LPN           "

## 2021-06-29 NOTE — PROGRESS NOTES
"Progress Notes by Nithya Troncoso CMA at 7/15/2020  7:40 AM     Author: Nithya Troncoso CMA Service: -- Author Type: Certified Medical Assistant    Filed: 7/15/2020  8:10 AM Date of Service: 7/15/2020  7:40 AM Status: Signed    : Nithya Troncoso CMA (Certified Medical Assistant)       Citlalli Noonan is a 74 y.o. female who is being evaluated via a billable telephone visit.      The patient has been notified of following:     \"This telephone visit will be conducted via a call between you and your physician/provider. We have found that certain health care needs can be provided without the need for a physical exam.  This service lets us provide the care you need with a short phone conversation.  If a prescription is necessary we can send it directly to your pharmacy.  If lab work is needed we can place an order for that and you can then stop by our lab to have the test done at a later time. Telephone visits are billed at different rates depending on your insurance coverage. During this emergency period, for some insurers they may be billed the same as an in-person visit.  Please reach out to your insurance provider with any questions.\"    Patient has given verbal consent to a Telephone visit? Yes    What phone number would you like to be contacted at? home    Patient would like to receive their AVS by AVS Preference: Mail a copy.  Pain score---8  Constant   What does your pain feel like: ache, sharp at bedtime  Does the pain interfere with:  Work -----no  Walking ------yes  Sleep -------yes  Daily activities -----yes  Relationships -------yes  F= 4    Nithya Troncoso CMA           "

## 2021-06-30 ENCOUNTER — HOSPITAL ENCOUNTER (OUTPATIENT)
Dept: PALLIATIVE MEDICINE | Facility: OTHER | Age: 76
Discharge: HOME | End: 2021-06-30
Attending: PHYSICIAN ASSISTANT
Payer: COMMERCIAL

## 2021-06-30 DIAGNOSIS — G89.4 CHRONIC PAIN SYNDROME: ICD-10-CM

## 2021-06-30 DIAGNOSIS — M48.02 CERVICAL STENOSIS OF SPINAL CANAL: ICD-10-CM

## 2021-06-30 RX ORDER — IBUPROFEN 800 MG/1
800 TABLET, FILM COATED ORAL EVERY 8 HOURS PRN
Qty: 90 TABLET | Refills: 3 | Status: SHIPPED | OUTPATIENT
Start: 2021-06-30 | End: 2021-11-16

## 2021-06-30 RX ORDER — METHADONE HYDROCHLORIDE 5 MG/1
TABLET ORAL
Qty: 140 TABLET | Refills: 0 | Status: SHIPPED | OUTPATIENT
Start: 2021-08-11 | End: 2021-08-18

## 2021-06-30 RX ORDER — METHADONE HYDROCHLORIDE 5 MG/1
TABLET ORAL
Qty: 140 TABLET | Refills: 0 | Status: SHIPPED | OUTPATIENT
Start: 2021-07-14 | End: 2021-08-18

## 2021-06-30 ASSESSMENT — MIFFLIN-ST. JEOR: SCORE: 1485.19

## 2021-06-30 NOTE — PROGRESS NOTES
Progress Notes by Shannan Gagnon PA-C at 5/13/2021  3:00 PM     Author: Shannan Gagnon PA-C Service: -- Author Type: Physician Assistant    Filed: 5/15/2021  8:46 AM Date of Service: 5/13/2021  3:00 PM Status: Signed    : Shannan Gagnon PA-C (Physician Assistant)       PAIN CLINIC PROGRESS NOTE    Subjective:   Citlalli Noonan is a 74 y.o. female who presents for evaluation of low back pain.  She has lumbar degeneration, OA bilateral knees, and diabetic peripheral neuropathy.  She has continued low dose methadone as this helps pain but does have intermittent nausea/vomiting as a side effect.  Weaning off medication caused uncontrolled pain and poor sleep and trial of other extended release opioids did no help reduce neuropathic pain.  She was also to continue Endocet, topicals, and muscle relaxants.  She has been educated on lumbar sympathetic block for lower extremity neuropathy.    Major issues:  1. Chronic, continuous use of opioids    2. Chronic pain syndrome    3. Lumbar Disc Degeneration    4. Lumbar radicular pain    5. Diabetic peripheral neuropathy (H)       Pain Rating: see CMA note  Radiation of pain: left sided leg pain ongoing and chronic to left posterior calf  Gait disturbance: occasionally uses a cane, denies recent falls.   Exacerbating factors: Stress, activity including walking or standing too long, moving, sleeping, weather  Relieving factors: medications, walking, rest.  Associated Symptoms: Numbness in bilateral feet/legs (neuropathy) and left hip, night pain. Denies fever/chills, weakness, bowel or bladder incontinence.   Functional Symptoms: See CMA notes  Adverse effects of medications: Nausea, vomiting intermittently 2-3 times per week. Takes zofran prn.  Constipation taking Apple cider vinegar capsule once per day helps keep her regular.    Current treatment efficacy: Good. See medication list.  Current treatment compliance: Good. Keeping follow-up appointments,  "taking medication as prescribed. Recently completed PT for cervical spine.  Exercising at home with light weights and walking.      She met with neurologist on 03/17/2021 and had EMG reviewed today:  \"Impression    1. No evidence of mononeuropathy by nerve conduction studies    2. Subtle mild chronic motor changes in the ulnar innervated hand muscles is nonspecific. Could be seen with subtle motor ulnar neuropathy. Localization unclear as nerve conduction studies are normal\"    She states she returned to Dr. Kei Mccollum, [facility].  She states her wrist feels like a toothache and was told not carpal tunnel but possible ulnar neuropathy.  She states she is unsure if she will pursue surgery.  She is having difficulty making a fist difficult to  objects.  She has numbness in her palm which she can feel some tingling.  She did have median nerve injection which she reports didn't help.    Reviewed [facility] note from 03/23/2021:  \"    She states she sweats so bad all year long and spends whole summer in the house.  Her hair gets wet since pituitary surgery and \"pours off her.\"  She has seen specialists regarding this and discussed it is likely related to medications. We discuss methadone and last visit willing to reduce daily dose by 5 mg which she reports she is still sweating which she is unsure this will change unless she stops the medication all together. She would like to stay at this dose longer.  She states the pain overall didn't change much.      She has tried gabapentin, amitriptyline, lyrica with side effects.  She denies other neuropathic medication trials including duloxetine, off label amantadine or namenda.  She has not taken PEA or CBD oil.   She stopped topamax 50 mg at night as it was too sedating into the next day, does not want to restart at 25 mg at this time.  She did find out that acupuncture is covered by insurance this year, interested in this but hasn't started due to " COVID.    She states that her toes always hurt from the neuropathy.   Denies changes in her pain locations or severity, always on left side.  She states she has 2-4 hours of sleep at night. She tries to lay on right side and just doesn't work.  She has a chair that reclines to a bed and she does feel that is helpful to position at an incline.      Recommended trial of duloxetine 30 mg every day x 7 days then 60 mg daily and she reports she hasn't started yet. Denies questions about it. She states she will start now due to increased nerve pain and mood changes with stress of niece still battling cancer and her good friend passed away recently,  was last week.    Her niece finished chemotherapy treatments and weaning off feeding tube.  She is anxious to visit her and connecting with her via [facility].      Review of Systems  A 12 point ROS was completed and was negative except for above listed and ongoing nausea, intermittent vomiting.  Weight stable, denies fever.  Sleep interrupted due to stress and pain.        Objective:     Vitals:    21 1505   BP: 125/73   Pulse: 67   Resp: 16       Physical Exam  Constitutional- General appearance: Normal.  Well developed, comfortable, overweight, and appearance reflects stated age.   No acute distress or pain behaviors noted.  Presents alone today.  Psychiatric- Judgment and insight: Normal.  Speech: Normal rhythm.  Thought process: Normal.  No abnormal thoughts reported. Alert & Oriented to person, place, and time.  Recent and remote memory: Normal.  Observed mood: Concerned, a bit tearful discussing friend's passing.  Respiratory- Breathing is non-labored; normal rhythm and rate.  Dermatologic- Exposed skin is clean, dry, and intact to inspection.  Musculoskeletal- Gait and station: Ambulates independently with antalgia. Able to sit to stand with some difficulty.    *Opioid [place] Precautions:    UDT/Blood - 21 results as expected  Opioid Consent -  01/21/2021  Opioid Agreement - 01/21/2021  Pharmacy- as documented    [place]  Reviewed - 05/13/2021 as expected.  Last refill endocet 03/19/21 for #60 doesn't need refilled yet.  Pill Count - 01/15/20 appropriate  Psychological evaluation - n/a  MME - 76 + 15 prn from Endocet   Pharmacogenetic testing - n/a  EKG 10/01/19, NSR and QT/QTc 400/419 ms    Imaging:  CT Head Brain without Contrast 01/2/2020  IMPRESSION:  1.  Unchanged postoperative appearance when compared to 08/28/2018. No new acute  intracranial abnormality.     2.  Unchanged small amount of enhancing tissue in the right aspect of the sella  which may represent normal residual pituitary tissue. No convincing evidence of  residual/recurrent tumor.    EXAM: XR HIP 2 OR 3 VIEWS W PELVIS LEFT  LOCATION: [facility]  DATE/TIME: 12/23/2019 11:54 AM    INDICATION: Osteoarthritis Of Spine With Radiculopathy, Lumbar Region  COMPARISON: 08/15/2017    Findings:  The symphysis pubis and pubic rami are normal. The hips demonstrate a mild  degree of symmetric degenerative change characterized by central loss of  articular cartilage, minor marginal spurring and minimal sclerosis. Targeted  attention of the left hip is negative for fracture. There is a 2 cm focus of  circumscribed calcification in the subcutaneous soft tissues of the  anterolateral aspect of the left proximal thigh. These findings are stable  dating back to 2017 and likely represent an area of old osteoporosis. The sacral  wings, sacroiliac joints and iliac wings are negative.    IMPRESSION: Negative for acute bony abnormality. Mild symmetric degenerative  changes in both hips.     CT Spine Lumbar without Contrast 10/19/20  IMPRESSION:    1.  Postsurgical changes right L5 hemilaminectomy.  2.  Sclerotic lesion within the left aspect of the sacrum, relatively stable  since 05/03/2010, which most consistent with a bone island.  3.  No evidence of acute fracture or subluxation of the  lumbar spine by CT  imaging.  4.  Lumbar spondylosis with level by level analysis as described above    Assessment:   Citlalli Noonan is a 74 y.o. female with visit today for lower extremity pain secondary to diabetic peripheral neuropathy, cervical pain with CT scan demonstrating multiple levels of moderate stenosis and degeneration, lumbar pain due to degeneration and left leg radiculopathy treated with methadone.  She had updated lumbar CT and we have reviewed lumbar facet injection treatment as needed.  For neuropathic pain, discuss failure of gabapentin, amitriptyline and lyrica, and intolerance to 50 mg topamax (may consider 25 mg trial in the future until she can tolerate higher dose).  Recommend a trial of duloxetine for chronic pain and mood benefit; she is educated on this medication and possible risks/side effects.  She is also educated on risk with respiratory depression on opioids with untreated sleep apnea; she plans to see PCP to re-evaluate for CPAP.  Discuss opioids can also cause hyperhidrosis that she is experiencing and she wishes to start reducing dose of methadone gradually - no change in dose today.      Plan:   Continue methadone 15 mg in the am and 10 mg at night   Check with your pharmacy that all prescriptions are on your profile. Call the pharmacy a week before you want to fill the prescription as stock may vary and the pharmacy may need to order the medication for you. I reserve the right to cancel the electronic prescription at the pharmacy in between appointments and would contact you with an explanation if this were to occur.  Refills sent for 05/17/2021 & 06/14/2021  Continue Endocet 10/325 mg 1-2 daily as needed for severe or breakthrough pain.  No refill needed today  Start Duloxetine trial for pain and mood - take 30 mg at bedtime x 7 days, then increase to 60 mg daily (may take 2 together or take 1 in am and 1 in pm).  Discussed this will take up to 4 weeks to see benefits for  pain. Call nurse line with any concerns once started. Can also refer for psychotherapy or grief support groups as needed  Schedule with dermatologist for hair loss concerns  Schedule follow-up with Dr. Mccollum regarding ulnar neuropathy   Follow-up in 8 weeks with Shannan Gagnon PA-C  [facility]  [address]  [place] [address]  Ph: [phone]  Fax: [phone]

## 2021-07-03 NOTE — ADDENDUM NOTE
Addendum Note by Areli Aden RN at 6/12/2017 11:37 AM     Author: Areli Aden RN Service: -- Author Type: Registered Nurse    Filed: 6/12/2017 11:37 AM Encounter Date: 5/22/2017 Status: Signed    : Areli Aden RN (Registered Nurse)    Addended by: ARELI ADEN on: 6/12/2017 11:37 AM        Modules accepted: Orders

## 2021-07-03 NOTE — ADDENDUM NOTE
Addendum Note by Patria Jones at 6/25/2020  2:20 PM     Author: Patria Jones Service: -- Author Type: --    Filed: 7/1/2020 10:05 AM Date of Service: 6/25/2020  2:20 PM Status: Signed    : Patria Jones    Encounter addended by: Patria Jones on: 7/1/2020 10:05 AM      Actions taken: Charge Capture section accepted

## 2021-07-03 NOTE — ADDENDUM NOTE
Addendum Note by Patria Jones at 7/15/2020  7:40 AM     Author: Patria Jones Service: -- Author Type: --    Filed: 7/20/2020 10:29 AM Date of Service: 7/15/2020  7:40 AM Status: Signed    : Patria Jones    Encounter addended by: Patria Jones on: 7/20/2020 10:29 AM      Actions taken: Charge Capture section accepted

## 2021-07-03 NOTE — ADDENDUM NOTE
Addendum Note by Patria Jones at 4/30/2020  2:20 PM     Author: Patria Jones Service: -- Author Type: --    Filed: 5/20/2020  8:45 AM Date of Service: 4/30/2020  2:20 PM Status: Signed    : Patria Jones    Encounter addended by: Patria Jones on: 5/20/2020  8:45 AM      Actions taken: Charge Capture section accepted

## 2021-07-03 NOTE — ADDENDUM NOTE
Addendum Note by Patria Jones at 11/11/2020 10:40 AM     Author: Patria Jones Service: -- Author Type: --    Filed: 11/13/2020  6:24 AM Date of Service: 11/11/2020 10:40 AM Status: Signed    : Patria Jones    Encounter addended by: Patria Joens on: 11/13/2020  6:24 AM      Actions taken: Charge Capture section accepted

## 2021-07-04 NOTE — PATIENT INSTRUCTIONS - HE
Patient Instructions by Shannan Gagnon PA-C at 6/30/2021  9:00 AM     Author: Shannan Gagnon PA-C Service: -- Author Type: Physician Assistant    Filed: 6/30/2021  9:50 AM Date of Service: 6/30/2021  9:00 AM Status: Signed    : Shannan Gagnon PA-C (Physician Assistant)       Continue methadone 15 mg in the am and 10 mg at night.  Discussed risk of respiratory depression with opioids.  If you have any worsened shortness of breath, you need to stop dose and be evaluated in ED. I would expect to discuss with your doctors if they are concerned about this dose after your cardiac work up.  Check with your pharmacy that all prescriptions are on your profile. Call the pharmacy a week before you want to fill the prescription as stock may vary and the pharmacy may need to order the medication for you. I reserve the right to cancel the electronic prescription at the pharmacy in between appointments and would contact you with an explanation if this were to occur.  Refills sent for  07/14/2021 & 08/11/2021  Continue Endocet 10/325 mg 1-2 daily as needed for severe or breakthrough pain.  No refill needed today  For left hip bursitis, obtain lidocaine 4% patch over the counter, may take ibuprofen 800 mg 1 tab with food up to 3 times a day as needed.  May repeat injection as needed.  Follow-up in 8 weeks with Shannan haq

## 2021-07-04 NOTE — PROGRESS NOTES
Progress Notes by Shannan Gagnon PA-C at 6/30/2021  9:00 AM     Author: Shannan Gagnon PA-C Service: -- Author Type: Physician Assistant    Filed: 6/30/2021  9:54 AM Date of Service: 6/30/2021  9:00 AM Status: Signed    : Shannan Gagnon PA-C (Physician Assistant)       PAIN CLINIC PROGRESS NOTE    Subjective:   Citlalli Noonan is a 74 y.o. female who presents for evaluation of low back pain.  She has lumbar degeneration, OA bilateral knees, and diabetic peripheral neuropathy.  She has continued low dose methadone as this helps pain but does have intermittent nausea/vomiting as a side effect.  Weaning off medication caused uncontrolled pain and poor sleep and trial of other extended release opioids did no help reduce neuropathic pain.  She was also to continue Endocet, topicals, and muscle relaxants.  She has been educated on lumbar sympathetic block for lower extremity neuropathy.    Major issues:  1. Chronic, continuous use of opioids    2. Chronic pain syndrome    3. Lumbar Disc Degeneration    4. Lumbar radicular pain    5. Diabetic peripheral neuropathy (H)       Pain Rating: see CMA note  Radiation of pain: left sided leg pain ongoing and chronic to left posterior calf  Gait disturbance: occasionally uses a cane, denies recent falls.   Exacerbating factors: Stress, activity including walking or standing too long, moving, sleeping, weather  Relieving factors: medications, walking, rest.  Associated Symptoms: Numbness in bilateral feet/legs (neuropathy), night pain. Denies fever/chills, weakness, bowel or bladder incontinence.   Functional Symptoms: See CMA notes  Adverse effects of medications: Nausea, vomiting intermittently 2-3 times per week. Takes zofran prn. Sweating.  Constipation taking Apple cider vinegar capsule once per day helps keep her regular.    Current treatment efficacy: Good. See medication list.  Current treatment compliance: Good. Keeping follow-up appointments,  "taking medication as prescribed. Recently completed PT for cervical spine.  Exercising at home with light weights and walking.      Since last visit, she had appointment with Stacia Thrasher CNP 05/24/2021 reviewed today:  \"Citlalli was seen today for headache.    Diagnoses and all orders for this visit:    Partial hypopituitarism (HC)  - CT HEAD BRAIN PITUITARY W; Future    Essential hypertension    Opioid type dependence, continuous (HC)    Lymphadenopathy, cervical  - CT NECK SOFT TISSUE W; Future    Cough  - XR CHEST 2 VIEWS PA AND LATERAL; Future    Recurrent sinusitis  - Cancel: CT SINUS WO; Future    Pituitary macroadenoma (HC)    Follow up after scan results return.\"    She has not yet had CT scans. Did complete mammogram and CXR, results were normal.    She had follow-up on 06/08/2021 with Stacia Thrasher NP:  \"Diagnoses and all orders for this visit:    Heart palpitations  - HOLTER MONITOR 24 HOURS; Future    SOB (shortness of breath)  - ECHO COMPLETE WO CONTRAST; Future  - HOLTER MONITOR 24 HOURS; Future    Bilateral lower extremity edema  - ECHO COMPLETE WO CONTRAST; Future  - HOLTER MONITOR 24 HOURS; Future    Increase the lasix to 40 mg daily and eat foods high in potassium.    I will follow up with you when results return. If symptoms worsen, go to the ER at [facility].\"    She did not increase lasix because of her sweating.  She states the swelling is not as bad as it was.  She is not using compressions.  Denies sores, draining on legs or feet.  She has not done the cardiac testing yet as she had diarrhea x 7 days and then had a cough and had to reschedule.  She states she does have sputum production with cough and she has taken antibiotics in the past for this, went away with this treatment but has returned.   She states she is always tired but she didn't sleep more than 3 hours last night.  She states she always sleeps at 3 am and sleeps until 7 am and rarely sleeps during the day when she " "cannot stay awake.  She is inclining her recliner as she has difficult breathing if she lays flat.      She states she sweats so bad all year long and spends whole summer in the house.  Her hair gets wet since pituitary surgery and \"pours off her.\"  She has seen specialists regarding this and discussed it is likely related to medications. We discuss methadone and last visit willing to reduce daily dose by 5 mg which she reports she is still sweating which she is unsure this will change unless she stops the medication all together. She would like to stay at this dose longer.  She states the pain overall didn't change much.      She states no changes in her left hand/arm - she states it is numb/tingling and weaker than the right hand as her sensation is different. She states she doesn't want to have surgery with Dr. Mccollum.   She is right handed.  She states her spine pain has been \"pretty good\" and everything feels pretty good except her left hip.  She states it her bursa feels on fire and Ibuprofen helps with that and wants it refilled.  She is not using any topicals as she doesn't have the lidocaine patch.   She has tried salon pas.  She states she had bursa injections which was last done 01/16/2019 which she states lasted a couple weeks at the most.      She has tried gabapentin, amitriptyline, lyrica with side effects.  She denies other neuropathic medication trials including duloxetine, off label amantadine or namenda.  She has not taken PEA or CBD oil.   She stopped topamax 50 mg at night as it was too sedating into the next day, does not want to restart at 25 mg at this time.  She did find out that acupuncture is covered by insurance this year, interested in this but hasn't started due to COVID.       Recommended trial of duloxetine 30 mg every day x 7 days then 60 mg daily and she reports she hasn't started yet. Denies questions about it.      Review of Systems  A 12 point ROS was completed and was negative " except for above listed and ongoing nausea, intermittent vomiting.  Weight stable, denies fever.  Sleep interrupted due to stress and pain.        Objective:     Vitals:    06/30/21 0907   BP: 143/78   Pulse: 74   Resp: 16       Physical Exam  Constitutional- General appearance: Diaphoretic, fanning herself with handicap parking sticker.  Well developed, comfortable, overweight, and appearance reflects stated age.   No acute distress or pain behaviors noted.  Presents alone today.  Psychiatric- Judgment and insight: Normal.  Speech: Normal rhythm.  Thought process: Normal.  No abnormal thoughts reported. Alert & Oriented to person, place, and time.  Recent and remote memory: Normal.  Observed mood: Appropriate  Respiratory- Breathing is non-labored; normal rhythm and rate.  No shortness of breath during visit.  Dermatologic- Exposed skin is clean, dry, and intact to inspection.  Musculoskeletal- Gait and station: Ambulates independently with antalgia. Able to sit to stand with some difficulty.    *Opioid [place] Precautions:    UDT/Blood - 1/21/21 results as expected  Opioid Consent - 01/21/2021  Opioid Agreement - 01/21/2021  Pharmacy- as documented    [place]  Reviewed - 06/30/2021 as expected.  Pill Count - 01/15/20 appropriate  Psychological evaluation - n/a  MME - 76 + 15 prn from Endocet   Pharmacogenetic testing - n/a  EKG 10/01/19, NSR and QT/QTc 400/419 ms    Imaging:  EMG 03/17/2021:  Impression    1.  No evidence of mononeuropathy by nerve conduction studies    2.  Subtle mild chronic motor changes in the ulnar innervated   hand muscles is nonspecific.  Could be seen with subtle motor   ulnar neuropathy.  Localization unclear as nerve conduction   studies are normal.    CT Head Brain without Contrast 01/2/2020  IMPRESSION:  1.  Unchanged postoperative appearance when compared to 08/28/2018. No new acute  intracranial abnormality.     2.  Unchanged small amount of enhancing tissue in the right aspect of  the sella  which may represent normal residual pituitary tissue. No convincing evidence of  residual/recurrent tumor.    EXAM: XR HIP 2 OR 3 VIEWS W PELVIS LEFT  LOCATION: [facility]  DATE/TIME: 12/23/2019 11:54 AM    INDICATION: Osteoarthritis Of Spine With Radiculopathy, Lumbar Region  COMPARISON: 08/15/2017    Findings:  The symphysis pubis and pubic rami are normal. The hips demonstrate a mild  degree of symmetric degenerative change characterized by central loss of  articular cartilage, minor marginal spurring and minimal sclerosis. Targeted  attention of the left hip is negative for fracture. There is a 2 cm focus of  circumscribed calcification in the subcutaneous soft tissues of the  anterolateral aspect of the left proximal thigh. These findings are stable  dating back to 2017 and likely represent an area of old osteoporosis. The sacral  wings, sacroiliac joints and iliac wings are negative.    IMPRESSION: Negative for acute bony abnormality. Mild symmetric degenerative  changes in both hips.     CT Spine Lumbar without Contrast 10/19/20  IMPRESSION:    1.  Postsurgical changes right L5 hemilaminectomy.  2.  Sclerotic lesion within the left aspect of the sacrum, relatively stable  since 05/03/2010, which most consistent with a bone island.  3.  No evidence of acute fracture or subluxation of the lumbar spine by CT  imaging.  4.  Lumbar spondylosis with level by level analysis as described above    Assessment:   Citlalli Noonan is a 74 y.o. female with visit today for lower extremity pain secondary to diabetic peripheral neuropathy, cervical pain with CT scan demonstrating multiple levels of moderate stenosis and degeneration, lumbar pain due to degeneration and left leg radiculopathy treated with methadone.  She had updated lumbar CT and we have reviewed lumbar facet injection treatment as needed.  For neuropathic pain, discuss failure of gabapentin, amitriptyline and lyrica, and  intolerance to 50 mg topamax (may consider 25 mg trial in the future until she can tolerate higher dose).  Recommend a trial of duloxetine for chronic pain and mood benefit; she is educated on this medication and possible risks/side effects.  She is also educated on risk with respiratory depression on opioids with untreated sleep apnea; she plans to see PCP to re-evaluate for CPAP and also needs cardiac work up for swelling in extremities and SOB.       Plan:   Continue methadone 15 mg in the am and 10 mg at night.  Discussed risk of respiratory depression with opioids.  If you have any worsened shortness of breath, you need to stop dose and be evaluated in ED. I would expect to discuss with your doctors if they are concerned about this dose after your cardiac work up.  Check with your pharmacy that all prescriptions are on your profile. Call the pharmacy a week before you want to fill the prescription as stock may vary and the pharmacy may need to order the medication for you. I reserve the right to cancel the electronic prescription at the pharmacy in between appointments and would contact you with an explanation if this were to occur.  Refills sent for  07/14/2021 & 08/11/2021  Continue Endocet 10/325 mg 1-2 daily as needed for severe or breakthrough pain.  No refill needed today  For left hip bursitis, obtain lidocaine 4% patch over the counter, may take ibuprofen 800 mg 1 tab with food up to 3 times a day as needed.  May repeat injection as needed.  Follow-up in 8 weeks with Shannan Gagnon PA-C  [facility]  [address]  [place] [address]  Ph: [phone]  Fax: [phone]    34 minutes spent on the date of the encounter doing chart review, history and exam, documentation, and further activities.

## 2021-07-04 NOTE — PROGRESS NOTES
Progress Notes by Nithya Troncoso CMA at 6/30/2021  9:00 AM     Author: Nithya Troncoso CMA Service: -- Author Type: Certified Medical Assistant    Filed: 6/30/2021  9:54 AM Date of Service: 6/30/2021  9:00 AM Status: Signed    : Nithya Troncoso CMA (Certified Medical Assistant)       Patient presents to the clinic today for a follow up with Shannan Gagnon PA-C.     Pain score: 6  Constant   What does your pain feel like: ache, sharp at bedtime  Does the pain interfere with:  Work: no  Walking: yes  Sleep: yes  Daily activities: yes  Relationships: yes  F= 7

## 2021-07-06 VITALS — BODY MASS INDEX: 36.15 KG/M2 | WEIGHT: 217 LBS | HEIGHT: 65 IN

## 2021-08-18 NOTE — PROGRESS NOTES
"Video Start Time: 859 AM    PAIN CLINIC PROGRESS NOTE    Subjective:   Citlalli Noonan presents for evaluation of low back pain.  She has lumbar degeneration, OA bilateral knees, and diabetic peripheral neuropathy.  She has continued low dose methadone as this helps pain but does have intermittent nausea/vomiting as a side effect.  Weaning off medication caused uncontrolled pain and poor sleep and trial of other extended release opioids did no help reduce neuropathic pain.  She was also to continue Endocet, topicals, and muscle relaxants.  She has been educated on lumbar sympathetic block for lower extremity neuropathy.    Major issues:  1. Chronic, continuous use of opioids    2. Chronic pain syndrome    3. Lumbar Disc Degeneration    4. Lumbar radicular pain    5. Diabetic peripheral neuropathy (H)       Pain Rating: see CMA note  Radiation of pain: left sided leg pain ongoing and chronic to left posterior calf  Gait disturbance: occasionally uses a cane, denies recent falls.   Exacerbating factors: Stress, activity including walking or standing too long, moving, sleeping, weather  Relieving factors: medications, walking, rest.  Associated Symptoms: Numbness in bilateral feet/legs (neuropathy), night pain. Denies fever/chills, weakness, bowel or bladder incontinence.   Functional Symptoms: See CMA notes  Adverse effects of medications: Nausea, vomiting intermittently 2-3 times per week. Takes zofran prn. Sweating.  Constipation taking Apple cider vinegar capsule once per day helps keep her regular.    Current treatment efficacy: Good. See medication list.  Current treatment compliance: Good. Keeping follow-up appointments, taking medication as prescribed. Recently completed PT for cervical spine.  Exercising at home with light weights and walking.      Since last visit, she had appointment with Stacia Thrasher CNP 07/20/2021:  \"Diagnoses and all orders for this visit:    Type 2 diabetes mellitus with diabetic " "polyneuropathy, with long-term current use of insulin (HC)  - BASIC METABOLIC PANEL; Future  - MAGNESIUM; Future  - HEMOGLOBIN A1C SCREENING; Future  - EXTRA TUBE GOLD/SST; Future  - BASIC METABOLIC PANEL  - MAGNESIUM  - Cancel: HEMOGLOBIN A1C SCREENING  - EXTRA TUBE GOLD/SST  - HEMOGLOBIN A1C MONITORING (POCT); Future  - HEMOGLOBIN A1C MONITORING (POCT)    Essential hypertension  - BASIC METABOLIC PANEL; Future  - MAGNESIUM; Future  - BASIC METABOLIC PANEL  - MAGNESIUM    Stage 3a chronic kidney disease (HC)  - BASIC METABOLIC PANEL; Future  - MAGNESIUM; Future  - BASIC METABOLIC PANEL  - MAGNESIUM    Hyperlipidemia    Acquired hypothyroidism  - TSH; Future  - TSH    Anxiety  - DULoxetine (CYMBALTA) 30 mg Delayed-release capsule; Take 1 Capsule (30 mg) by mouth once daily.    Acute recurrent maxillary sinusitis  - SPUTUM CULTURE, STAIN; Future    Follow up with me again in 4 weeks.\"     She was worried about sweating as a side effect on duloxetine so she didn't start it.  She will see her again in 1 month in person.  She states she may consider starting it later in the year as summer is hard for her with heat.  We review today the benefit this medication may give for pain as well.    She states her pain rating today is 9/10.  She states left hip is so swollen and sore from bursitis.  She states it is difficult to walk with this pain.  She states once she starts walking it is \"fine.\"  She doesn't want to do injection right now.  She states she is using topical spray which only is short term relief.  She states she doesn't like using gel as it is messy.      She states her lower back was sprained as she twisted and lifted a heavy box from [place] into her car a couple weeks ago, lasted 2 weeks but has resolved now. Was using her Ibuprofen and Endocet as needed was about 4 days ago that she took a dose.  She states it helps 50% at least.  She states she gets relief from constant pain.  She states constipation side " "effect but doesn't have to take stool softener daily.    She states no changes in her left hand/arm - she states it is numb/tingling and weaker than the right hand as her sensation is different. She states she doesn't want to have surgery with Dr. Mccollum.   She is right handed.  She states her spine pain has been \"pretty good\" and everything feels pretty good except her left hip.  She states it her bursa feels on fire and Ibuprofen helps with that and wants it refilled.  She is not using any topicals as she doesn't have the lidocaine patch.   She has tried salon pas.  She states she had bursa injections which was last done 01/16/2019 which she states lasted a couple weeks at the most.      She has tried gabapentin, amitriptyline, lyrica with side effects.  She denies other neuropathic medication trials including duloxetine, off label amantadine or namenda.  She has not taken PEA or CBD oil.   She stopped topamax 50 mg at night as it was too sedating into the next day, does not want to restart at 25 mg at this time.  She did find out that acupuncture is covered by insurance this year, interested in this but hasn't started due to COVID.       Review of Systems  A 12 point ROS was completed and was negative except for above listed and ongoing nausea, intermittent vomiting.  Weight stable, denies fever.  Sleep interrupted due to stress and pain.        Objective:   There were no vitals taken for this visit.    Physical Exam  Constitutional- General appearance: Diaphoretic, fanning herself with handicap parking sticker.  Well developed, comfortable, overweight, and appearance reflects stated age.   No acute distress or pain behaviors noted.  Presents alone today.  Psychiatric- Judgment and insight: Normal.  Speech: Normal rhythm.  Thought process: Normal.  No abnormal thoughts reported. Alert & Oriented to person, place, and time.  Recent and remote memory: Normal.  Observed mood: Appropriate  Respiratory- Breathing is " non-labored; normal rhythm and rate.  No shortness of breath during visit.  Dermatologic- Exposed skin is clean, dry, and intact to inspection.  Musculoskeletal- Gait and station: Ambulates independently with antalgia. Able to sit to stand with some difficulty.    *Opioid [place] Precautions:    UDT/Blood - 1/21/21 results as expected  Opioid Agreement - 01/21/2021  Pharmacy- as documented    [place]  Reviewed - 08/19/2021 as expected.  Pill Count - 01/15/20 appropriate  Psychological evaluation - n/a  MME - 76 + 15 prn from Endocet   Pharmacogenetic testing - n/a  EKG 10/01/19, NSR and QT/QTc 400/419 ms    Imaging:  EMG 03/17/2021:  Impression    1.  No evidence of mononeuropathy by nerve conduction studies    2.  Subtle mild chronic motor changes in the ulnar innervated   hand muscles is nonspecific.  Could be seen with subtle motor   ulnar neuropathy.  Localization unclear as nerve conduction   studies are normal.    CT Head Brain without Contrast 01/2/2020  IMPRESSION:  1.  Unchanged postoperative appearance when compared to 08/28/2018. No new acute  intracranial abnormality.     2.  Unchanged small amount of enhancing tissue in the right aspect of the sella  which may represent normal residual pituitary tissue. No convincing evidence of  residual/recurrent tumor.    EXAM: XR HIP 2 OR 3 VIEWS W PELVIS LEFT  LOCATION: [facility]  DATE/TIME: 12/23/2019 11:54 AM    INDICATION: Osteoarthritis Of Spine With Radiculopathy, Lumbar Region  COMPARISON: 08/15/2017    Findings:  The symphysis pubis and pubic rami are normal. The hips demonstrate a mild  degree of symmetric degenerative change characterized by central loss of  articular cartilage, minor marginal spurring and minimal sclerosis. Targeted  attention of the left hip is negative for fracture. There is a 2 cm focus of  circumscribed calcification in the subcutaneous soft tissues of the  anterolateral aspect of the left proximal thigh. These  findings are stable  dating back to 2017 and likely represent an area of old osteoporosis. The sacral  wings, sacroiliac joints and iliac wings are negative.    IMPRESSION: Negative for acute bony abnormality. Mild symmetric degenerative  changes in both hips.     CT Spine Lumbar without Contrast 10/19/20  IMPRESSION:    1.  Postsurgical changes right L5 hemilaminectomy.  2.  Sclerotic lesion within the left aspect of the sacrum, relatively stable  since 05/03/2010, which most consistent with a bone island.  3.  No evidence of acute fracture or subluxation of the lumbar spine by CT  imaging.  4.  Lumbar spondylosis with level by level analysis as described above    Assessment:   Citlalli Noonan presents with lower extremity pain secondary to diabetic peripheral neuropathy, cervical pain with CT scan demonstrating multiple levels of moderate stenosis and degeneration, lumbar pain due to degeneration and left leg radiculopathy treated with methadone.  She had updated lumbar CT and we have reviewed lumbar facet injection treatment as needed.  For neuropathic pain, discuss failure of gabapentin, amitriptyline and lyrica, and intolerance to 50 mg topamax (may consider 25 mg trial in the future until she can tolerate higher dose).  Recommend a trial of duloxetine for chronic pain and mood benefit; she is educated on this medication and possible risks/side effects.      Plan:   Continue methadone 15 mg in the am and 10 mg at night.    Check with your pharmacy that all prescriptions are on your profile. Call the pharmacy a week before you want to fill the prescription as stock may vary and the pharmacy may need to order the medication for you. I reserve the right to cancel the electronic prescription at the pharmacy in between appointments and would contact you with an explanation if this were to occur.  Refills sent for  09/09/2021 & 10/07/2021  Continue Endocet 10/325 mg 1-2 daily as needed for severe or breakthrough  pain.  No refill needed today  For left hip bursitis, use Voltaren (diclofenac) 1% gel apply 4 grams at least twice a day, up to 4 times a day.  Wash hands after using.  May take ibuprofen 800 mg 1 tab with food up to 3 times a day as needed.  May repeat injection as needed.  Follow-up in 8 weeks with Shannan Gagnon PA-C  [facility]  [address]  [place] [address]  Ph: [phone]  Fax: [phone]    Video-Visit Details    Type of service:  Video Visit    Video End Time:9:19 AM    Originating Location (pt. Location): Home    Distant Location (provider location):  [facility]     Platform used for Video Visit: [employer]      25 minutes spent on the date of the encounter doing chart review, history and exam, documentation, and further activities.

## 2021-08-18 NOTE — PROGRESS NOTES
Iris is a 75 year old who is being evaluated via a billable video visit.      How would you like to obtain your AVS? mail  If the video visit is dropped, the invitation should be resent by: [phone] [employer]IM[employer]  Will anyone else be joining your video visit? no    Pain score: 9  Constant   What does your pain feel like: ache, sharp at bedtime  Does the pain interfere with:  Work: no  Walking: yes  Sleep: yes  Daily activities: yes  Relationships: yes  F= 7

## 2021-08-19 ENCOUNTER — VIRTUAL VISIT (OUTPATIENT)
Dept: PALLIATIVE MEDICINE | Facility: OTHER | Age: 76
End: 2021-08-19
Payer: COMMERCIAL

## 2021-08-19 VITALS — HEART RATE: 77 BPM | SYSTOLIC BLOOD PRESSURE: 119 MMHG | TEMPERATURE: 98.6 F | DIASTOLIC BLOOD PRESSURE: 62 MMHG

## 2021-08-19 DIAGNOSIS — G89.4 CHRONIC PAIN SYNDROME: ICD-10-CM

## 2021-08-19 DIAGNOSIS — M51.379 DEGENERATION OF LUMBAR OR LUMBOSACRAL INTERVERTEBRAL DISC: ICD-10-CM

## 2021-08-19 DIAGNOSIS — M76.892 ENTHESOPATHY OF LEFT HIP REGION: ICD-10-CM

## 2021-08-19 DIAGNOSIS — E11.42 DIABETIC PERIPHERAL NEUROPATHY (H): ICD-10-CM

## 2021-08-19 DIAGNOSIS — M54.16 LUMBAR RADICULAR PAIN: ICD-10-CM

## 2021-08-19 DIAGNOSIS — F11.90 CHRONIC, CONTINUOUS USE OF OPIOIDS: Primary | ICD-10-CM

## 2021-08-19 PROCEDURE — 99207 PR CDG-MDM COMPONENT: MEETS MODERATE - UP CODED: CPT | Performed by: PHYSICIAN ASSISTANT

## 2021-08-19 PROCEDURE — 999N001193 HC VIDEO/TELEPHONE VISIT; NO CHARGE: Performed by: PHYSICIAN ASSISTANT

## 2021-08-19 PROCEDURE — 99214 OFFICE O/P EST MOD 30 MIN: CPT | Mod: 95 | Performed by: PHYSICIAN ASSISTANT

## 2021-08-19 RX ORDER — METHADONE HYDROCHLORIDE 5 MG/1
TABLET ORAL
Qty: 140 TABLET | Refills: 0 | Status: SHIPPED | OUTPATIENT
Start: 2021-08-19 | End: 2021-10-14

## 2021-08-19 RX ORDER — METHADONE HYDROCHLORIDE 5 MG/1
TABLET ORAL
Qty: 140 TABLET | Refills: 0 | Status: SHIPPED | OUTPATIENT
Start: 2021-10-07 | End: 2021-10-14

## 2021-08-19 ASSESSMENT — PAIN SCALES - GENERAL: PAINLEVEL: EXTREME PAIN (9)

## 2021-09-27 NOTE — TELEPHONE ENCOUNTER
2020 Prior Authorization Request  Who's requesting PA: Pharmacy  Provider:AYLIN Genao  Pharmacy Name, Location & Phone # : [facility] [address] [place] [phone] phone  Medication Name: Methadone HCI 5 mg  Insurance Plan: [employer]  Insurance Member ID # : [id number]  CoverMyMeds Key: N/A  Informed patient that prior authorizations can take up to 10 business days for response: YES  Okay to leave a detailed message: YES   none

## 2021-10-13 NOTE — PROGRESS NOTES
PAIN CLINIC PROGRESS NOTE    Subjective:   Citlalli Noonan presents for evaluation of low back pain.  She has lumbar degeneration, OA bilateral knees, and diabetic peripheral neuropathy.  She has continued methadone as this helps pain as weaning off medication caused uncontrolled pain and poor sleep and trial of other extended release opioids did not help reduce neuropathic pain.  She was also to continue Endocet, topicals, and muscle relaxants.  She has been educated on lumbar sympathetic block for lower extremity neuropathy.    Major issues:  1. Chronic, continuous use of opioids    2. Chronic pain syndrome    3. Lumbar Disc Degeneration    4. Lumbar radicular pain    5. Diabetic peripheral neuropathy (H)       Pain Rating: see rooming note.  Severe Pain (7)  Radiation of pain: left sided leg pain ongoing and chronic to left posterior calf  Gait disturbance: occasionally uses a cane, denies recent falls.   Exacerbating factors: Stress, activity including walking or standing too long, moving, sleeping, weather  Relieving factors: medications, walking, rest.  Associated Symptoms: Numbness in bilateral feet/legs (neuropathy), night pain. Denies fever/chills, weakness, bowel or bladder incontinence.   Functional Symptoms: See rooming notes  Adverse effects of medications: Nausea, vomiting intermittently.  Takes zofran prn. Sweating.  Constipation taking Apple cider vinegar capsule once per day helps keep her regular.    Current treatment efficacy: Good. See medication list.  Current treatment compliance: Good. Keeping follow-up appointments, taking medication as prescribed. Recently completed PT for cervical spine.  Exercising at home with light weights and walking.      Since last visit, states she had 2 close friends relatives that were shot and murdered.  [place] cancer has returned.  She states she is tired.  She is cleaning out her cousins apartment as her cousin has Lewy Body Dementia and cannot live  "independently.  She states this has affected her pain in her whole body as she has more stress and activity.  She has not started her duloxetine dose as she hasn't had time, we review the benefit for pain and mood.  She states she is spending 6-7 hours helping family per day and no time for herself.    She will have CT sinuses and head tomorrow. She saw Stacia Thrasher NP on 10/05/21 reviewed today:  \"Diagnoses and all orders for this visit:    Phlegm in throat  - AMB CONSULT TO AUDIOLOGY AND ENT; Future  - CT SINUS WO; Future    Chronic maxillary sinusitis    SOB (shortness of breath)  - XR CHEST 2 VIEWS PA AND LATERAL; Future    Anxiety  - diazePAM (VALIUM) 5 mg tablet; Take 1 Tablet (5 mg) by mouth one time for 1 dose.    Chronic GERD  - omeprazole (PRILOSEC) 20 mg Delayed-Release capsule; Take 1 Capsule (20 mg) by mouth 2 times daily before meals.    Pituitary macroadenoma (HC)  - CT HEAD BRAIN PITUITARY W; Future    Tinea corporis    Actinic keratosis    Set up the ct scan and echocardiogram appointments.    Chest xrays done today.    Start omeprazole 20 mg bid for suspicion of gerd.    Lamisil bid to the tinea rash on the right lower neck/upper back.     Total time preparing to see this patient, face-to-face time, and coordinating care time on the same calendar date: 30 minutes..    Stacia Thrasher CNP ................10/5/2021.....1:34 PM. \"    She states her pain rating today is 7/10.  She states left hip is so swollen and sore from bursitis.  She states it is difficult to walk with this pain.  She states once she starts walking it is \"fine.\"  She doesn't want to do injection right now.  She states she is using topical spray which only is short term relief.  She states she doesn't like using gel as it is messy. She had bursa injections which was last done 01/16/2019 which she states lasted a couple weeks at the most and doesn't want to repeat at this time.  Reports low back pain as stable.    She states no " "changes in her left hand/arm - she states it is numb/tingling and weaker than the right hand as her sensation is different. She states she doesn't want to have surgery with Dr. Mccollum.   She is right handed.    She has tried gabapentin, amitriptyline, lyrica with side effects.  She denies other neuropathic medication trials including duloxetine, off label amantadine or namenda.  She has not taken PEA or CBD oil.   She stopped topamax 50 mg at night as it was too sedating into the next day, does not want to restart at 25 mg at this time.  She did find out that acupuncture is covered by insurance this year, interested in this but hasn't started due to COVID.       She finds spray lidocaine helpful on her feet to reduce the burning, using at night with feet elevated.  She states she also sprays on her bursa.     Current Outpatient Medications   Medication     acetaminophen (TYLENOL) 500 MG tablet     aspirin (ASA) 81 MG EC tablet     atorvastatin (LIPITOR) 40 MG tablet     furosemide (LASIX) 20 MG tablet     ibuprofen (ADVIL,MOTRIN) 800 MG tablet     insulin glargine (LANTUS SOLOSTAR) 100 UNIT/ML pen     levothyroxine (SYNTHROID, LEVOTHROID) 25 MCG tablet     lidocaine (LIDODERM) 5 %     losartan (COZAAR) 100 MG tablet     metFORMIN (GLUCOPHAGE) 500 MG tablet     [START ON 11/11/2021] methadone (DOLOPHINE) 5 MG tablet     omeprazole (PRILOSEC) 20 MG DR capsule     ondansetron (ZOFRAN) 4 MG tablet     oxyCODONE-acetaminophen (PERCOCET)  MG per tablet     CONTOUR TEST STRIPS Strp     MICROLET LANCET Misc     pen needle, diabetic 31 gauge x 5/16\" Ndle     polyvinyl alcohol (LIQUIFILM TEARS) 1.4 % ophthalmic solution     No current facility-administered medications for this visit.     Review of Systems  A 12 point ROS was completed and was negative except for above listed and ongoing nausea, intermittent vomiting.  Weight stable, denies fever.  Sleep interrupted due to stress and pain.        Objective:   /62 (BP " Location: Left arm, Patient Position: Sitting, Cuff Size: Adult Regular)   Pulse 64     Physical Exam  Constitutional- General appearance: Appears tired. Well developed, comfortable, overweight, and appearance reflects stated age.   No acute distress or pain behaviors noted.  Presents alone today.  Psychiatric- Judgment and insight: Normal.  Speech: Normal rhythm.  Thought process: Normal.  No abnormal thoughts reported. Alert & Oriented to person, place, and time.  Recent and remote memory: Normal.  Observed mood: Concerned  Respiratory- Breathing is non-labored; normal rhythm and rate.  No shortness of breath during visit.  Dermatologic- Exposed skin is clean, dry, and intact to inspection.  Musculoskeletal- Gait and station: Ambulates independently with antalgia. Able to sit to stand with some difficulty.    *Opioid [place] Precautions:    UDT/Blood - 1/21/21 results as expected  Opioid Agreement - 01/21/2021  Pharmacy- as documented    [place]  Reviewed - 10/14/2021 as expected.  Pill Count - 01/15/20 appropriate  Psychological evaluation - n/a  MME - 76 + 15 prn from Endocet   Pharmacogenetic testing - n/a  EKG 10/01/19, NSR and QT/QTc 400/419 ms    Imaging:  EMG 03/17/2021:  Impression    1.  No evidence of mononeuropathy by nerve conduction studies    2.  Subtle mild chronic motor changes in the ulnar innervated   hand muscles is nonspecific.  Could be seen with subtle motor   ulnar neuropathy.  Localization unclear as nerve conduction   studies are normal.    CT Head Brain without Contrast 01/2/2020  IMPRESSION:  1.  Unchanged postoperative appearance when compared to 08/28/2018. No new acute  intracranial abnormality.     2.  Unchanged small amount of enhancing tissue in the right aspect of the sella  which may represent normal residual pituitary tissue. No convincing evidence of  residual/recurrent tumor.    EXAM: XR HIP 2 OR 3 VIEWS W PELVIS LEFT  LOCATION: [facility]  DATE/TIME:  12/23/2019 11:54 AM    INDICATION: Osteoarthritis Of Spine With Radiculopathy, Lumbar Region  COMPARISON: 08/15/2017    Findings:  The symphysis pubis and pubic rami are normal. The hips demonstrate a mild  degree of symmetric degenerative change characterized by central loss of  articular cartilage, minor marginal spurring and minimal sclerosis. Targeted  attention of the left hip is negative for fracture. There is a 2 cm focus of  circumscribed calcification in the subcutaneous soft tissues of the  anterolateral aspect of the left proximal thigh. These findings are stable  dating back to 2017 and likely represent an area of old osteoporosis. The sacral  wings, sacroiliac joints and iliac wings are negative.    IMPRESSION: Negative for acute bony abnormality. Mild symmetric degenerative  changes in both hips.     CT Spine Lumbar without Contrast 10/19/20  IMPRESSION:    1.  Postsurgical changes right L5 hemilaminectomy.  2.  Sclerotic lesion within the left aspect of the sacrum, relatively stable  since 05/03/2010, which most consistent with a bone island.  3.  No evidence of acute fracture or subluxation of the lumbar spine by CT  imaging.  4.  Lumbar spondylosis with level by level analysis as described above    Assessment:   Citlalli Noonan presents with lower extremity pain secondary to diabetic peripheral neuropathy, cervical pain with CT scan demonstrating multiple levels of moderate stenosis and degeneration, lumbar pain due to degeneration and left leg radiculopathy treated with methadone.  She had updated lumbar CT and we have reviewed lumbar facet injection treatment as needed.  For neuropathic pain, discuss failure of gabapentin, amitriptyline and lyrica, and intolerance to 50 mg topamax (may consider 25 mg trial in the future until she can tolerate higher dose).  Recommend a trial of duloxetine for chronic pain and mood benefit; she is educated on this medication and possible risks/side effects.       Plan:   Continue methadone 15 mg in the am and 10 mg at night.    Check with your pharmacy that all prescriptions are on your profile. Call the pharmacy a week before you want to fill the prescription as stock may vary and the pharmacy may need to order the medication for you. I reserve the right to cancel the electronic prescription at the pharmacy in between appointments and would contact you with an explanation if this were to occur.  Refills is at pharmacy to fill now, next month refill sent for 11/11/21.  Continue Endocet 10/325 mg 1-2 daily as needed for severe or breakthrough pain.  Refill sent today for #60 tabs to last 30 days, often this refill lasts many months  For left hip bursitis, use Voltaren (diclofenac) 1% gel apply 4 grams at least twice a day, up to 4 times a day. Wash hands after using.  May take ibuprofen 800 mg 1 tab with food up to 3 times a day as needed.  May repeat injection as needed.  May start duloxetine 1 capsule daily as prescribed for nerve pain - start in evening, call with any side effect concerns.  Follow-up in 8 weeks with Shannan Gagnon PA-C  [facility]  [address]  [place] [address]  Ph: [phone]  Fax: [phone]

## 2021-10-14 ENCOUNTER — OFFICE VISIT (OUTPATIENT)
Dept: PALLIATIVE MEDICINE | Facility: OTHER | Age: 76
End: 2021-10-14
Payer: COMMERCIAL

## 2021-10-14 VITALS — HEART RATE: 64 BPM | DIASTOLIC BLOOD PRESSURE: 62 MMHG | SYSTOLIC BLOOD PRESSURE: 135 MMHG

## 2021-10-14 DIAGNOSIS — M25.552 HIP PAIN, LEFT: ICD-10-CM

## 2021-10-14 DIAGNOSIS — F11.90 CHRONIC, CONTINUOUS USE OF OPIOIDS: ICD-10-CM

## 2021-10-14 DIAGNOSIS — M51.379 DEGENERATION OF LUMBAR OR LUMBOSACRAL INTERVERTEBRAL DISC: ICD-10-CM

## 2021-10-14 DIAGNOSIS — E11.42 DIABETIC PERIPHERAL NEUROPATHY (H): ICD-10-CM

## 2021-10-14 DIAGNOSIS — G89.4 CHRONIC PAIN SYNDROME: Primary | ICD-10-CM

## 2021-10-14 PROCEDURE — 99213 OFFICE O/P EST LOW 20 MIN: CPT | Performed by: PHYSICIAN ASSISTANT

## 2021-10-14 PROCEDURE — G0463 HOSPITAL OUTPT CLINIC VISIT: HCPCS

## 2021-10-14 RX ORDER — ACETAMINOPHEN 500 MG
1000 TABLET ORAL
COMMUNITY

## 2021-10-14 RX ORDER — OXYCODONE AND ACETAMINOPHEN 10; 325 MG/1; MG/1
1 TABLET ORAL 2 TIMES DAILY PRN
Qty: 60 TABLET | Refills: 0 | Status: SHIPPED | OUTPATIENT
Start: 2021-10-14 | End: 2021-11-13

## 2021-10-14 RX ORDER — LOSARTAN POTASSIUM 100 MG/1
100 TABLET ORAL
COMMUNITY
Start: 2020-12-18 | End: 2021-10-14

## 2021-10-14 RX ORDER — ONDANSETRON 4 MG/1
4 TABLET, FILM COATED ORAL
COMMUNITY
Start: 2021-02-25 | End: 2021-10-14

## 2021-10-14 RX ORDER — PROMETHAZINE HYDROCHLORIDE 25 MG/1
25 TABLET ORAL
COMMUNITY
End: 2021-10-14

## 2021-10-14 RX ORDER — LIRAGLUTIDE 6 MG/ML
INJECTION SUBCUTANEOUS
COMMUNITY
Start: 2020-12-20 | End: 2021-10-14

## 2021-10-14 RX ORDER — METHADONE HYDROCHLORIDE 5 MG/1
TABLET ORAL
Qty: 140 TABLET | Refills: 0 | Status: SHIPPED | OUTPATIENT
Start: 2021-11-11 | End: 2021-11-16

## 2021-10-14 RX ORDER — LIDOCAINE 50 MG/G
1 PATCH TOPICAL
COMMUNITY
End: 2021-10-14

## 2021-10-14 RX ORDER — INSULIN GLARGINE AND LIXISENATIDE 100; 33 U/ML; UG/ML
INJECTION, SOLUTION SUBCUTANEOUS
COMMUNITY
Start: 2020-12-22 | End: 2021-10-14

## 2021-10-14 RX ORDER — ATORVASTATIN CALCIUM 40 MG/1
40 TABLET, FILM COATED ORAL
COMMUNITY
Start: 2020-12-18 | End: 2021-10-14

## 2021-10-14 RX ORDER — FUROSEMIDE 20 MG
20 TABLET ORAL
COMMUNITY
Start: 2021-08-16 | End: 2021-10-14

## 2021-10-14 RX ORDER — LEVOTHYROXINE SODIUM 25 UG/1
25 TABLET ORAL
COMMUNITY
Start: 2020-12-18 | End: 2021-10-14

## 2021-10-14 RX ORDER — DULOXETIN HYDROCHLORIDE 30 MG/1
30 CAPSULE, DELAYED RELEASE ORAL
COMMUNITY
Start: 2021-07-20 | End: 2021-10-14

## 2021-10-14 ASSESSMENT — PAIN SCALES - GENERAL: PAINLEVEL: SEVERE PAIN (7)

## 2021-10-14 NOTE — PROGRESS NOTES
Pt is being seen today by AYLIN Genao, for refill and f/u of multisite pain, lt side.    Pain score 7  Constant   What does your pain like feel during a flare? Achy, tingling  Does the pain interfere with:  Work ----- NA  Walking ------ yes  Sleep ------- yes  Daily activities ------yes  Relationships -------no  F=6    ASHLEYT/TIGRE- 01/2021    methadone at 0930 this am

## 2021-10-14 NOTE — PATIENT INSTRUCTIONS
Continue methadone 15 mg in the am and 10 mg at night.    Check with your pharmacy that all prescriptions are on your profile. Call the pharmacy a week before you want to fill the prescription as stock may vary and the pharmacy may need to order the medication for you. I reserve the right to cancel the electronic prescription at the pharmacy in between appointments and would contact you with an explanation if this were to occur.  Refills is at pharmacy to fill now, next month refill sent for 11/11/21.  Continue Endocet 10/325 mg 1-2 daily as needed for severe or breakthrough pain.  Refill sent today for #60 tabs to last 30 days, often this refill lasts many months  For left hip bursitis, use Voltaren (diclofenac) 1% gel apply 4 grams at least twice a day, up to 4 times a day. Wash hands after using.  May take ibuprofen 800 mg 1 tab with food up to 3 times a day as needed.  May repeat injection as needed.  May start duloxetine 1 capsule daily as prescribed for nerve pain - start in evening, call with any side effect concerns.  Follow-up in 8 weeks with Shannan haq

## 2021-10-14 NOTE — LETTER
10/14/2021         RE: Citlalli Noonan  [address]  [facility] [address]        Dear Colleague,    Thank you for referring your patient, Citlalli Noonan, to the [facility]. Please see a copy of my visit note below.    PAIN CLINIC PROGRESS NOTE    Subjective:   Citlalli Noonan presents for evaluation of low back pain.  She has lumbar degeneration, OA bilateral knees, and diabetic peripheral neuropathy.  She has continued methadone as this helps pain as weaning off medication caused uncontrolled pain and poor sleep and trial of other extended release opioids did not help reduce neuropathic pain.  She was also to continue Endocet, topicals, and muscle relaxants.  She has been educated on lumbar sympathetic block for lower extremity neuropathy.    Major issues:  1. Chronic, continuous use of opioids    2. Chronic pain syndrome    3. Lumbar Disc Degeneration    4. Lumbar radicular pain    5. Diabetic peripheral neuropathy (H)       Pain Rating: see rooming note.  Severe Pain (7)  Radiation of pain: left sided leg pain ongoing and chronic to left posterior calf  Gait disturbance: occasionally uses a cane, denies recent falls.   Exacerbating factors: Stress, activity including walking or standing too long, moving, sleeping, weather  Relieving factors: medications, walking, rest.  Associated Symptoms: Numbness in bilateral feet/legs (neuropathy), night pain. Denies fever/chills, weakness, bowel or bladder incontinence.   Functional Symptoms: See rooming notes  Adverse effects of medications: Nausea, vomiting intermittently.  Takes zofran prn. Sweating.  Constipation taking Apple cider vinegar capsule once per day helps keep her regular.    Current treatment efficacy: Good. See medication list.  Current treatment compliance: Good. Keeping follow-up appointments, taking medication as prescribed. Recently completed PT for cervical spine.  Exercising at home with light weights and walking.   "    Since last visit, states she had 2 close friends relatives that were shot and murdered.  [place] cancer has returned.  She states she is tired.  She is cleaning out her cousins apartment as her cousin has Lewy Body Dementia and cannot live independently.  She states this has affected her pain in her whole body as she has more stress and activity.  She has not started her duloxetine dose as she hasn't had time, we review the benefit for pain and mood.  She states she is spending 6-7 hours helping family per day and no time for herself.    She will have CT sinuses and head tomorrow. She saw Stacia Thrasher NP on 10/05/21 reviewed today:  \"Diagnoses and all orders for this visit:    Phlegm in throat  - AMB CONSULT TO AUDIOLOGY AND ENT; Future  - CT SINUS WO; Future    Chronic maxillary sinusitis    SOB (shortness of breath)  - XR CHEST 2 VIEWS PA AND LATERAL; Future    Anxiety  - diazePAM (VALIUM) 5 mg tablet; Take 1 Tablet (5 mg) by mouth one time for 1 dose.    Chronic GERD  - omeprazole (PRILOSEC) 20 mg Delayed-Release capsule; Take 1 Capsule (20 mg) by mouth 2 times daily before meals.    Pituitary macroadenoma (HC)  - CT HEAD BRAIN PITUITARY W; Future    Tinea corporis    Actinic keratosis    Set up the ct scan and echocardiogram appointments.    Chest xrays done today.    Start omeprazole 20 mg bid for suspicion of gerd.    Lamisil bid to the tinea rash on the right lower neck/upper back.     Total time preparing to see this patient, face-to-face time, and coordinating care time on the same calendar date: 30 minutes..    Stacia Thrasher CNP ................10/5/2021.....1:34 PM. \"    She states her pain rating today is 7/10.  She states left hip is so swollen and sore from bursitis.  She states it is difficult to walk with this pain.  She states once she starts walking it is \"fine.\"  She doesn't want to do injection right now.  She states she is using topical spray which only is short term relief.  She states " "she doesn't like using gel as it is messy. She had bursa injections which was last done 01/16/2019 which she states lasted a couple weeks at the most and doesn't want to repeat at this time.  Reports low back pain as stable.    She states no changes in her left hand/arm - she states it is numb/tingling and weaker than the right hand as her sensation is different. She states she doesn't want to have surgery with Dr. Mccollum.   She is right handed.    She has tried gabapentin, amitriptyline, lyrica with side effects.  She denies other neuropathic medication trials including duloxetine, off label amantadine or namenda.  She has not taken PEA or CBD oil.   She stopped topamax 50 mg at night as it was too sedating into the next day, does not want to restart at 25 mg at this time.  She did find out that acupuncture is covered by insurance this year, interested in this but hasn't started due to COVID.       She finds spray lidocaine helpful on her feet to reduce the burning, using at night with feet elevated.  She states she also sprays on her bursa.     Current Outpatient Medications   Medication     acetaminophen (TYLENOL) 500 MG tablet     aspirin (ASA) 81 MG EC tablet     atorvastatin (LIPITOR) 40 MG tablet     furosemide (LASIX) 20 MG tablet     ibuprofen (ADVIL,MOTRIN) 800 MG tablet     insulin glargine (LANTUS SOLOSTAR) 100 UNIT/ML pen     levothyroxine (SYNTHROID, LEVOTHROID) 25 MCG tablet     lidocaine (LIDODERM) 5 %     losartan (COZAAR) 100 MG tablet     metFORMIN (GLUCOPHAGE) 500 MG tablet     [START ON 11/11/2021] methadone (DOLOPHINE) 5 MG tablet     omeprazole (PRILOSEC) 20 MG DR capsule     ondansetron (ZOFRAN) 4 MG tablet     oxyCODONE-acetaminophen (PERCOCET)  MG per tablet     CONTOUR TEST STRIPS Strp     MICROLET LANCET Misc     pen needle, diabetic 31 gauge x 5/16\" Ndle     polyvinyl alcohol (LIQUIFILM TEARS) 1.4 % ophthalmic solution     No current facility-administered medications for this visit. "     Review of Systems  A 12 point ROS was completed and was negative except for above listed and ongoing nausea, intermittent vomiting.  Weight stable, denies fever.  Sleep interrupted due to stress and pain.        Objective:   /62 (BP Location: Left arm, Patient Position: Sitting, Cuff Size: Adult Regular)   Pulse 64     Physical Exam  Constitutional- General appearance: Appears tired. Well developed, comfortable, overweight, and appearance reflects stated age.   No acute distress or pain behaviors noted.  Presents alone today.  Psychiatric- Judgment and insight: Normal.  Speech: Normal rhythm.  Thought process: Normal.  No abnormal thoughts reported. Alert & Oriented to person, place, and time.  Recent and remote memory: Normal.  Observed mood: Concerned  Respiratory- Breathing is non-labored; normal rhythm and rate.  No shortness of breath during visit.  Dermatologic- Exposed skin is clean, dry, and intact to inspection.  Musculoskeletal- Gait and station: Ambulates independently with antalgia. Able to sit to stand with some difficulty.    *Opioid [place] Precautions:    UDT/Blood - 1/21/21 results as expected  Opioid Agreement - 01/21/2021  Pharmacy- as documented    [place]  Reviewed - 10/14/2021 as expected.  Pill Count - 01/15/20 appropriate  Psychological evaluation - n/a  MME - 76 + 15 prn from Endocet   Pharmacogenetic testing - n/a  EKG 10/01/19, NSR and QT/QTc 400/419 ms    Imaging:  EMG 03/17/2021:  Impression    1.  No evidence of mononeuropathy by nerve conduction studies    2.  Subtle mild chronic motor changes in the ulnar innervated   hand muscles is nonspecific.  Could be seen with subtle motor   ulnar neuropathy.  Localization unclear as nerve conduction   studies are normal.    CT Head Brain without Contrast 01/2/2020  IMPRESSION:  1.  Unchanged postoperative appearance when compared to 08/28/2018. No new acute  intracranial abnormality.     2.  Unchanged small amount of enhancing tissue  in the right aspect of the sella  which may represent normal residual pituitary tissue. No convincing evidence of  residual/recurrent tumor.    EXAM: XR HIP 2 OR 3 VIEWS W PELVIS LEFT  LOCATION: [facility]  DATE/TIME: 12/23/2019 11:54 AM    INDICATION: Osteoarthritis Of Spine With Radiculopathy, Lumbar Region  COMPARISON: 08/15/2017    Findings:  The symphysis pubis and pubic rami are normal. The hips demonstrate a mild  degree of symmetric degenerative change characterized by central loss of  articular cartilage, minor marginal spurring and minimal sclerosis. Targeted  attention of the left hip is negative for fracture. There is a 2 cm focus of  circumscribed calcification in the subcutaneous soft tissues of the  anterolateral aspect of the left proximal thigh. These findings are stable  dating back to 2017 and likely represent an area of old osteoporosis. The sacral  wings, sacroiliac joints and iliac wings are negative.    IMPRESSION: Negative for acute bony abnormality. Mild symmetric degenerative  changes in both hips.     CT Spine Lumbar without Contrast 10/19/20  IMPRESSION:    1.  Postsurgical changes right L5 hemilaminectomy.  2.  Sclerotic lesion within the left aspect of the sacrum, relatively stable  since 05/03/2010, which most consistent with a bone island.  3.  No evidence of acute fracture or subluxation of the lumbar spine by CT  imaging.  4.  Lumbar spondylosis with level by level analysis as described above    Assessment:   Citlalli Noonan presents with lower extremity pain secondary to diabetic peripheral neuropathy, cervical pain with CT scan demonstrating multiple levels of moderate stenosis and degeneration, lumbar pain due to degeneration and left leg radiculopathy treated with methadone.  She had updated lumbar CT and we have reviewed lumbar facet injection treatment as needed.  For neuropathic pain, discuss failure of gabapentin, amitriptyline and lyrica, and  intolerance to 50 mg topamax (may consider 25 mg trial in the future until she can tolerate higher dose).  Recommend a trial of duloxetine for chronic pain and mood benefit; she is educated on this medication and possible risks/side effects.      Plan:   Continue methadone 15 mg in the am and 10 mg at night.    Check with your pharmacy that all prescriptions are on your profile. Call the pharmacy a week before you want to fill the prescription as stock may vary and the pharmacy may need to order the medication for you. I reserve the right to cancel the electronic prescription at the pharmacy in between appointments and would contact you with an explanation if this were to occur.  Refills is at pharmacy to fill now, next month refill sent for 11/11/21.  Continue Endocet 10/325 mg 1-2 daily as needed for severe or breakthrough pain.  Refill sent today for #60 tabs to last 30 days, often this refill lasts many months  For left hip bursitis, use Voltaren (diclofenac) 1% gel apply 4 grams at least twice a day, up to 4 times a day. Wash hands after using.  May take ibuprofen 800 mg 1 tab with food up to 3 times a day as needed.  May repeat injection as needed.  May start duloxetine 1 capsule daily as prescribed for nerve pain - start in evening, call with any side effect concerns.  Follow-up in 8 weeks with Shannan Gagnon PA-C  [facility]  [address]  [place] [address]  Ph: [phone]  Fax: [phone]          Pt is being seen today by AYLIN Genao, for refill and f/u of multisite pain, lt side.    Pain score 7  Constant   What does your pain like feel during a flare? Achy, tingling  Does the pain interfere with:  Work ----- NA  Walking ------ yes  Sleep ------- yes  Daily activities ------yes  Relationships -------no  F=6    UDT/CSA- 01/2021    methadone at 0930 this am        Again, thank you for allowing me to participate in the care  of your patient.        Sincerely,        Shannan Gagnon PA-C

## 2021-11-15 NOTE — PROGRESS NOTES
"Video Start Time: 338 PM    PAIN CLINIC PROGRESS NOTE    Subjective:   Citlalli Noonan presents for evaluation of low back pain.  She has lumbar degeneration, OA bilateral knees, and diabetic peripheral neuropathy.  She has continued methadone as this helps pain as weaning off medication caused uncontrolled pain and poor sleep and trial of other extended release opioids did not help reduce neuropathic pain.  She was also to continue Endocet, topicals, and muscle relaxants.  She has been educated on lumbar sympathetic block for lower extremity neuropathy.    Major issues:  1. Chronic, continuous use of opioids    2. Chronic pain syndrome    3. Lumbar Disc Degeneration    4. Lumbar radicular pain    5. Diabetic peripheral neuropathy (H)       Pain Rating: see rooming note.  Extreme Pain (8).  See CMA Note  Radiation of pain: left sided leg pain ongoing and chronic to left posterior calf  Gait disturbance: occasionally uses a cane, denies recent falls.   Exacerbating factors: Stress, activity including walking or standing too long, moving, sleeping, weather  Relieving factors: medications, walking, rest.  Associated Symptoms: Numbness in bilateral feet/legs (neuropathy), night pain. Denies fever/chills, weakness, bowel or bladder incontinence.   Functional Symptoms: See rooming notes  Adverse effects of medications: Nausea, vomiting intermittently.  Takes zofran prn. Sweating.  Constipation taking Apple cider vinegar capsule once per day helps keep her regular.    Current treatment efficacy: Good. See medication list.  Current treatment compliance: Good. Keeping follow-up appointments, taking medication as prescribed. Recently completed PT for cervical spine.  Exercising at home with light weights and walking.      Since last visit, bryce Thrasher NP on 10/21/21 reviewed today:  \"Diagnoses and all orders for this visit:    Type 2 diabetes mellitus with diabetic polyneuropathy, with long-term current use of insulin " "(HC)  - HEMOGLOBIN A1C MONITORING (POCT); Future  - HEMOGLOBIN A1C MONITORING (POCT)    Hyperlipidemia  - LIPID PANEL; Future  - LIPID PANEL  - atorvastatin (LIPITOR) 40 mg tablet; Take 1 Tablet (40 mg) by mouth once daily.    Post-nasal drainage    Uncontrolled type 2 diabetes mellitus with hyperglycemia (HC)  - metFORMIN (GLUCOPHAGE) 1,000 mg tablet; Take 1 Tablet (1,000 mg) by mouth 2 times daily with meals.    Acquired hypothyroidism  - levothyroxine (SYNTHROID) 25 mcg tablet; Take 1 Tablet (25 mcg) by mouth before breakfast.    Type 2 diabetes mellitus without complication, with long-term current use of insulin (HC)  - losartan (COZAAR) 100 mg tablet; Take 1 Tablet (100 mg) by mouth once daily.    Venous insufficiency  - furosemide (LASIX) 20 mg tablet; Take 1 Tablet (20 mg) by mouth every morning.    Need for prophylactic vaccination and inoculation against influenza  - Fluad FLU VACCINE =>65 YRS PRESERV FREE QUADRIVALENT AIIV4 [180007]    DMV forms completed. Follow up pending lab results.    Patient will be seeing ENT for follow up chronic sinus drainage. She was unable to complete the ct scan of the sinuses and pituitary areas due to choking on phlegm when lying down for the ct scans. cxr negative. She will set up the echocardiogram for follow up on dyspnea on exertion.\"     She tried duloxetine 30 mg and caused her to feel sick with vomiting and shaky feeling. She stopped taking it after 1 week. She would consider trying a lower dose.  She states she has been worried about her cousin Chloe who is now in a memory care facility. She had to empty her apartment and this was distressing to her.      She states her sleep is worse.  She states she hasn't been doing much and going to store late at night and gets her mail at 11 pm.  She states she avoids people. She did not speak with PCP about mood.  She declines behavioral health referral.      She states no changes in her pain symptoms.  She has tried " "gabapentin, amitriptyline, lyrica with side effects.  She denies other neuropathic medication trials including duloxetine, off label amantadine or namenda.  She has not taken PEA or CBD oil.   She stopped topamax 50 mg at night as it was too sedating into the next day, does not want to restart at 25 mg at this time.  She did find out that acupuncture is covered by insurance this year, interested in this but hasn't started due to COVID.       She finds spray lidocaine helpful on her feet to reduce the burning, using at night with feet elevated.  She states she also sprays on her bursa. Feet are swollen for sitting prolonged periods and going through her cousin's paperwork sometimes 10-14 hours per day.      Current Outpatient Medications   Medication     acetaminophen (TYLENOL) 500 MG tablet     aspirin (ASA) 81 MG EC tablet     atorvastatin (LIPITOR) 40 MG tablet     CONTOUR TEST STRIPS Strp     furosemide (LASIX) 20 MG tablet     ibuprofen (ADVIL,MOTRIN) 800 MG tablet     insulin glargine (LANTUS SOLOSTAR) 100 UNIT/ML pen     levothyroxine (SYNTHROID, LEVOTHROID) 25 MCG tablet     lidocaine (LIDODERM) 5 %     losartan (COZAAR) 100 MG tablet     metFORMIN (GLUCOPHAGE) 500 MG tablet     methadone (DOLOPHINE) 5 MG tablet     MICROLET LANCET Misc     omeprazole (PRILOSEC) 20 MG DR capsule     ondansetron (ZOFRAN) 4 MG tablet     pen needle, diabetic 31 gauge x 5/16\" Ndle     polyvinyl alcohol (LIQUIFILM TEARS) 1.4 % ophthalmic solution     No current facility-administered medications for this visit.     Review of Systems  A 12 point ROS was completed and was negative except for above listed and ongoing nausea, intermittent vomiting.  Weight stable, denies fever.  Sleep interrupted due to stress and pain.      Objective:   There were no vitals taken for this visit.    Physical Exam  Constitutional- General appearance: Appears tired. Well developed, comfortable, obese, and appearance reflects stated age.   No acute " distress or pain behaviors noted.  Presents alone today.  Psychiatric- Judgment and insight: Normal.  Speech: Normal rhythm.  Thought process: Normal.  No abnormal thoughts reported. Alert & Oriented to person, place, and time.  Recent and remote memory: Normal.  Observed mood: Concerned, tearful  Respiratory- Breathing is non-labored; normal rhythm and rate.  No shortness of breath during visit.  Dermatologic- Exposed skin is clean, dry, and intact to inspection.  Musculoskeletal- Gait and station: Seated for entire visit.    *Opioid [place] Precautions:    UDT/Blood - 1/21/21 results as expected  Opioid Agreement - 01/21/2021  Pharmacy- as documented    [place]  Reviewed - 11/16/2021 as expected.  Pill Count - 01/15/20 appropriate  Psychological evaluation - n/a  MME - 76 + 15 prn from Endocet   Pharmacogenetic testing - n/a  EKG 10/01/19, NSR and QT/QTc 400/419 ms    Imaging:  EMG 03/17/2021:  Impression    1.  No evidence of mononeuropathy by nerve conduction studies    2.  Subtle mild chronic motor changes in the ulnar innervated   hand muscles is nonspecific.  Could be seen with subtle motor   ulnar neuropathy.  Localization unclear as nerve conduction   studies are normal.    CT Head Brain without Contrast 01/2/2020  IMPRESSION:  1.  Unchanged postoperative appearance when compared to 08/28/2018. No new acute  intracranial abnormality.     2.  Unchanged small amount of enhancing tissue in the right aspect of the sella  which may represent normal residual pituitary tissue. No convincing evidence of  residual/recurrent tumor.    EXAM: XR HIP 2 OR 3 VIEWS W PELVIS LEFT  LOCATION: [facility]  DATE/TIME: 12/23/2019 11:54 AM    INDICATION: Osteoarthritis Of Spine With Radiculopathy, Lumbar Region  COMPARISON: 08/15/2017    Findings:  The symphysis pubis and pubic rami are normal. The hips demonstrate a mild  degree of symmetric degenerative change characterized by central loss of  articular  cartilage, minor marginal spurring and minimal sclerosis. Targeted  attention of the left hip is negative for fracture. There is a 2 cm focus of  circumscribed calcification in the subcutaneous soft tissues of the  anterolateral aspect of the left proximal thigh. These findings are stable  dating back to 2017 and likely represent an area of old osteoporosis. The sacral  wings, sacroiliac joints and iliac wings are negative.    IMPRESSION: Negative for acute bony abnormality. Mild symmetric degenerative  changes in both hips.     CT Spine Lumbar without Contrast 10/19/20  IMPRESSION:    1.  Postsurgical changes right L5 hemilaminectomy.  2.  Sclerotic lesion within the left aspect of the sacrum, relatively stable  since 05/03/2010, which most consistent with a bone island.  3.  No evidence of acute fracture or subluxation of the lumbar spine by CT  imaging.  4.  Lumbar spondylosis with level by level analysis as described above    Assessment:   Citlalli Noonan presents with lower extremity pain secondary to diabetic peripheral neuropathy, cervical pain with CT scan demonstrating multiple levels of moderate stenosis and degeneration, lumbar pain due to degeneration and left leg radiculopathy treated with methadone.  She had updated lumbar CT and we have reviewed lumbar facet injection treatment as needed.  For neuropathic pain, discuss failure of gabapentin, amitriptyline and lyrica, and intolerance to 50 mg topamax (may consider 25 mg trial in the future until she can tolerate higher dose).  Recommend a trial of duloxetine 20 mg for chronic pain and mood benefit; she is educated on this medication and possible risks/side effects.      Phone call to Stacia Thrasher NP on 455 PM today to notify of discussion today and to see if she would take over opioid management.  Review this provider's last day 12/29/21 and her MME is within CDC guidelines.      Plan:   Continue methadone 15 mg in the am and 10 mg at night.     Check with your pharmacy that all prescriptions are on your profile. Call the pharmacy a week before you want to fill the prescription as stock may vary and the pharmacy may need to order the medication for you. I reserve the right to cancel the electronic prescription at the pharmacy in between appointments and would contact you with an explanation if this were to occur.  Refills sent for 12/09/21   Continue Endocet 10/325 mg 1-2 daily as needed for severe or breakthrough pain.  Refill not needed yet  For left hip bursitis, use Voltaren (diclofenac) 1% gel apply 4 grams at least twice a day, up to 4 times a day. Wash hands after using.  May take ibuprofen 800 mg 1 tab with food up to 3 times a day as needed.  May repeat injection as needed.  May restart duloxetine 20 mg capsule 1 every other day for first 1-2 weeks, then take daily.  Follow-up with PCP Stacia Thrasher NP for med management - you may return to the pain center as needed for increased pain.  My last day is 12/29/21.       Shannan Gagnon PA-C  [facility]  [address]  [place] [address]  Ph: [phone]  Fax: [phone]    Video-Visit Details    Type of service:  Video Visit    Video End Time:4:01 PM    Originating Location (pt. Location): Home    Distant Location (provider location):  [facility]     Platform used for Video Visit: Donita

## 2021-11-16 ENCOUNTER — VIRTUAL VISIT (OUTPATIENT)
Dept: PALLIATIVE MEDICINE | Facility: OTHER | Age: 76
End: 2021-11-16
Payer: COMMERCIAL

## 2021-11-16 DIAGNOSIS — F11.90 CHRONIC, CONTINUOUS USE OF OPIOIDS: ICD-10-CM

## 2021-11-16 DIAGNOSIS — E11.42 DIABETIC PERIPHERAL NEUROPATHY (H): ICD-10-CM

## 2021-11-16 DIAGNOSIS — M51.379 DEGENERATION OF LUMBAR OR LUMBOSACRAL INTERVERTEBRAL DISC: ICD-10-CM

## 2021-11-16 DIAGNOSIS — G89.4 CHRONIC PAIN SYNDROME: Primary | ICD-10-CM

## 2021-11-16 DIAGNOSIS — M48.02 CERVICAL STENOSIS OF SPINAL CANAL: ICD-10-CM

## 2021-11-16 PROCEDURE — 99213 OFFICE O/P EST LOW 20 MIN: CPT | Mod: 95 | Performed by: PHYSICIAN ASSISTANT

## 2021-11-16 PROCEDURE — G0463 HOSPITAL OUTPT CLINIC VISIT: HCPCS | Mod: PN,RTG | Performed by: PHYSICIAN ASSISTANT

## 2021-11-16 RX ORDER — DULOXETIN HYDROCHLORIDE 20 MG/1
CAPSULE, DELAYED RELEASE ORAL
Qty: 21 CAPSULE | Refills: 0 | Status: SHIPPED | OUTPATIENT
Start: 2021-11-16 | End: 2021-12-14

## 2021-11-16 RX ORDER — OXYCODONE AND ACETAMINOPHEN 10; 325 MG/1; MG/1
1 TABLET ORAL 2 TIMES DAILY PRN
Qty: 60 TABLET | Refills: 0 | Status: CANCELLED | OUTPATIENT
Start: 2021-11-16 | End: 2021-12-16

## 2021-11-16 RX ORDER — METHADONE HYDROCHLORIDE 5 MG/1
TABLET ORAL
Qty: 140 TABLET | Refills: 0 | Status: SHIPPED | OUTPATIENT
Start: 2021-12-09 | End: 2022-01-10

## 2021-11-16 ASSESSMENT — PAIN SCALES - GENERAL: PAINLEVEL: EXTREME PAIN (8)

## 2021-11-16 NOTE — PROGRESS NOTES
Iris is a 76 year old who is being evaluated via a billable video visit.      How would you like to obtain your AVS? Mail  If the video visit is dropped, the invitation should be resent by: Text to cell phone: [phone]  Will anyone else be joining your video visit? No      Platform used for Video Visit: Doximity     Pain score 8  Constant   What does your pain like feel during a flare? Achy, sharp tingling  Does the pain interfere with:  Work ----- NA  Walking ------ no  Sleep ------- yes  Daily activities ------no  Relationships -------yes  F=5    UDT/CSA- 01/2021    methadone at 0930 this am,  percocet one wk ago PRN

## 2021-11-16 NOTE — LETTER
11/16/2021         RE: Citlalli Noonan  [address]  [facility] [address]        Dear Colleague,    Thank you for referring your patient, Citlalli Noonan, to the [facility]. Please see a copy of my visit note below.    Video Start Time: 338 PM    PAIN CLINIC PROGRESS NOTE    Subjective:   Citlalli Noonan presents for evaluation of low back pain.  She has lumbar degeneration, OA bilateral knees, and diabetic peripheral neuropathy.  She has continued methadone as this helps pain as weaning off medication caused uncontrolled pain and poor sleep and trial of other extended release opioids did not help reduce neuropathic pain.  She was also to continue Endocet, topicals, and muscle relaxants.  She has been educated on lumbar sympathetic block for lower extremity neuropathy.    Major issues:  1. Chronic, continuous use of opioids    2. Chronic pain syndrome    3. Lumbar Disc Degeneration    4. Lumbar radicular pain    5. Diabetic peripheral neuropathy (H)       Pain Rating: see rooming note.  Extreme Pain (8).  See CMA Note  Radiation of pain: left sided leg pain ongoing and chronic to left posterior calf  Gait disturbance: occasionally uses a cane, denies recent falls.   Exacerbating factors: Stress, activity including walking or standing too long, moving, sleeping, weather  Relieving factors: medications, walking, rest.  Associated Symptoms: Numbness in bilateral feet/legs (neuropathy), night pain. Denies fever/chills, weakness, bowel or bladder incontinence.   Functional Symptoms: See rooming notes  Adverse effects of medications: Nausea, vomiting intermittently.  Takes zofran prn. Sweating.  Constipation taking Apple cider vinegar capsule once per day helps keep her regular.    Current treatment efficacy: Good. See medication list.  Current treatment compliance: Good. Keeping follow-up appointments, taking medication as prescribed. Recently completed PT for cervical spine.   "Exercising at home with light weights and walking.      Since last visit, saw Stacia Thrasher NP on 10/21/21 reviewed today:  \"Diagnoses and all orders for this visit:    Type 2 diabetes mellitus with diabetic polyneuropathy, with long-term current use of insulin (HC)  - HEMOGLOBIN A1C MONITORING (POCT); Future  - HEMOGLOBIN A1C MONITORING (POCT)    Hyperlipidemia  - LIPID PANEL; Future  - LIPID PANEL  - atorvastatin (LIPITOR) 40 mg tablet; Take 1 Tablet (40 mg) by mouth once daily.    Post-nasal drainage    Uncontrolled type 2 diabetes mellitus with hyperglycemia (HC)  - metFORMIN (GLUCOPHAGE) 1,000 mg tablet; Take 1 Tablet (1,000 mg) by mouth 2 times daily with meals.    Acquired hypothyroidism  - levothyroxine (SYNTHROID) 25 mcg tablet; Take 1 Tablet (25 mcg) by mouth before breakfast.    Type 2 diabetes mellitus without complication, with long-term current use of insulin (HC)  - losartan (COZAAR) 100 mg tablet; Take 1 Tablet (100 mg) by mouth once daily.    Venous insufficiency  - furosemide (LASIX) 20 mg tablet; Take 1 Tablet (20 mg) by mouth every morning.    Need for prophylactic vaccination and inoculation against influenza  - Fluad FLU VACCINE =>65 YRS PRESERV FREE QUADRIVALENT AIIV4 [067566]    DMV forms completed. Follow up pending lab results.    Patient will be seeing ENT for follow up chronic sinus drainage. She was unable to complete the ct scan of the sinuses and pituitary areas due to choking on phlegm when lying down for the ct scans. cxr negative. She will set up the echocardiogram for follow up on dyspnea on exertion.\"     She tried duloxetine 30 mg and caused her to feel sick with vomiting and shaky feeling. She stopped taking it after 1 week. She would consider trying a lower dose.  She states she has been worried about her cousin Chloe who is now in a memory care facility. She had to empty her apartment and this was distressing to her.      She states her sleep is worse.  She states she hasn't " "been doing much and going to store late at night and gets her mail at 11 pm.  She states she avoids people. She did not speak with PCP about mood.  She declines behavioral health referral.      She states no changes in her pain symptoms.  She has tried gabapentin, amitriptyline, lyrica with side effects.  She denies other neuropathic medication trials including duloxetine, off label amantadine or namenda.  She has not taken PEA or CBD oil.   She stopped topamax 50 mg at night as it was too sedating into the next day, does not want to restart at 25 mg at this time.  She did find out that acupuncture is covered by insurance this year, interested in this but hasn't started due to COVID.       She finds spray lidocaine helpful on her feet to reduce the burning, using at night with feet elevated.  She states she also sprays on her bursa. Feet are swollen for sitting prolonged periods and going through her cousin's paperwork sometimes 10-14 hours per day.      Current Outpatient Medications   Medication     acetaminophen (TYLENOL) 500 MG tablet     aspirin (ASA) 81 MG EC tablet     atorvastatin (LIPITOR) 40 MG tablet     CONTOUR TEST STRIPS Strp     furosemide (LASIX) 20 MG tablet     ibuprofen (ADVIL,MOTRIN) 800 MG tablet     insulin glargine (LANTUS SOLOSTAR) 100 UNIT/ML pen     levothyroxine (SYNTHROID, LEVOTHROID) 25 MCG tablet     lidocaine (LIDODERM) 5 %     losartan (COZAAR) 100 MG tablet     metFORMIN (GLUCOPHAGE) 500 MG tablet     methadone (DOLOPHINE) 5 MG tablet     MICROLET LANCET Misc     omeprazole (PRILOSEC) 20 MG DR capsule     ondansetron (ZOFRAN) 4 MG tablet     pen needle, diabetic 31 gauge x 5/16\" Ndle     polyvinyl alcohol (LIQUIFILM TEARS) 1.4 % ophthalmic solution     No current facility-administered medications for this visit.     Review of Systems  A 12 point ROS was completed and was negative except for above listed and ongoing nausea, intermittent vomiting.  Weight stable, denies fever.  Sleep " interrupted due to stress and pain.      Objective:   There were no vitals taken for this visit.    Physical Exam  Constitutional- General appearance: Appears tired. Well developed, comfortable, obese, and appearance reflects stated age.   No acute distress or pain behaviors noted.  Presents alone today.  Psychiatric- Judgment and insight: Normal.  Speech: Normal rhythm.  Thought process: Normal.  No abnormal thoughts reported. Alert & Oriented to person, place, and time.  Recent and remote memory: Normal.  Observed mood: Concerned, tearful  Respiratory- Breathing is non-labored; normal rhythm and rate.  No shortness of breath during visit.  Dermatologic- Exposed skin is clean, dry, and intact to inspection.  Musculoskeletal- Gait and station: Seated for entire visit.    *Opioid [place] Precautions:    UDT/Blood - 1/21/21 results as expected  Opioid Agreement - 01/21/2021  Pharmacy- as documented    [place]  Reviewed - 11/16/2021 as expected.  Pill Count - 01/15/20 appropriate  Psychological evaluation - n/a  MME - 76 + 16 prn from Endocet   Pharmacogenetic testing - n/a  EKG 10/01/19, NSR and QT/QTc 400/419 ms    Imaging:  EMG 03/17/2021:  Impression    1.  No evidence of mononeuropathy by nerve conduction studies    2.  Subtle mild chronic motor changes in the ulnar innervated   hand muscles is nonspecific.  Could be seen with subtle motor   ulnar neuropathy.  Localization unclear as nerve conduction   studies are normal.    CT Head Brain without Contrast 01/2/2020  IMPRESSION:  1.  Unchanged postoperative appearance when compared to 08/28/2018. No new acute  intracranial abnormality.     2.  Unchanged small amount of enhancing tissue in the right aspect of the sella  which may represent normal residual pituitary tissue. No convincing evidence of  residual/recurrent tumor.    EXAM: XR HIP 2 OR 3 VIEWS W PELVIS LEFT  LOCATION: [facility]  DATE/TIME: 12/23/2019 11:54 AM    INDICATION:  Osteoarthritis Of Spine With Radiculopathy, Lumbar Region  COMPARISON: 08/15/2017    Findings:  The symphysis pubis and pubic rami are normal. The hips demonstrate a mild  degree of symmetric degenerative change characterized by central loss of  articular cartilage, minor marginal spurring and minimal sclerosis. Targeted  attention of the left hip is negative for fracture. There is a 2 cm focus of  circumscribed calcification in the subcutaneous soft tissues of the  anterolateral aspect of the left proximal thigh. These findings are stable  dating back to 2017 and likely represent an area of old osteoporosis. The sacral  wings, sacroiliac joints and iliac wings are negative.    IMPRESSION: Negative for acute bony abnormality. Mild symmetric degenerative  changes in both hips.     CT Spine Lumbar without Contrast 10/19/20  IMPRESSION:    1.  Postsurgical changes right L5 hemilaminectomy.  2.  Sclerotic lesion within the left aspect of the sacrum, relatively stable  since 05/03/2010, which most consistent with a bone island.  3.  No evidence of acute fracture or subluxation of the lumbar spine by CT  imaging.  4.  Lumbar spondylosis with level by level analysis as described above    Assessment:   Citlalli Noonan presents with lower extremity pain secondary to diabetic peripheral neuropathy, cervical pain with CT scan demonstrating multiple levels of moderate stenosis and degeneration, lumbar pain due to degeneration and left leg radiculopathy treated with methadone.  She had updated lumbar CT and we have reviewed lumbar facet injection treatment as needed.  For neuropathic pain, discuss failure of gabapentin, amitriptyline and lyrica, and intolerance to 50 mg topamax (may consider 25 mg trial in the future until she can tolerate higher dose).  Recommend a trial of duloxetine 20 mg for chronic pain and mood benefit; she is educated on this medication and possible risks/side effects.      Phone call to Stacia Thrasher  NP on 455 PM today to notify of discussion today and to see if she would take over opioid management.  Review this provider's last day 12/29/21 and her MME is within CDC guidelines.      Plan:   Continue methadone 15 mg in the am and 10 mg at night.    Check with your pharmacy that all prescriptions are on your profile. Call the pharmacy a week before you want to fill the prescription as stock may vary and the pharmacy may need to order the medication for you. I reserve the right to cancel the electronic prescription at the pharmacy in between appointments and would contact you with an explanation if this were to occur.  Refills sent for 12/09/21   Continue Endocet 10/325 mg 1-2 daily as needed for severe or breakthrough pain.  Refill not needed yet  For left hip bursitis, use Voltaren (diclofenac) 1% gel apply 4 grams at least twice a day, up to 4 times a day. Wash hands after using.  May take ibuprofen 800 mg 1 tab with food up to 3 times a day as needed.  May repeat injection as needed.  May restart duloxetine 20 mg capsule 1 every other day for first 1-2 weeks, then take daily.  Follow-up with PCP Satcia Thrasher NP for med management - you may return to the pain center as needed for increased pain.  My last day is 12/29/21.       Shannan Gagnon PA-C  [facility]  [address]  [place] [address]  Ph: [phone]  Fax: [phone]    Video-Visit Details    Type of service:  Video Visit    Video End Time:4:01 PM    Originating Location (pt. Location): Home    Distant Location (provider location):  [facility]     Platform used for Video Visit: Donita Simmons is a 76 year old who is being evaluated via a billable video visit.      How would you like to obtain your AVS? Mail  If the video visit is dropped, the invitation should be resent by: Text to cell phone: [phone]  Will anyone else be joining your video visit? No      Platform  [facility] Video Visit[facility]     Pain score 8  Constant   What does your pain like feel during a flare? Achy, sharp tingling  Does the pain interfere with:  Work ----- NA  Walking ------ no  Sleep ------- yes  Daily activities ------no  Relationships -------yes  F=5    UDT/CSA- 01/2021    methadone at 0930 this am,  percocet one wk ago PRN        Again, thank you for allowing me to participate in the care of your patient.        Sincerely,        Shannan Gagnon PA-C

## 2021-11-16 NOTE — PATIENT INSTRUCTIONS
Continue methadone 15 mg in the am and 10 mg at night.    Check with your pharmacy that all prescriptions are on your profile. Call the pharmacy a week before you want to fill the prescription as stock may vary and the pharmacy may need to order the medication for you. I reserve the right to cancel the electronic prescription at the pharmacy in between appointments and would contact you with an explanation if this were to occur.  Refills sent for 12/09/21   Continue Endocet 10/325 mg 1-2 daily as needed for severe or breakthrough pain.  Refill not needed yet  For left hip bursitis, use Voltaren (diclofenac) 1% gel apply 4 grams at least twice a day, up to 4 times a day. Wash hands after using.  May take ibuprofen 800 mg 1 tab with food up to 3 times a day as needed.  May repeat injection as needed.  May restart duloxetine 20 mg capsule 1 every other day for first 1-2 weeks, then take daily.  Follow-up with PCP Stacia Thrasher NP for med management - you may return to the pain center as needed for increased pain.  My last day is 12/29/21.

## 2021-12-16 ENCOUNTER — TELEPHONE (OUTPATIENT)
Dept: PALLIATIVE MEDICINE | Facility: OTHER | Age: 76
End: 2021-12-16
Payer: COMMERCIAL

## 2021-12-16 NOTE — TELEPHONE ENCOUNTER
Stacia Thrasher is part of [facility] and she is refusing to take stable patients back - the plan is for her to be referred to an [facility] pain center ([facility]) by her PCP as she is not willing to manage opioids.

## 2021-12-16 NOTE — TELEPHONE ENCOUNTER
Patient left a voice mail. Patient was asking if you spoke with Stacia Thrasher yet? Patient also stated that she wants to stay at this clinic- Pain [facility]. Patients phone number is [phone].  Lisa Augustine LPN

## 2021-12-17 NOTE — TELEPHONE ENCOUNTER
RN spoke to patient and relayed the information that her PCP will be giving her a referral to an [facility] and she stated that is not what she wants.  The patient states she has been with this clinic forever and does not want to move clinic, patient would like Shannan Gagnon PA-C to talk to someone at the pain clinic and set something up for her to stay at the pain clinic.

## 2021-12-17 NOTE — TELEPHONE ENCOUNTER
I'm sorry; it was a difficult decision that was made after I spoke to patient last.  This is per the leadership/medical directors as well so there is no one that I can speak to about this.  The pain center has been closed to outside organization referrals for years and it is necessary due to resignation of providers/access concerns.

## 2021-12-22 NOTE — PATIENT INSTRUCTIONS - HE
Continue methadone 15 mg every 12 hours.  Check with your pharmacy that all prescriptions are on your profile. Call the pharmacy a week before you want to fill the prescription as stock may vary and the pharmacy may need to order the medication for you. I reserve the right to cancel the electronic prescription at the pharmacy in between appointments and would contact you with an explanation if this were to occur.  Refills sent for 11/17/20 & 12/15/20  Continue Endocet 10/325 mg 1-2 daily as needed for severe or breakthrough pain.  Refill not needed today.  Duloxetine trial discussed - not started yet, patient has 30 mg when ready to take 1 daily x 7 days, then increase to 60 mg daily (may take 2 together or take 1 in am and 1 in pm).  Discussed this will take up to 4 weeks to see benefits for pain. Call nurse line with any concerns once started.  Reviewed CT scan - will discuss options for lumbar procedures at future visit  Follow-up in 10-12 weeks with Shannan     Bed in lowest position, wheels locked, appropriate side rails in place/Call bell, personal items and telephone in reach/Instruct patient to call for assistance before getting out of bed or chair/Non-slip footwear when patient is out of bed/[place] to call system/Physically safe environment - no spills, clutter or unnecessary equipment/Purposeful Proactive Rounding/Room/bathroom lighting operational, light cord in reach

## 2021-12-23 NOTE — TELEPHONE ENCOUNTER
Patient is calling would like a phone call from Shannan before she leaves.  Pt is wanting to talk to upper management too.

## 2021-12-28 NOTE — TELEPHONE ENCOUNTER
Was able to speak with patient about transition of care. Discussed the staffing changes here and the recommendations to return stable patients to PCP.  She states Stacia Thrasher is reducing her hours and won't take on her opioid management.  We discuss referral to another pain center - she was at [facility] years ago and would rather have another location such as [facility].  She is going to make an appointment with Stacia to discuss a referral.  She understands she may call for refills during this process and that we will provide refills as she is making progress on the new pain center location.   She has been seeing Stacia for years and doesn't plan to change to a [facility] PCP at this time, but does state if the pain center will take external referrals in the future that she would like to return.  She is happy that we would connect and her questions were answered, is relieved.  Denies any further questions/concerns.

## 2021-12-28 NOTE — TELEPHONE ENCOUNTER
Return call to patient regarding concerns.  Received voicemail but mailbox was full, could not leave a message. She does not have [employer].  Called home number listed and also went to a voicemail; was able to leave a message to call me back today before 130 PM or Wednesday afternoon.

## 2022-01-10 DIAGNOSIS — G89.4 CHRONIC PAIN SYNDROME: ICD-10-CM

## 2022-01-10 RX ORDER — METHADONE HYDROCHLORIDE 5 MG/1
TABLET ORAL
Qty: 140 TABLET | Refills: 0 | Status: SHIPPED | OUTPATIENT
Start: 2022-01-10 | End: 2022-02-03

## 2022-01-10 NOTE — TELEPHONE ENCOUNTER
Received call from patient requesting refill(s) of Methadone    Last dispensed from pharmacy on 2/18/21 (not reliable, check )    Patient's last office/virtual visit by prescribing provider on 11/16/21  Next office/virtual appointment scheduled for back to PCP for new referral    Last urine drug screen date 1/2021  Current opioid agreement on file (completed within the last year) Yes Date of opioid agreement: 1/2021    E-prescribe to [facility] pharmacy  Pending Prescriptions:                       Disp   Refills    methadone (DOLOPHINE) 5 MG tablet         140 ta*0            Sig: Take 3 tablets (15 mg) by mouth every morning AND           2 tablets (10 mg) every evening. Do all this for           28 days.

## 2022-02-02 ENCOUNTER — TELEPHONE (OUTPATIENT)
Dept: PALLIATIVE MEDICINE | Facility: OTHER | Age: 77
End: 2022-02-02
Payer: COMMERCIAL

## 2022-02-02 DIAGNOSIS — G89.4 CHRONIC PAIN SYNDROME: ICD-10-CM

## 2022-02-02 NOTE — TELEPHONE ENCOUNTER
Refill request: methadone  Patient is transitioning her care to [facility] however will need another refill as she is not yet established.

## 2022-02-02 NOTE — TELEPHONE ENCOUNTER
Received call from patient requesting refill(s) of methadone (DOLOPHINE) 5 MG tablet     Last dispensed from pharmacy on 02/18/2021    Patient's last office/virtual visit by prescribing provider on 11/16/2021  Next office/virtual appointment scheduled for NA    Last urine drug screen date 01/2021  Current opioid agreement on file (completed within the last year No Date of opioid agreement: 09/16/2020    E-prescribe to [id number] [[address]  pharmacy    Will route to nursing [place] for review and preparation of prescription(s).     Patient is transitioning her care to [facility] however will need another refill as she is not yet established.    Rosalba Lee MA  [facility]

## 2022-02-03 RX ORDER — METHADONE HYDROCHLORIDE 5 MG/1
TABLET ORAL
Qty: 140 TABLET | Refills: 0 | Status: SHIPPED | OUTPATIENT
Start: 2022-02-07 | End: 2022-03-07

## 2022-02-03 NOTE — TELEPHONE ENCOUNTER
Medication refill information reviewed.     Prescriptions prepped for review.   Unable to review the  at this time, please double check start date.  Pending Prescriptions:                       Disp   Refills    methadone (DOLOPHINE) 5 MG tablet         140 ta*0            Sig: Take 3 tablets (15 mg) by mouth every morning AND           2 tablets (10 mg) every evening. Do all this for           28 days.    Will route to provider.

## 2022-10-26 NOTE — LETTER
8/19/2021         RE: Citlalli Noonan  [address]  [facility] [address]        Dear Colleague,    Thank you for referring your patient, Citlalli Noonan, to the [facility]. Please see a copy of my visit note below.    Video Start Time: 859 AM    PAIN CLINIC PROGRESS NOTE    Subjective:   Citlalli Noonan presents for evaluation of low back pain.  She has lumbar degeneration, OA bilateral knees, and diabetic peripheral neuropathy.  She has continued low dose methadone as this helps pain but does have intermittent nausea/vomiting as a side effect.  Weaning off medication caused uncontrolled pain and poor sleep and trial of other extended release opioids did no help reduce neuropathic pain.  She was also to continue Endocet, topicals, and muscle relaxants.  She has been educated on lumbar sympathetic block for lower extremity neuropathy.    Major issues:  1. Chronic, continuous use of opioids    2. Chronic pain syndrome    3. Lumbar Disc Degeneration    4. Lumbar radicular pain    5. Diabetic peripheral neuropathy (H)       Pain Rating: see CMA note  Radiation of pain: left sided leg pain ongoing and chronic to left posterior calf  Gait disturbance: occasionally uses a cane, denies recent falls.   Exacerbating factors: Stress, activity including walking or standing too long, moving, sleeping, weather  Relieving factors: medications, walking, rest.  Associated Symptoms: Numbness in bilateral feet/legs (neuropathy), night pain. Denies fever/chills, weakness, bowel or bladder incontinence.   Functional Symptoms: See CMA notes  Adverse effects of medications: Nausea, vomiting intermittently 2-3 times per week. Takes zofran prn. Sweating.  Constipation taking Apple cider vinegar capsule once per day helps keep her regular.    Current treatment efficacy: Good. See medication list.  Current treatment compliance: Good. Keeping follow-up appointments, taking medication as prescribed. Recently  "completed PT for cervical spine.  Exercising at home with light weights and walking.      Since last visit, she had appointment with Stacia Thrasher CNP 07/20/2021:  \"Diagnoses and all orders for this visit:    Type 2 diabetes mellitus with diabetic polyneuropathy, with long-term current use of insulin (HC)  - BASIC METABOLIC PANEL; Future  - MAGNESIUM; Future  - HEMOGLOBIN A1C SCREENING; Future  - EXTRA TUBE GOLD/SST; Future  - BASIC METABOLIC PANEL  - MAGNESIUM  - Cancel: HEMOGLOBIN A1C SCREENING  - EXTRA TUBE GOLD/SST  - HEMOGLOBIN A1C MONITORING (POCT); Future  - HEMOGLOBIN A1C MONITORING (POCT)    Essential hypertension  - BASIC METABOLIC PANEL; Future  - MAGNESIUM; Future  - BASIC METABOLIC PANEL  - MAGNESIUM    Stage 3a chronic kidney disease (HC)  - BASIC METABOLIC PANEL; Future  - MAGNESIUM; Future  - BASIC METABOLIC PANEL  - MAGNESIUM    Hyperlipidemia    Acquired hypothyroidism  - TSH; Future  - TSH    Anxiety  - DULoxetine (CYMBALTA) 30 mg Delayed-release capsule; Take 1 Capsule (30 mg) by mouth once daily.    Acute recurrent maxillary sinusitis  - SPUTUM CULTURE, STAIN; Future    Follow up with me again in 4 weeks.\"     She was worried about sweating as a side effect on duloxetine so she didn't start it.  She will see her again in 1 month in person.  She states she may consider starting it later in the year as summer is hard for her with heat.  We review today the benefit this medication may give for pain as well.    She states her pain rating today is 9/10.  She states left hip is so swollen and sore from bursitis.  She states it is difficult to walk with this pain.  She states once she starts walking it is \"fine.\"  She doesn't want to do injection right now.  She states she is using topical spray which only is short term relief.  She states she doesn't like using gel as it is messy.      She states her lower back was sprained as she twisted and lifted a heavy box from [place] into her car a couple " "weeks ago, lasted 2 weeks but has resolved now. Was using her Ibuprofen and Endocet as needed was about 4 days ago that she took a dose.  She states it helps 50% at least.  She states she gets relief from constant pain.  She states constipation side effect but doesn't have to take stool softener daily.    She states no changes in her left hand/arm - she states it is numb/tingling and weaker than the right hand as her sensation is different. She states she doesn't want to have surgery with Dr. Mccollum.   She is right handed.  She states her spine pain has been \"pretty good\" and everything feels pretty good except her left hip.  She states it her bursa feels on fire and Ibuprofen helps with that and wants it refilled.  She is not using any topicals as she doesn't have the lidocaine patch.   She has tried salon pas.  She states she had bursa injections which was last done 01/16/2019 which she states lasted a couple weeks at the most.      She has tried gabapentin, amitriptyline, lyrica with side effects.  She denies other neuropathic medication trials including duloxetine, off label amantadine or namenda.  She has not taken PEA or CBD oil.   She stopped topamax 50 mg at night as it was too sedating into the next day, does not want to restart at 25 mg at this time.  She did find out that acupuncture is covered by insurance this year, interested in this but hasn't started due to COVID.       Review of Systems  A 12 point ROS was completed and was negative except for above listed and ongoing nausea, intermittent vomiting.  Weight stable, denies fever.  Sleep interrupted due to stress and pain.        Objective:   There were no vitals taken for this visit.    Physical Exam  Constitutional- General appearance: Diaphoretic, fanning herself with handicap parking sticker.  Well developed, comfortable, overweight, and appearance reflects stated age.   No acute distress or pain behaviors noted.  Presents alone today.  Psychiatric- " Judgment and insight: Normal.  Speech: Normal rhythm.  Thought process: Normal.  No abnormal thoughts reported. Alert & Oriented to person, place, and time.  Recent and remote memory: Normal.  Observed mood: Appropriate  Respiratory- Breathing is non-labored; normal rhythm and rate.  No shortness of breath during visit.  Dermatologic- Exposed skin is clean, dry, and intact to inspection.  Musculoskeletal- Gait and station: Ambulates independently with antalgia. Able to sit to stand with some difficulty.    *Opioid [place] Precautions:    UDT/Blood - 1/21/21 results as expected  Opioid Agreement - 01/21/2021  Pharmacy- as documented    [place]  Reviewed - 08/19/2021 as expected.  Pill Count - 01/15/20 appropriate  Psychological evaluation - n/a  MME - 76 + 15 prn from Endocet   Pharmacogenetic testing - n/a  EKG 10/01/19, NSR and QT/QTc 400/419 ms    Imaging:  EMG 03/17/2021:  Impression    1.  No evidence of mononeuropathy by nerve conduction studies    2.  Subtle mild chronic motor changes in the ulnar innervated   hand muscles is nonspecific.  Could be seen with subtle motor   ulnar neuropathy.  Localization unclear as nerve conduction   studies are normal.    CT Head Brain without Contrast 01/2/2020  IMPRESSION:  1.  Unchanged postoperative appearance when compared to 08/28/2018. No new acute  intracranial abnormality.     2.  Unchanged small amount of enhancing tissue in the right aspect of the sella  which may represent normal residual pituitary tissue. No convincing evidence of  residual/recurrent tumor.    EXAM: XR HIP 2 OR 3 VIEWS W PELVIS LEFT  LOCATION: [facility]  DATE/TIME: 12/23/2019 11:54 AM    INDICATION: Osteoarthritis Of Spine With Radiculopathy, Lumbar Region  COMPARISON: 08/15/2017    Findings:  The symphysis pubis and pubic rami are normal. The hips demonstrate a mild  degree of symmetric degenerative change characterized by central loss of  articular cartilage, minor marginal  spurring and minimal sclerosis. Targeted  attention of the left hip is negative for fracture. There is a 2 cm focus of  circumscribed calcification in the subcutaneous soft tissues of the  anterolateral aspect of the left proximal thigh. These findings are stable  dating back to 2017 and likely represent an area of old osteoporosis. The sacral  wings, sacroiliac joints and iliac wings are negative.    IMPRESSION: Negative for acute bony abnormality. Mild symmetric degenerative  changes in both hips.     CT Spine Lumbar without Contrast 10/19/20  IMPRESSION:    1.  Postsurgical changes right L5 hemilaminectomy.  2.  Sclerotic lesion within the left aspect of the sacrum, relatively stable  since 05/03/2010, which most consistent with a bone island.  3.  No evidence of acute fracture or subluxation of the lumbar spine by CT  imaging.  4.  Lumbar spondylosis with level by level analysis as described above    Assessment:   Citlalli Noonan presents with lower extremity pain secondary to diabetic peripheral neuropathy, cervical pain with CT scan demonstrating multiple levels of moderate stenosis and degeneration, lumbar pain due to degeneration and left leg radiculopathy treated with methadone.  She had updated lumbar CT and we have reviewed lumbar facet injection treatment as needed.  For neuropathic pain, discuss failure of gabapentin, amitriptyline and lyrica, and intolerance to 50 mg topamax (may consider 25 mg trial in the future until she can tolerate higher dose).  Recommend a trial of duloxetine for chronic pain and mood benefit; she is educated on this medication and possible risks/side effects.      Plan:   Continue methadone 15 mg in the am and 10 mg at night.    Check with your pharmacy that all prescriptions are on your profile. Call the pharmacy a week before you want to fill the prescription as stock may vary and the pharmacy may need to order the medication for you. I reserve the right to cancel the  electronic prescription at the pharmacy in between appointments and would contact you with an explanation if this were to occur.  Refills sent for  09/09/2021 & 10/07/2021  Continue Endocet 10/325 mg 1-2 daily as needed for severe or breakthrough pain.  No refill needed today  For left hip bursitis, use Voltaren (diclofenac) 1% gel apply 4 grams at least twice a day, up to 4 times a day.  Wash hands after using.  May take ibuprofen 800 mg 1 tab with food up to 3 times a day as needed.  May repeat injection as needed.  Follow-up in 8 weeks with Shannan Gagnon PA-C  [facility]  [address]  [place] [address]  Ph: [phone]  Fax: [phone]    Video-Visit Details    Type of service:  Video Visit    Video End Time:9:19 AM    Originating Location (pt. Location): Home    Distant Location (provider location):  [facility]     Platform used for Video Visit: [employer]      25 minutes spent on the date of the encounter doing chart review, history and exam, documentation, and further activities.          Iris is a 75 year old who is being evaluated via a billable video visit.      How would you like to obtain your AVS? mail  If the video visit is dropped, the invitation should be resent by: [phone] DOXIMITY  Will anyone else be joining your video visit? no    Pain score: 9  Constant   What does your pain feel like: ache, sharp at bedtime  Does the pain interfere with:  Work: no  Walking: yes  Sleep: yes  Daily activities: yes  Relationships: yes  F= 7         Again, thank you for allowing me to participate in the care of your patient.        Sincerely,        Shannan Gagnon PA-C     Normal for race

## 2023-01-19 ENCOUNTER — TRANSFERRED RECORDS (OUTPATIENT)
Dept: HEALTH INFORMATION MANAGEMENT | Facility: CLINIC | Age: 78
End: 2023-01-19
Payer: COMMERCIAL

## 2023-01-23 ENCOUNTER — TRANSCRIBE ORDERS (OUTPATIENT)
Dept: OTHER | Age: 78
End: 2023-01-23

## 2023-01-23 DIAGNOSIS — J32.4 CHRONIC PANSINUSITIS: Primary | ICD-10-CM

## 2023-04-07 ENCOUNTER — OFFICE VISIT (OUTPATIENT)
Dept: OTOLARYNGOLOGY | Facility: CLINIC | Age: 78
End: 2023-04-07
Payer: COMMERCIAL

## 2023-04-07 DIAGNOSIS — J34.89 NASAL CRUSTING: ICD-10-CM

## 2023-04-07 DIAGNOSIS — R13.10 DYSPHAGIA, UNSPECIFIED TYPE: Primary | ICD-10-CM

## 2023-04-07 DIAGNOSIS — J34.89 NASAL SEPTUM PERFORATION: ICD-10-CM

## 2023-04-07 DIAGNOSIS — R09.A2 GLOBUS SENSATION: ICD-10-CM

## 2023-04-07 DIAGNOSIS — J32.4 CHRONIC PANSINUSITIS: ICD-10-CM

## 2023-04-07 PROCEDURE — 99204 OFFICE O/P NEW MOD 45 MIN: CPT | Mod: 25 | Performed by: OTOLARYNGOLOGY

## 2023-04-07 PROCEDURE — 87077 CULTURE AEROBIC IDENTIFY: CPT | Performed by: OTOLARYNGOLOGY

## 2023-04-07 PROCEDURE — 87070 CULTURE OTHR SPECIMN AEROBIC: CPT | Performed by: OTOLARYNGOLOGY

## 2023-04-07 PROCEDURE — 31575 DIAGNOSTIC LARYNGOSCOPY: CPT | Performed by: OTOLARYNGOLOGY

## 2023-04-07 RX ORDER — PANTOPRAZOLE SODIUM 40 MG/1
40 TABLET, DELAYED RELEASE ORAL DAILY
Qty: 90 TABLET | Refills: 0 | Status: SHIPPED | OUTPATIENT
Start: 2023-04-07 | End: 2023-07-06

## 2023-04-07 RX ORDER — MUPIROCIN 20 MG/G
OINTMENT TOPICAL
Qty: 22 G | Refills: 0 | Status: SHIPPED | OUTPATIENT
Start: 2023-04-07

## 2023-04-07 NOTE — NURSING NOTE
Sino-Nasal Outcome Test (SNOT - 22)    1. Need to Blow Nose: (P) Moderate  2. Nasal Blockage: (P) Severe  3. Sneezing: (P) Moderate  4. Runny Nose: (P) Bad as it can be  5. Cough: (P) Moderate  6. Post-nasal discharge: (P) Bad as it can be  7. Thick nasal discharge: (P) Bad as it can be  8. Ear fullness: (P) Very mild  9. Dizziness: (P) Mild or slight  10. Ear Pain: (P) Very mild  11. Facial pain/pressure: (P) Mild or slight  12. Decreased Sense of Smell/Taste: (P) Mild or slight  13. Difficulty falling asleep: (P) Moderate  14. Wake up at night: (P) Moderate  15. Lack of a good night's sleep: (P) Bad as it can be  16. Wake up tired: (P) Bad as it can be  17. Fatigue: (P) Moderate  18. Reduced Productivity: (P) Moderate  19. Reduced Concentration: (P) Severe  20. Frustrated/restless/irritable: (P) Severe  21. Sad: (P) None  22. Embarrassed: (P) None    Total Score: (P) 66    COPYRIGHT 1996. [facility] [place]    Cathryn Márquez MA on 4/7/2023 at 11:40 AM

## 2023-04-07 NOTE — LETTER
"    4/7/2023         RE: Citlalli Noonan  [address]  [facility] [place] [address]        Dear Colleague,    Thank you for referring your patient, Citlalli Noonan, to the [facility]. Please see a copy of my visit note below.    CHIEF COMPLAINT: Patient presents with:  Consult: Chronic post nasal drip, runny nose,under eye pressure, sinus infections          HISTORY OF PRESENT ILLNESS    Iris was seen at the behest of Stacia Thrasher NP for sinus concerns.   She has constant phlegm in the back of her throat the causes her to choke.  She has three kinds (yellow, white, and green).  The drainage is constant.  She suffers from sinus infections every 2-3 months.  She had sinus surgery in the 1990s (Dr. Valverde).   Some difficulty swallowing.  She had COVID last July and now is suffering from chronic cough.  She doesn't like to take medicine regulalry. She has tried OTC \"sin aid\" and nasal saline spray.   She is congested \"all the time\". No pets at home.  Former allergy testing was negative per patient.  No Coffee.  Milk with cereal in AM causes increased phlegm.     RSI = 30      Previous MRI: (5 years ago)    IMPRESSION:  CONCLUSION:  1.  Pretreatment planning study.  2.  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 It is not significantly changed in size compared to previous brain MRI   11/19/2016 given the differences in modalities.  3.  No intracranial hemorrhage, mass lesions or CT evidence for an acute infarct. Mild age-related changes.       REVIEW OF SYSTEMS    Review of Systems as per HPI and PMHx, otherwise 10 system review system are negative.       ALLERGIES    Prednisolone, Atorvastatin, Dulaglutide, Hydrocodone-acetaminophen, Lyrica [pregabalin], and Simvastatin    CURRENT MEDICATIONS      Current Outpatient Medications:      aspirin (ASA) 81 MG EC " tablet, Take 1 tablet by mouth, Disp: , Rfl:      atorvastatin (LIPITOR) 40 MG tablet, [ATORVASTATIN (LIPITOR) 40 MG TABLET] Take 40 mg by mouth every other day. , Disp: , Rfl: 3     CONTOUR TEST STRIPS Strp, [CONTOUR TEST STRIPS STRP] , Disp: , Rfl:      furosemide (LASIX) 20 MG tablet, [FUROSEMIDE (LASIX) 20 MG TABLET] Take 20 mg by mouth daily. , Disp: , Rfl:      insulin glargine (LANTUS SOLOSTAR) 100 UNIT/ML pen, Inject 30 Units Subcutaneous, Disp: , Rfl:      levothyroxine (SYNTHROID, LEVOTHROID) 25 MCG tablet, [LEVOTHYROXINE (SYNTHROID, LEVOTHROID) 25 MCG TABLET] Take 25 mcg by mouth Daily at 6:00 am. , Disp: , Rfl:      losartan (COZAAR) 100 MG tablet, [LOSARTAN (COZAAR) 100 MG TABLET] Take 100 mg by mouth., Disp: , Rfl:      metFORMIN (GLUCOPHAGE) 500 MG tablet, [METFORMIN (GLUCOPHAGE) 500 MG TABLET] Take 500 mg by mouth 2 (two) times a day with meals., Disp: , Rfl:      mupirocin (BACTROBAN) 2 % external ointment, Apply a pea sized amount to inside of each nostril 3x daily with Qtip for 14 days, Disp: 22 g, Rfl: 0     ondansetron (ZOFRAN) 4 MG tablet, [ONDANSETRON (ZOFRAN) 4 MG TABLET] Take 4 mg by mouth every 8 (eight) hours as needed for nausea., Disp: , Rfl:      pantoprazole (PROTONIX) 40 MG EC tablet, Take 1 tablet (40 mg) by mouth daily for 90 days, Disp: 90 tablet, Rfl: 0     acetaminophen (TYLENOL) 500 MG tablet, Take 1,000 mg by mouth, Disp: , Rfl:      DULoxetine (CYMBALTA) 20 MG capsule, Take 1 capsule (20 mg) by mouth every other day for 14 days, THEN 1 capsule (20 mg) daily for 14 days., Disp: 21 capsule, Rfl: 0     ibuprofen (ADVIL,MOTRIN) 800 MG tablet, [IBUPROFEN (ADVIL,MOTRIN) 800 MG TABLET] Take 1 tablet (800 mg total) by mouth every 8 (eight) hours as needed for pain., Disp: 90 tablet, Rfl: 3     lidocaine (LIDODERM) 5 %, [LIDOCAINE (LIDODERM) 5 %] Place 1 patch on the skin daily. Remove & Discard patch within 12 hours or as directed by MD, Disp: , Rfl:      methadone (DOLOPHINE) 5 MG  "tablet, Take 3 tablets (15 mg) by mouth every morning AND 2 tablets (10 mg) every evening. Do all this for 28 days., Disp: 140 tablet, Rfl: 0     MICROLET LANCET Misc, [MICROLET LANCET MISC] , Disp: , Rfl:      omeprazole (PRILOSEC) 20 MG DR capsule, Take 20 mg by mouth 2 times daily, Disp: , Rfl:      pen needle, diabetic 31 gauge x 5/16\" Ndle, [PEN NEEDLE, DIABETIC 31 GAUGE X 5/16\" NDLE] For administering insulin at home., Disp: , Rfl:      polyvinyl alcohol (LIQUIFILM TEARS) 1.4 % ophthalmic solution, [POLYVINYL ALCOHOL (LIQUIFILM TEARS) 1.4 % OPHTHALMIC SOLUTION] Administer 1 drop to both eyes as needed for dry eyes., Disp: , Rfl:      PAST MEDICAL HISTORY    PAST MEDICAL HISTORY:   Past Medical History:   Diagnosis Date     Anxiety      Borderline glaucoma with ocular hypertension      Chronic leg pain      Chronic neck pain      Colon polyps      Diabetic polyneuropathy (H)      Fatty liver      HLD (hyperlipidemia)      HTN (hypertension)      Hyperlipidemia      Laryngopharyngeal reflux      Obstructive sleep apnea      Osteoarthritis of spine with radiculopathy, lumbar region      Osteopenia      Pituitary macroadenoma (H)      Restless legs syndrome      Type II diabetes mellitus (H)        PAST SURGICAL HISTORY    PAST SURGICAL HISTORY:   Past Surgical History:   Procedure Laterality Date     CATARACT EXTRACTION       CRANIOTOMY N/A 4/14/2017    Procedure: TRANSPHENOIDAL REMOVAL OF PITUITARY TUMOR PLUS HARVEST FASCIA AND MUSCLE LATERAL THIGH;  Surgeon: Joselito Gresham MD;  Location: [facility];  Service:      EYE SURGERY       HC REVISE MEDIAN N/CARPAL TUNNEL SURG      Description: Neuroplasty Decompression Median Nerve At Carpal Tunnel;  Recorded: 12/15/2011;     HYSTERECTOMY       NASAL SINUS SURGERY Left      UT LAMINEC/FACETECT/FORAMIN,LUMBAR 1 SEG      Description: Laminectomy Decompress, Facetectomy, Foraminotomy Lumbar Seg;  Recorded: 12/15/2011;     [facility] TOTAL ABDOM HYSTERECTOMY      " Description: Hysterectomy;  Recorded: 12/15/2011;       FAMILY  HISTORY    FAMILY HISTORY:   Family History   Problem Relation Age of Onset     Breast Cancer Mother      Uterine Cancer Mother      Leukemia Mother      Hypertension Mother      Coronary Artery Disease Father        SOCIAL HISTORY    SOCIAL HISTORY:   Social History     Tobacco Use     Smoking status: Never     Smokeless tobacco: Never   Vaping Use     Vaping status: Not on file   Substance Use Topics     Alcohol use: No        PHYSICAL EXAM    HEAD: Normal appearance and symmetry:  No cutaneous lesions.      NECK:  supple     EARS:    Right:   External ears normal. Canals clear. TM's normal.   LEFT:  External ears normal. Canals clear. TM's normal.    EYES:  EOMI    CN VII/XII:  intact     NOSE:     Dorsum:   straight  Septum:  midline  Mucosa:  moist        ORAL CAVITY/OROPHARYNX:     Lips:  Normal.  Tongue: normal, midline  Mucosa:   no lesions     NECK:  Trachea:  midline.              Thyroid:  normal              Adenopathy:  none        NEURO:   Alert and Oriented     GAIT AND STATION:  normal     RESPIRATORY:   Symmetry and Respiratory effort     PSYCH:  Normal mood and affect     SKIN:   warm and dry         FLEX LARYNGOSCOPY:    After obtaining consent, a flexible laryngoscope is used to examine both nasal cavities, the nasopharynx, pharnx, and larynx.      Nasal cavity: septal peforation with thick white disharge (culture obtained)  NP: wnl  Pharnx: wnl  Larynx: wnl    IMPRESSION:    Encounter Diagnoses   Name Primary?     Chronic pansinusitis      Dysphagia, unspecified type Yes     Globus sensation      Nasal crusting      Nasal septum perforation           RECOMMENDATIONS:      .  Orders Placed This Encounter   Procedures     LARYNGOSCOPY FLEX FIBEROPTIC, DIAGNOSTIC     CT Sinus w/o Contrast     XR Esophagram     Adult General Surg Referral     Orders Placed This Encounter   Medications     mupirocin (BACTROBAN) 2 % external ointment      Sig: Apply a pea sized amount to inside of each nostril 3x daily with Qtip for 14 days     Dispense:  22 g     Refill:  0     pantoprazole (PROTONIX) 40 MG EC tablet     Sig: Take 1 tablet (40 mg) by mouth daily for 90 days     Dispense:  90 tablet     Refill:  0       Again, thank you for allowing me to participate in the care of your patient.        Sincerely,        Maco Adkins MD

## 2023-04-07 NOTE — PROGRESS NOTES
"CHIEF COMPLAINT: Patient presents with:  Consult: Chronic post nasal drip, runny nose,under eye pressure, sinus infections          HISTORY OF PRESENT ILLNESS    Iris was seen at the behest of Stacia Thrasher NP for sinus concerns.   She has constant phlegm in the back of her throat the causes her to choke.  She has three kinds (yellow, white, and green).  The drainage is constant.  She suffers from sinus infections every 2-3 months.  She had sinus surgery in the 1990s (Dr. Valverde).   Some difficulty swallowing.  She had COVID last July and now is suffering from chronic cough.  She doesn't like to take medicine regulalry. She has tried OTC \"sin aid\" and nasal saline spray.   She is congested \"all the time\". No pets at home.  Former allergy testing was negative per patient.  No Coffee.  Milk with cereal in AM causes increased phlegm.     RSI = 30      Previous MRI: (5 years ago)    IMPRESSION:  CONCLUSION:  1.  Pretreatment planning study.  2.  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 It is not significantly changed in size compared to previous brain MRI   11/19/2016 given the differences in modalities.  3.  No intracranial hemorrhage, mass lesions or CT evidence for an acute infarct. Mild age-related changes.       REVIEW OF SYSTEMS    Review of Systems as per HPI and PMHx, otherwise 10 system review system are negative.       ALLERGIES    Prednisolone, Atorvastatin, Dulaglutide, Hydrocodone-acetaminophen, Lyrica [pregabalin], and Simvastatin    CURRENT MEDICATIONS      Current Outpatient Medications:      aspirin (ASA) 81 MG EC tablet, Take 1 tablet by mouth, Disp: , Rfl:      atorvastatin (LIPITOR) 40 MG tablet, [ATORVASTATIN (LIPITOR) 40 MG TABLET] Take 40 mg by mouth every other day. , Disp: , Rfl: 3     CONTOUR TEST STRIPS Strp, [CONTOUR TEST STRIPS STRP] , Disp: , Rfl:      " furosemide (LASIX) 20 MG tablet, [FUROSEMIDE (LASIX) 20 MG TABLET] Take 20 mg by mouth daily. , Disp: , Rfl:      insulin glargine (LANTUS SOLOSTAR) 100 UNIT/ML pen, Inject 30 Units Subcutaneous, Disp: , Rfl:      levothyroxine (SYNTHROID, LEVOTHROID) 25 MCG tablet, [LEVOTHYROXINE (SYNTHROID, LEVOTHROID) 25 MCG TABLET] Take 25 mcg by mouth Daily at 6:00 am. , Disp: , Rfl:      losartan (COZAAR) 100 MG tablet, [LOSARTAN (COZAAR) 100 MG TABLET] Take 100 mg by mouth., Disp: , Rfl:      metFORMIN (GLUCOPHAGE) 500 MG tablet, [METFORMIN (GLUCOPHAGE) 500 MG TABLET] Take 500 mg by mouth 2 (two) times a day with meals., Disp: , Rfl:      mupirocin (BACTROBAN) 2 % external ointment, Apply a pea sized amount to inside of each nostril 3x daily with Qtip for 14 days, Disp: 22 g, Rfl: 0     ondansetron (ZOFRAN) 4 MG tablet, [ONDANSETRON (ZOFRAN) 4 MG TABLET] Take 4 mg by mouth every 8 (eight) hours as needed for nausea., Disp: , Rfl:      pantoprazole (PROTONIX) 40 MG EC tablet, Take 1 tablet (40 mg) by mouth daily for 90 days, Disp: 90 tablet, Rfl: 0     acetaminophen (TYLENOL) 500 MG tablet, Take 1,000 mg by mouth, Disp: , Rfl:      DULoxetine (CYMBALTA) 20 MG capsule, Take 1 capsule (20 mg) by mouth every other day for 14 days, THEN 1 capsule (20 mg) daily for 14 days., Disp: 21 capsule, Rfl: 0     ibuprofen (ADVIL,MOTRIN) 800 MG tablet, [IBUPROFEN (ADVIL,MOTRIN) 800 MG TABLET] Take 1 tablet (800 mg total) by mouth every 8 (eight) hours as needed for pain., Disp: 90 tablet, Rfl: 3     lidocaine (LIDODERM) 5 %, [LIDOCAINE (LIDODERM) 5 %] Place 1 patch on the skin daily. Remove & Discard patch within 12 hours or as directed by MD, Disp: , Rfl:      methadone (DOLOPHINE) 5 MG tablet, Take 3 tablets (15 mg) by mouth every morning AND 2 tablets (10 mg) every evening. Do all this for 28 days., Disp: 140 tablet, Rfl: 0     MICROLET LANCET Misc, [MICROLET LANCET MISC] , Disp: , Rfl:      omeprazole (PRILOSEC) 20 MG DR capsule, Take 20  "mg by mouth 2 times daily, Disp: , Rfl:      pen needle, diabetic 31 gauge x 5/16\" Ndle, [PEN NEEDLE, DIABETIC 31 GAUGE X 5/16\" NDLE] For administering insulin at home., Disp: , Rfl:      polyvinyl alcohol (LIQUIFILM TEARS) 1.4 % ophthalmic solution, [POLYVINYL ALCOHOL (LIQUIFILM TEARS) 1.4 % OPHTHALMIC SOLUTION] Administer 1 drop to both eyes as needed for dry eyes., Disp: , Rfl:      PAST MEDICAL HISTORY    PAST MEDICAL HISTORY:   Past Medical History:   Diagnosis Date     Anxiety      Borderline glaucoma with ocular hypertension      Chronic leg pain      Chronic neck pain      Colon polyps      Diabetic polyneuropathy (H)      Fatty liver      HLD (hyperlipidemia)      HTN (hypertension)      Hyperlipidemia      Laryngopharyngeal reflux      Obstructive sleep apnea      Osteoarthritis of spine with radiculopathy, lumbar region      Osteopenia      Pituitary macroadenoma (H)      Restless legs syndrome      Type II diabetes mellitus (H)        PAST SURGICAL HISTORY    PAST SURGICAL HISTORY:   Past Surgical History:   Procedure Laterality Date     CATARACT EXTRACTION       CRANIOTOMY N/A 4/14/2017    Procedure: TRANSPHENOIDAL REMOVAL OF PITUITARY TUMOR PLUS HARVEST FASCIA AND MUSCLE LATERAL THIGH;  Surgeon: Joselito Gresham MD;  Location: [facility];  Service:      EYE SURGERY       HC REVISE MEDIAN N/CARPAL TUNNEL SURG      Description: Neuroplasty Decompression Median Nerve At Carpal Tunnel;  Recorded: 12/15/2011;     HYSTERECTOMY       NASAL SINUS SURGERY Left      FL LAMINEC/FACETECT/FORAMIN,LUMBAR 1 SEG      Description: Laminectomy Decompress, Facetectomy, Foraminotomy Lumbar Seg;  Recorded: 12/15/2011;     [email or web address] TOTAL ABDOM HYSTERECTOMY      Description: Hysterectomy;  Recorded: 12/15/2011;       FAMILY  HISTORY    FAMILY HISTORY:   Family History   Problem Relation Age of Onset     Breast Cancer Mother      Uterine Cancer Mother      Leukemia Mother      Hypertension Mother      Coronary Artery " Disease Father        SOCIAL HISTORY    SOCIAL HISTORY:   Social History     Tobacco Use     Smoking status: Never     Smokeless tobacco: Never   Vaping Use     Vaping status: Not on file   Substance Use Topics     Alcohol use: No        PHYSICAL EXAM    HEAD: Normal appearance and symmetry:  No cutaneous lesions.      NECK:  supple     EARS:    Right:   External ears normal. Canals clear. TM's normal.   LEFT:  External ears normal. Canals clear. TM's normal.    EYES:  EOMI    CN VII/XII:  intact     NOSE:     Dorsum:   straight  Septum:  midline  Mucosa:  moist        ORAL CAVITY/OROPHARYNX:     Lips:  Normal.  Tongue: normal, midline  Mucosa:   no lesions     NECK:  Trachea:  midline.              Thyroid:  normal              Adenopathy:  none        NEURO:   Alert and Oriented     GAIT AND STATION:  normal     RESPIRATORY:   Symmetry and Respiratory effort     PSYCH:  Normal mood and affect     SKIN:   warm and dry         FLEX LARYNGOSCOPY:    After obtaining consent, a flexible laryngoscope is used to examine both nasal cavities, the nasopharynx, pharnx, and larynx.      Nasal cavity: septal peforation with thick white disharge (culture obtained)  NP: wnl  Pharnx: wnl  Larynx: wnl    IMPRESSION:    Encounter Diagnoses   Name Primary?     Chronic pansinusitis      Dysphagia, unspecified type Yes     Globus sensation      Nasal crusting      Nasal septum perforation           RECOMMENDATIONS:      .  Orders Placed This Encounter   Procedures     LARYNGOSCOPY FLEX FIBEROPTIC, DIAGNOSTIC     CT Sinus w/o Contrast     XR Esophagram     Adult General Surg Referral     Orders Placed This Encounter   Medications     mupirocin (BACTROBAN) 2 % external ointment     Sig: Apply a pea sized amount to inside of each nostril 3x daily with Qtip for 14 days     Dispense:  22 g     Refill:  0     pantoprazole (PROTONIX) 40 MG EC tablet     Sig: Take 1 tablet (40 mg) by mouth daily for 90 days     Dispense:  90 tablet      Refill:  0

## 2023-04-09 LAB — BACTERIA SPEC CULT: ABNORMAL

## 2023-04-17 ENCOUNTER — TELEPHONE (OUTPATIENT)
Dept: OTOLARYNGOLOGY | Facility: CLINIC | Age: 78
End: 2023-04-17
Payer: COMMERCIAL

## 2023-04-17 NOTE — TELEPHONE ENCOUNTER
Spoke with Iris about a follow up to appointment. Pt states that when she get the nasal scope up her nose she ends up having symptoms of runny nose, productive cough, hard time breathing.  She has not been around anyone that has been sick and her COVID test was negative.  Her symptoms started last Wednesday and have stayed steady.  PT is wondering if when the scope is used if that aggravates her nose.  Let pt know that I would sent a message over to Dr. Adkins.  Pt expressed understanding.    [facility]      Roxanne Leonard RN  [facility]  ENT  [address] [place] [id number]  [email or web address]   Office:[phone]  Employed by [facility]

## 2023-04-17 NOTE — TELEPHONE ENCOUNTER
"[facility] Call Center    Phone Message    May a detailed message be left on voicemail: yes     Reason for Call: Symptoms or Concerns     If patient has red-flag symptoms, warm transfer to triage line    Current symptom or concern: Pt returning Roxanne's call. Since in to see Dr. Adkins pt has been experiencing severe cold symptoms. Cough, nasal congestion, fever, etc..patient reports that every time the \"scope thing\" is used up her nose she gets like this.     Symptoms have been present for:  5 day(s)        Action Taken: Message routed to:  [facility] ENT    Travel Screening: Not Applicable                                                                      "

## 2023-04-17 NOTE — TELEPHONE ENCOUNTER
Called pt and left a VM to call back.  Just checking in on how she is doing.    [facility]      Roxanne Leonard RN  [facility]  ENT  [address]  [place] [address]  [email or web address]   Office:[phone]  Employed by [facility]

## 2023-04-17 NOTE — TELEPHONE ENCOUNTER
Spoke with Iris and per Dr. Adkins he does not think it was the scope and that she should contact her PCP.  PT is having a hard time breathing, fever, cough and not feeling well.  Pt expressed understanding and will call her PCP.    [facility]      Roxanne Leonard RN  [facility]  ENT  [address]  [place] [address]  [email or web address]   Office:[phone]  Employed by [facility]

## 2023-08-30 NOTE — PROGRESS NOTES
"Citlalli Noonan is a 74 y.o. female who is being evaluated via a billable telephone visit.       The patient has been notified of following:      \"This telephone visit will be conducted via a call between you and your physician/provider. We have found that certain health care needs can be provided without the need for a physical exam.  This service lets us provide the care you need with a short phone conversation.  If a prescription is necessary we can send it directly to your pharmacy.  If lab work is needed we can place an order for that and you can then stop by our lab to have the test done at a later time. Telephone visits are billed at different rates depending on your insurance coverage. During this emergency period, for some insurers they may be billed the same as an in-person visit.  Please reach out to your insurance provider with any questions.\"     Patient has given verbal consent to a Telephone visit? Yes     What phone number would you like to be contacted at? [phone]     Patient would like to receive their AVS by AVS Preference: Mail a copy.    Pain score: 7  Constant   What does your pain feel like: ache, sharp at bedtime  Does the pain interfere with:  Work: no  Walking: yes  Sleep: yes  Daily activities: yes  Relationships: yes  F= 7  " Statement Selected

## 2023-11-13 ENCOUNTER — TRANSFERRED RECORDS (OUTPATIENT)
Dept: HEALTH INFORMATION MANAGEMENT | Facility: CLINIC | Age: 78
End: 2023-11-13

## 2023-12-15 ENCOUNTER — TRANSFERRED RECORDS (OUTPATIENT)
Dept: HEALTH INFORMATION MANAGEMENT | Facility: CLINIC | Age: 78
End: 2023-12-15

## 2024-08-11 ENCOUNTER — TRANSFERRED RECORDS (OUTPATIENT)
Dept: MULTI SPECIALTY CLINIC | Facility: CLINIC | Age: 79
End: 2024-08-11

## 2024-08-11 LAB — RETINOPATHY: NORMAL

## 2024-09-27 ENCOUNTER — HOSPITAL ENCOUNTER (OUTPATIENT)
Dept: ULTRASOUND IMAGING | Facility: HOSPITAL | Age: 79
Discharge: HOME | End: 2024-09-27
Attending: INTERNAL MEDICINE | Admitting: INTERNAL MEDICINE
Payer: COMMERCIAL

## 2024-09-27 DIAGNOSIS — N18.2 CKD (CHRONIC KIDNEY DISEASE) STAGE 2, GFR 60-89 ML/MIN: ICD-10-CM

## 2024-09-27 PROCEDURE — 76770 US EXAM ABDO BACK WALL COMP: CPT

## 2024-09-30 ENCOUNTER — TRANSFERRED RECORDS (OUTPATIENT)
Dept: HEALTH INFORMATION MANAGEMENT | Facility: CLINIC | Age: 79
End: 2024-09-30

## 2025-01-28 ENCOUNTER — TRANSFERRED RECORDS (OUTPATIENT)
Dept: HEALTH INFORMATION MANAGEMENT | Facility: CLINIC | Age: 80
End: 2025-01-28

## 2025-02-05 ENCOUNTER — HOSPITAL ENCOUNTER (OUTPATIENT)
Dept: ULTRASOUND IMAGING | Facility: HOSPITAL | Age: 80
Discharge: HOME | End: 2025-02-05
Attending: INTERNAL MEDICINE
Payer: COMMERCIAL

## 2025-02-05 DIAGNOSIS — N18.2 CKD (CHRONIC KIDNEY DISEASE) STAGE 2, GFR 60-89 ML/MIN: ICD-10-CM

## 2025-02-05 PROCEDURE — 76770 US EXAM ABDO BACK WALL COMP: CPT

## 2025-03-14 NOTE — PROGRESS NOTES
[facility] Discharge Summary    Patient Name: Citlalli Noonan  Date: 2/19/2020  Referring provider: Stacia Thrasher CNP  Visit Diagnosis:     ICD-10-CM    1. Chronic neck pain M54.2     G89.29    2. Decreased range of motion of neck R29.898    3. Generalized muscle weakness M62.81        Goal Status:  See status in note below.    Patient was seen for 4 visits from 10/2/19 to 11/13/19 with 1 cancelled appointment.    The patient attended therapy initially and was progressing well, but did not finish the therapy sessions prescribed as pt did not return for f/u stating she could not afford the copay.    Therapy will be discontinued at this time.  The patient will need a new referral to resume.    Thank you for your referral.  Randa Oliver PT, DPT, OCS, CLT  2/19/2020  8:26 AM      [facility] Daily Progress     Patient Name: Citlalli Noonan  Date: 11/13/2019  Visit #: 4/12  Referring Provider: Shannan Gagnon PA*  Referring Diagnosis: Cervical stenosis of spinal canal  Visit Diagnosis:     ICD-10-CM    1. Chronic neck pain M54.2     G89.29    2. Decreased range of motion of neck R29.898    3. Generalized muscle weakness M62.81        Assessment:     Pt demo's continued improved cervical ROM and decreased cervical muscle tone with improved levels of pain in neck this past week.    Patient is benefitting from skilled physical therapy and is making steady progress toward functional goals.  Patient is appropriate to continue with skilled physical therapy intervention, as indicated by initial plan of care.    Goal Status:   Pt. will demonstrate/verbalize independence in self-management of condition in : 12 weeks - IMPROVING    Pt. will have improved quality of sleep: with less pain;waking less times/night;in 12 weeks - IMPROVING from neck    Patient [place]: for driving;for watching traffic;for conversation;with less pain;with less difficulty;in 12 weeks - IMPROVING    Patient will look  "up / down: for drinking;for reading;with less pain;with less difficulty;in 12 weeks - IMPROVING with drinking    Pt will: be able to perform house hold activities like vacuuming with minimal pain and difficulty in 12 weeks for improved QOL.      Plan / Patient Education:     Continue with initial plan of care.   Assess response to STM cervical paraspinals and HEP.  Reassess ROM and cont with ROM and strengthening as able. Add arm slides and SB resistance if able.    Subjective:     Pain Ratin/10    Pt reports she was sick with something last week - maybe food poisoning that lasted a couple of days - multiple days of vomiting different that her usual morning nausea.  Pt reports going to the pain center and letting them know that her neck has been a lot better.    Pt reports she has had her L ear has been much better - had it cleared out by MD. Has had less dizziness since ear got cleaned out.    Patient Outcome Measures:  Neck Disability Score in %: 50     Scores range from 0-100%, where a score of 0% represents minimal pain and maximal function. The minmal clinically important difference is a score reduction of 10%. FROM INITIAL    Objective:     R rotation 61 degree (was 43) and L rotation 59 degrees (was 40 degrees) beginning of session    Min hypo C5-7 side glides B    Mild tone and tenderness B cervical paraspinals and distal R upper trap - tone felt improved and equal end of session    Treatment Today      TREATMENT MINUTES COMMENTS   Evaluation     Self-care/ Home management     Manual therapy 12 Reassessed cervical palpation and discussed results. Performed STM B cervical paraspinals and R distal upper trap and supine cervical jt mobs side glides grade II x30\" oscillations C5-C7.    Neuromuscular Re-education     Therapeutic Activity     Therapeutic Exercises 14 Discussed activity level and HEP since last visit. Reassessed cervical ROM and discussed great progress. Performed PROM and AROM supine B " "rotation supine x5\" x3 reps each side. Reviewed, performed and added to  HEP per pt instructions and printed for home.   Gait training     Modality__________________                Total 26    Blank areas are intentional and mean the treatment did not include these items.       Randa Oliver PT, DPT, OCS, CLT  11/13/2019  8:49 AM    " ED Attending Dr. Segundo,

## 2025-04-18 ENCOUNTER — TRANSFERRED RECORDS (OUTPATIENT)
Dept: HEALTH INFORMATION MANAGEMENT | Facility: CLINIC | Age: 80
End: 2025-04-18

## 2025-05-23 PROBLEM — M51.379 DEGENERATION OF LUMBAR OR LUMBOSACRAL INTERVERTEBRAL DISC: Status: ACTIVE | Noted: 2025-05-23

## 2025-05-23 PROBLEM — G89.4 CHRONIC PAIN DISORDER: Status: ACTIVE | Noted: 2019-01-31

## 2025-05-23 PROBLEM — K25.9 GASTRIC ULCER: Status: ACTIVE | Noted: 2025-05-23

## 2025-05-23 PROBLEM — F11.20 OPIOID TYPE DEPENDENCE, CONTINUOUS (H): Status: ACTIVE | Noted: 2025-05-23

## 2025-05-23 PROBLEM — J45.20 MILD INTERMITTENT ASTHMA WITHOUT COMPLICATION: Status: ACTIVE | Noted: 2025-05-23

## 2025-05-23 PROBLEM — G89.4 CHRONIC PAIN SYNDROME: Status: ACTIVE | Noted: 2025-05-23

## 2025-05-23 PROBLEM — K75.81 NASH (NONALCOHOLIC STEATOHEPATITIS): Status: ACTIVE | Noted: 2025-05-23

## 2025-05-23 PROBLEM — M51.26 DISPLACEMENT OF LUMBAR INTERVERTEBRAL DISC WITHOUT MYELOPATHY: Status: ACTIVE | Noted: 2025-05-23

## 2025-05-23 PROBLEM — N18.2 STAGE 2 CHRONIC KIDNEY DISEASE: Status: ACTIVE | Noted: 2025-05-23

## 2025-05-23 PROBLEM — M54.16 LUMBAR RADICULAR PAIN: Status: ACTIVE | Noted: 2025-05-23

## 2025-05-23 PROBLEM — E11.42 DIABETIC PERIPHERAL NEUROPATHY (H): Status: ACTIVE | Noted: 2025-05-23

## 2025-05-23 PROBLEM — D49.7 PITUITARY TUMOR: Status: RESOLVED | Noted: 2017-04-14 | Resolved: 2025-05-23

## 2025-05-23 PROBLEM — M25.569 PAIN IN JOINT, LOWER LEG: Status: ACTIVE | Noted: 2025-05-23

## 2025-05-26 ENCOUNTER — PATIENT OUTREACH (OUTPATIENT)
Dept: CARE COORDINATION | Facility: CLINIC | Age: 80
End: 2025-05-26
Payer: COMMERCIAL

## 2025-05-28 ENCOUNTER — RESULTS FOLLOW-UP (OUTPATIENT)
Dept: GASTROENTEROLOGY | Facility: CLINIC | Age: 80
End: 2025-05-28

## 2025-06-20 ENCOUNTER — HOSPITAL ENCOUNTER (OUTPATIENT)
Dept: BONE DENSITY | Facility: HOSPITAL | Age: 80
Discharge: HOME | End: 2025-06-20
Attending: INTERNAL MEDICINE
Payer: COMMERCIAL

## 2025-06-20 ENCOUNTER — ANCILLARY PROCEDURE (OUTPATIENT)
Dept: MAMMOGRAPHY | Facility: HOSPITAL | Age: 80
End: 2025-06-20
Attending: INTERNAL MEDICINE
Payer: COMMERCIAL

## 2025-06-20 DIAGNOSIS — Z12.31 ENCOUNTER FOR SCREENING MAMMOGRAM FOR MALIGNANT NEOPLASM OF BREAST: ICD-10-CM

## 2025-06-20 DIAGNOSIS — M85.89 OSTEOPENIA OF MULTIPLE SITES: ICD-10-CM

## 2025-06-20 PROCEDURE — 77080 DXA BONE DENSITY AXIAL: CPT

## 2025-06-20 PROCEDURE — 77063 BREAST TOMOSYNTHESIS BI: CPT

## 2025-06-24 ENCOUNTER — RESULTS FOLLOW-UP (OUTPATIENT)
Dept: INTERNAL MEDICINE | Facility: CLINIC | Age: 80
End: 2025-06-24

## 2025-07-09 ENCOUNTER — OFFICE VISIT (OUTPATIENT)
Dept: ANESTHESIOLOGY | Facility: CLINIC | Age: 80
End: 2025-07-09
Attending: INTERNAL MEDICINE
Payer: COMMERCIAL

## 2025-07-09 VITALS
RESPIRATION RATE: 16 BRPM | HEART RATE: 72 BPM | OXYGEN SATURATION: 96 % | SYSTOLIC BLOOD PRESSURE: 164 MMHG | DIASTOLIC BLOOD PRESSURE: 68 MMHG

## 2025-07-09 DIAGNOSIS — F11.20 OPIOID TYPE DEPENDENCE, CONTINUOUS (H): ICD-10-CM

## 2025-07-09 DIAGNOSIS — M54.16 LUMBAR RADICULAR PAIN: ICD-10-CM

## 2025-07-09 DIAGNOSIS — M54.12 BRACHIAL NEURITIS OR RADICULITIS: ICD-10-CM

## 2025-07-09 DIAGNOSIS — E11.42 DIABETIC POLYNEUROPATHY ASSOCIATED WITH TYPE 2 DIABETES MELLITUS (H): ICD-10-CM

## 2025-07-09 DIAGNOSIS — G89.4 CHRONIC PAIN SYNDROME: ICD-10-CM

## 2025-07-09 PROCEDURE — 1125F AMNT PAIN NOTED PAIN PRSNT: CPT | Performed by: ANESTHESIOLOGY

## 2025-07-09 PROCEDURE — 3077F SYST BP >= 140 MM HG: CPT | Performed by: ANESTHESIOLOGY

## 2025-07-09 PROCEDURE — 99204 OFFICE O/P NEW MOD 45 MIN: CPT | Performed by: ANESTHESIOLOGY

## 2025-07-09 PROCEDURE — 3078F DIAST BP <80 MM HG: CPT | Performed by: ANESTHESIOLOGY

## 2025-07-09 ASSESSMENT — PAIN SCALES - GENERAL: PAINLEVEL_OUTOF10: SEVERE PAIN (8)
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
## 2025-07-09 NOTE — NURSING NOTE
"Patient presents with:  Pain  Pain Management  Consult: Went to [facility] then to [facility] for pain referred from KEYLA Saenz to transfer care      Severe Pain (8)     Pain Medications       Analgesics Other Refills Start End     acetaminophen (TYLENOL) 500 MG tablet --  --    Sig - Route: Take 1,000 mg by mouth - Oral    Class: Historical       Salicylates Refills Start End     aspirin (ASA) 81 MG EC tablet --  --    Sig - Route: Take 1 tablet by mouth - Oral    Class: Historical            What medications are you using for pain? Tylenol, IB, Percocet (this is no longer on her profile-she states it is left over from 12/2024)    Have you been seen by another pain clinic/ provider? Yes, would like to transfer care      Expectations: Would like methadone to be ordered-\"it works\"    Yoly Medina, LPLAURA      "

## 2025-08-11 ENCOUNTER — PATIENT OUTREACH (OUTPATIENT)
Dept: CARE COORDINATION | Facility: CLINIC | Age: 80
End: 2025-08-11
Payer: COMMERCIAL